# Patient Record
Sex: FEMALE | Race: OTHER | HISPANIC OR LATINO | URBAN - METROPOLITAN AREA
[De-identification: names, ages, dates, MRNs, and addresses within clinical notes are randomized per-mention and may not be internally consistent; named-entity substitution may affect disease eponyms.]

---

## 2017-08-23 ENCOUNTER — EMERGENCY (EMERGENCY)
Facility: HOSPITAL | Age: 67
LOS: 1 days | Discharge: HOME | End: 2017-08-23
Attending: EMERGENCY MEDICINE
Payer: COMMERCIAL

## 2017-08-23 VITALS
SYSTOLIC BLOOD PRESSURE: 137 MMHG | TEMPERATURE: 98 F | OXYGEN SATURATION: 93 % | HEART RATE: 99 BPM | DIASTOLIC BLOOD PRESSURE: 84 MMHG | RESPIRATION RATE: 20 BRPM

## 2017-08-23 VITALS
RESPIRATION RATE: 18 BRPM | HEART RATE: 110 BPM | WEIGHT: 139.99 LBS | TEMPERATURE: 98 F | DIASTOLIC BLOOD PRESSURE: 77 MMHG | SYSTOLIC BLOOD PRESSURE: 178 MMHG | OXYGEN SATURATION: 95 % | HEIGHT: 55 IN

## 2017-08-23 LAB
ALBUMIN SERPL ELPH-MCNC: 4.1 G/DL — SIGNIFICANT CHANGE UP (ref 3.3–5.2)
ALP SERPL-CCNC: 96 U/L — SIGNIFICANT CHANGE UP (ref 40–120)
ALT FLD-CCNC: 27 U/L — SIGNIFICANT CHANGE UP
ANION GAP SERPL CALC-SCNC: 16 MMOL/L — SIGNIFICANT CHANGE UP (ref 5–17)
ANISOCYTOSIS BLD QL: SLIGHT — SIGNIFICANT CHANGE UP
AST SERPL-CCNC: 37 U/L — HIGH
BASOPHILS # BLD AUTO: 0 K/UL — SIGNIFICANT CHANGE UP (ref 0–0.2)
BASOPHILS NFR BLD AUTO: 0.2 % — SIGNIFICANT CHANGE UP (ref 0–2)
BILIRUB SERPL-MCNC: 0.2 MG/DL — LOW (ref 0.4–2)
BUN SERPL-MCNC: 28 MG/DL — HIGH (ref 8–20)
CALCIUM SERPL-MCNC: 10.1 MG/DL — SIGNIFICANT CHANGE UP (ref 8.6–10.2)
CHLORIDE SERPL-SCNC: 103 MMOL/L — SIGNIFICANT CHANGE UP (ref 98–107)
CO2 SERPL-SCNC: 23 MMOL/L — SIGNIFICANT CHANGE UP (ref 22–29)
CREAT SERPL-MCNC: 0.87 MG/DL — SIGNIFICANT CHANGE UP (ref 0.5–1.3)
ELLIPTOCYTES BLD QL SMEAR: SLIGHT — SIGNIFICANT CHANGE UP
EOSINOPHIL # BLD AUTO: 0.1 K/UL — SIGNIFICANT CHANGE UP (ref 0–0.5)
EOSINOPHIL NFR BLD AUTO: 1.2 % — SIGNIFICANT CHANGE UP (ref 0–6)
GLUCOSE SERPL-MCNC: 83 MG/DL — SIGNIFICANT CHANGE UP (ref 70–115)
HCT VFR BLD CALC: 36.6 % — LOW (ref 37–47)
HGB BLD-MCNC: 12.1 G/DL — SIGNIFICANT CHANGE UP (ref 12–16)
LIDOCAIN IGE QN: 61 U/L — HIGH (ref 22–51)
LYMPHOCYTES # BLD AUTO: 1.8 K/UL — SIGNIFICANT CHANGE UP (ref 1–4.8)
LYMPHOCYTES # BLD AUTO: 22.3 % — SIGNIFICANT CHANGE UP (ref 20–55)
MACROCYTES BLD QL: SLIGHT — SIGNIFICANT CHANGE UP
MCHC RBC-ENTMCNC: 26 PG — LOW (ref 27–31)
MCHC RBC-ENTMCNC: 33.1 G/DL — SIGNIFICANT CHANGE UP (ref 32–36)
MCV RBC AUTO: 78.5 FL — LOW (ref 81–99)
MICROCYTES BLD QL: SLIGHT — SIGNIFICANT CHANGE UP
MONOCYTES # BLD AUTO: 0.6 K/UL — SIGNIFICANT CHANGE UP (ref 0–0.8)
MONOCYTES NFR BLD AUTO: 6.6 % — SIGNIFICANT CHANGE UP (ref 3–10)
NEUTROPHILS # BLD AUTO: 5.7 K/UL — SIGNIFICANT CHANGE UP (ref 1.8–8)
NEUTROPHILS NFR BLD AUTO: 69.5 % — SIGNIFICANT CHANGE UP (ref 37–73)
OVALOCYTES BLD QL SMEAR: SLIGHT — SIGNIFICANT CHANGE UP
PLAT MORPH BLD: NORMAL — SIGNIFICANT CHANGE UP
PLATELET # BLD AUTO: 196 K/UL — SIGNIFICANT CHANGE UP (ref 150–400)
POIKILOCYTOSIS BLD QL AUTO: SLIGHT — SIGNIFICANT CHANGE UP
POTASSIUM SERPL-MCNC: 5.5 MMOL/L — HIGH (ref 3.5–5.3)
POTASSIUM SERPL-SCNC: 5.5 MMOL/L — HIGH (ref 3.5–5.3)
PROT SERPL-MCNC: 7.6 G/DL — SIGNIFICANT CHANGE UP (ref 6.6–8.7)
RBC # BLD: 4.66 M/UL — SIGNIFICANT CHANGE UP (ref 4.4–5.2)
RBC # FLD: 20.5 % — HIGH (ref 11–15.6)
RBC BLD AUTO: ABNORMAL
SODIUM SERPL-SCNC: 142 MMOL/L — SIGNIFICANT CHANGE UP (ref 135–145)
WBC # BLD: 8.3 K/UL — SIGNIFICANT CHANGE UP (ref 4.8–10.8)
WBC # FLD AUTO: 8.3 K/UL — SIGNIFICANT CHANGE UP (ref 4.8–10.8)

## 2017-08-23 PROCEDURE — 99285 EMERGENCY DEPT VISIT HI MDM: CPT

## 2017-08-23 PROCEDURE — 74177 CT ABD & PELVIS W/CONTRAST: CPT | Mod: 26

## 2017-08-23 PROCEDURE — 93010 ELECTROCARDIOGRAM REPORT: CPT

## 2017-08-23 RX ORDER — SODIUM CHLORIDE 9 MG/ML
1000 INJECTION INTRAMUSCULAR; INTRAVENOUS; SUBCUTANEOUS ONCE
Qty: 0 | Refills: 0 | Status: COMPLETED | OUTPATIENT
Start: 2017-08-23 | End: 2017-08-23

## 2017-08-23 RX ORDER — ONDANSETRON 8 MG/1
4 TABLET, FILM COATED ORAL ONCE
Qty: 0 | Refills: 0 | Status: COMPLETED | OUTPATIENT
Start: 2017-08-23 | End: 2017-08-23

## 2017-08-23 RX ORDER — FAMOTIDINE 10 MG/ML
20 INJECTION INTRAVENOUS ONCE
Qty: 0 | Refills: 0 | Status: COMPLETED | OUTPATIENT
Start: 2017-08-23 | End: 2017-08-23

## 2017-08-23 RX ORDER — MORPHINE SULFATE 50 MG/1
4 CAPSULE, EXTENDED RELEASE ORAL ONCE
Qty: 0 | Refills: 0 | Status: DISCONTINUED | OUTPATIENT
Start: 2017-08-23 | End: 2017-08-23

## 2017-08-23 RX ORDER — PANTOPRAZOLE SODIUM 20 MG/1
40 TABLET, DELAYED RELEASE ORAL ONCE
Qty: 0 | Refills: 0 | Status: COMPLETED | OUTPATIENT
Start: 2017-08-23 | End: 2017-08-23

## 2017-08-23 RX ORDER — SODIUM CHLORIDE 9 MG/ML
3 INJECTION INTRAMUSCULAR; INTRAVENOUS; SUBCUTANEOUS ONCE
Qty: 0 | Refills: 0 | Status: COMPLETED | OUTPATIENT
Start: 2017-08-23 | End: 2017-08-23

## 2017-08-23 RX ORDER — METRONIDAZOLE 500 MG
500 TABLET ORAL ONCE
Qty: 0 | Refills: 0 | Status: COMPLETED | OUTPATIENT
Start: 2017-08-23 | End: 2017-08-23

## 2017-08-23 RX ORDER — SODIUM CHLORIDE 9 MG/ML
1000 INJECTION INTRAMUSCULAR; INTRAVENOUS; SUBCUTANEOUS
Qty: 0 | Refills: 0 | Status: DISCONTINUED | OUTPATIENT
Start: 2017-08-23 | End: 2017-08-27

## 2017-08-23 RX ADMIN — ONDANSETRON 4 MILLIGRAM(S): 8 TABLET, FILM COATED ORAL at 19:02

## 2017-08-23 RX ADMIN — SODIUM CHLORIDE 1000 MILLILITER(S): 9 INJECTION INTRAMUSCULAR; INTRAVENOUS; SUBCUTANEOUS at 21:31

## 2017-08-23 RX ADMIN — PANTOPRAZOLE SODIUM 40 MILLIGRAM(S): 20 TABLET, DELAYED RELEASE ORAL at 19:02

## 2017-08-23 RX ADMIN — MORPHINE SULFATE 4 MILLIGRAM(S): 50 CAPSULE, EXTENDED RELEASE ORAL at 17:12

## 2017-08-23 RX ADMIN — MORPHINE SULFATE 4 MILLIGRAM(S): 50 CAPSULE, EXTENDED RELEASE ORAL at 18:19

## 2017-08-23 RX ADMIN — FAMOTIDINE 20 MILLIGRAM(S): 10 INJECTION INTRAVENOUS at 17:15

## 2017-08-23 RX ADMIN — SODIUM CHLORIDE 3 MILLILITER(S): 9 INJECTION INTRAMUSCULAR; INTRAVENOUS; SUBCUTANEOUS at 17:00

## 2017-08-23 RX ADMIN — SODIUM CHLORIDE 125 MILLILITER(S): 9 INJECTION INTRAMUSCULAR; INTRAVENOUS; SUBCUTANEOUS at 17:00

## 2017-08-23 RX ADMIN — ONDANSETRON 4 MILLIGRAM(S): 8 TABLET, FILM COATED ORAL at 17:10

## 2017-08-23 NOTE — ED ADULT NURSE REASSESSMENT NOTE - NS ED NURSE REASSESS COMMENT FT1
Assumed care of Pt. Pt is Aox3 in NAD. Pt denies complaint.  IV clean dry and intact, running without difficulty. Pt OOB independently. Safety maintained, Bed locked in lowest position. Call bell in reach.

## 2017-08-23 NOTE — ED ADULT NURSE NOTE - OBJECTIVE STATEMENT
pt presents to ED with epigastric pain with n/v and intermittent diarrhea x five days. afebrile. denies urinary complaints. breathing si even and unlabored a&ox3 will continue to monitor and reassess.

## 2017-08-23 NOTE — ED ADULT NURSE NOTE - NS ED NURSE DC INFO COMPLEXITY
Returned Demonstration/Verbalized Understanding/Straightforward: Basic instructions, no meds, no home treatment/Patient asked questions

## 2017-08-23 NOTE — ED ADULT NURSE NOTE - PMH
Anemia    Diabetes mellitus    Dyslipidemia    GERD (gastroesophageal reflux disease)    Hypertension    Myocardial infarction    Sleep apnea

## 2017-08-23 NOTE — ED ADULT NURSE NOTE - PSH
H/O right knee surgery  8/20  S/P cataract extraction  B/L  S/P cholecystectomy    S/P partial thyroidectomy    S/P tubal ligation  s/p cyst removal of left axilla

## 2017-08-24 LAB
APPEARANCE UR: CLEAR — SIGNIFICANT CHANGE UP
BILIRUB UR-MCNC: NEGATIVE — SIGNIFICANT CHANGE UP
COLOR SPEC: YELLOW — SIGNIFICANT CHANGE UP
DIFF PNL FLD: NEGATIVE — SIGNIFICANT CHANGE UP
EPI CELLS # UR: SIGNIFICANT CHANGE UP
GLUCOSE UR QL: NEGATIVE MG/DL — SIGNIFICANT CHANGE UP
KETONES UR-MCNC: ABNORMAL
LEUKOCYTE ESTERASE UR-ACNC: ABNORMAL
NITRITE UR-MCNC: NEGATIVE — SIGNIFICANT CHANGE UP
PH UR: 5 — SIGNIFICANT CHANGE UP (ref 5–8)
PROT UR-MCNC: 15 MG/DL
RBC CASTS # UR COMP ASSIST: NEGATIVE /HPF — SIGNIFICANT CHANGE UP (ref 0–4)
SP GR SPEC: 1.02 — SIGNIFICANT CHANGE UP (ref 1.01–1.02)
UROBILINOGEN FLD QL: NEGATIVE MG/DL — SIGNIFICANT CHANGE UP
WBC UR QL: SIGNIFICANT CHANGE UP

## 2017-08-24 PROCEDURE — 96376 TX/PRO/DX INJ SAME DRUG ADON: CPT

## 2017-08-24 PROCEDURE — 87186 SC STD MICRODIL/AGAR DIL: CPT

## 2017-08-24 PROCEDURE — 96374 THER/PROPH/DIAG INJ IV PUSH: CPT | Mod: XU

## 2017-08-24 PROCEDURE — 93005 ELECTROCARDIOGRAM TRACING: CPT

## 2017-08-24 PROCEDURE — 85027 COMPLETE CBC AUTOMATED: CPT

## 2017-08-24 PROCEDURE — 87086 URINE CULTURE/COLONY COUNT: CPT

## 2017-08-24 PROCEDURE — 81001 URINALYSIS AUTO W/SCOPE: CPT

## 2017-08-24 PROCEDURE — 36415 COLL VENOUS BLD VENIPUNCTURE: CPT

## 2017-08-24 PROCEDURE — 74177 CT ABD & PELVIS W/CONTRAST: CPT

## 2017-08-24 PROCEDURE — 96375 TX/PRO/DX INJ NEW DRUG ADDON: CPT

## 2017-08-24 PROCEDURE — 83690 ASSAY OF LIPASE: CPT

## 2017-08-24 PROCEDURE — 80053 COMPREHEN METABOLIC PANEL: CPT

## 2017-08-24 PROCEDURE — 99284 EMERGENCY DEPT VISIT MOD MDM: CPT | Mod: 25

## 2017-08-24 PROCEDURE — T1013: CPT

## 2017-08-24 RX ORDER — METRONIDAZOLE 500 MG
1 TABLET ORAL
Qty: 30 | Refills: 0 | OUTPATIENT
Start: 2017-08-24 | End: 2017-09-03

## 2017-08-24 RX ORDER — CIPROFLOXACIN LACTATE 400MG/40ML
1 VIAL (ML) INTRAVENOUS
Qty: 10 | Refills: 0 | OUTPATIENT
Start: 2017-08-24 | End: 2017-09-03

## 2017-08-24 RX ADMIN — SODIUM CHLORIDE 1000 MILLILITER(S): 9 INJECTION INTRAMUSCULAR; INTRAVENOUS; SUBCUTANEOUS at 00:21

## 2017-08-24 RX ADMIN — Medication 100 MILLIGRAM(S): at 00:21

## 2017-08-28 ENCOUNTER — EMERGENCY (EMERGENCY)
Facility: HOSPITAL | Age: 67
LOS: 1 days | Discharge: HOME | End: 2017-08-28
Attending: EMERGENCY MEDICINE
Payer: COMMERCIAL

## 2017-08-28 VITALS
DIASTOLIC BLOOD PRESSURE: 71 MMHG | HEART RATE: 101 BPM | HEIGHT: 55 IN | SYSTOLIC BLOOD PRESSURE: 107 MMHG | OXYGEN SATURATION: 95 % | RESPIRATION RATE: 20 BRPM | TEMPERATURE: 98 F | WEIGHT: 134.92 LBS

## 2017-08-28 LAB
ALBUMIN SERPL ELPH-MCNC: 4.1 G/DL — SIGNIFICANT CHANGE UP (ref 3.3–5.2)
ALP SERPL-CCNC: 90 U/L — SIGNIFICANT CHANGE UP (ref 40–120)
ALT FLD-CCNC: 38 U/L — HIGH
ANION GAP SERPL CALC-SCNC: 16 MMOL/L — SIGNIFICANT CHANGE UP (ref 5–17)
APTT BLD: 31.6 SEC — SIGNIFICANT CHANGE UP (ref 27.5–37.4)
AST SERPL-CCNC: 89 U/L — HIGH
BASOPHILS # BLD AUTO: 0 K/UL — SIGNIFICANT CHANGE UP (ref 0–0.2)
BASOPHILS NFR BLD AUTO: 0.2 % — SIGNIFICANT CHANGE UP (ref 0–2)
BILIRUB SERPL-MCNC: 0.2 MG/DL — LOW (ref 0.4–2)
BUN SERPL-MCNC: 17 MG/DL — SIGNIFICANT CHANGE UP (ref 8–20)
CALCIUM SERPL-MCNC: 9.5 MG/DL — SIGNIFICANT CHANGE UP (ref 8.6–10.2)
CHLORIDE SERPL-SCNC: 102 MMOL/L — SIGNIFICANT CHANGE UP (ref 98–107)
CK SERPL-CCNC: 77 U/L — SIGNIFICANT CHANGE UP (ref 25–170)
CO2 SERPL-SCNC: 21 MMOL/L — LOW (ref 22–29)
CREAT SERPL-MCNC: 0.8 MG/DL — SIGNIFICANT CHANGE UP (ref 0.5–1.3)
EOSINOPHIL # BLD AUTO: 0.4 K/UL — SIGNIFICANT CHANGE UP (ref 0–0.5)
EOSINOPHIL NFR BLD AUTO: 7.2 % — HIGH (ref 0–6)
GLUCOSE SERPL-MCNC: 61 MG/DL — LOW (ref 70–115)
HCT VFR BLD CALC: 37.1 % — SIGNIFICANT CHANGE UP (ref 37–47)
HGB BLD-MCNC: 12.4 G/DL — SIGNIFICANT CHANGE UP (ref 12–16)
INR BLD: 1.09 RATIO — SIGNIFICANT CHANGE UP (ref 0.88–1.16)
LYMPHOCYTES # BLD AUTO: 1.4 K/UL — SIGNIFICANT CHANGE UP (ref 1–4.8)
LYMPHOCYTES # BLD AUTO: 25.9 % — SIGNIFICANT CHANGE UP (ref 20–55)
MCHC RBC-ENTMCNC: 25.9 PG — LOW (ref 27–31)
MCHC RBC-ENTMCNC: 33.4 G/DL — SIGNIFICANT CHANGE UP (ref 32–36)
MCV RBC AUTO: 77.6 FL — LOW (ref 81–99)
MONOCYTES # BLD AUTO: 0.4 K/UL — SIGNIFICANT CHANGE UP (ref 0–0.8)
MONOCYTES NFR BLD AUTO: 6.3 % — SIGNIFICANT CHANGE UP (ref 3–10)
NEUTROPHILS # BLD AUTO: 3.3 K/UL — SIGNIFICANT CHANGE UP (ref 1.8–8)
NEUTROPHILS NFR BLD AUTO: 60.2 % — SIGNIFICANT CHANGE UP (ref 37–73)
NT-PROBNP SERPL-SCNC: 30 PG/ML — SIGNIFICANT CHANGE UP (ref 0–300)
PLATELET # BLD AUTO: 167 K/UL — SIGNIFICANT CHANGE UP (ref 150–400)
POTASSIUM SERPL-MCNC: 5.1 MMOL/L — SIGNIFICANT CHANGE UP (ref 3.5–5.3)
POTASSIUM SERPL-SCNC: 5.1 MMOL/L — SIGNIFICANT CHANGE UP (ref 3.5–5.3)
PROT SERPL-MCNC: 7.7 G/DL — SIGNIFICANT CHANGE UP (ref 6.6–8.7)
PROTHROM AB SERPL-ACNC: 12 SEC — SIGNIFICANT CHANGE UP (ref 9.8–12.7)
RBC # BLD: 4.78 M/UL — SIGNIFICANT CHANGE UP (ref 4.4–5.2)
RBC # FLD: 19.7 % — HIGH (ref 11–15.6)
SODIUM SERPL-SCNC: 139 MMOL/L — SIGNIFICANT CHANGE UP (ref 135–145)
TROPONIN T SERPL-MCNC: <0.01 NG/ML — SIGNIFICANT CHANGE UP (ref 0–0.06)
WBC # BLD: 5.6 K/UL — SIGNIFICANT CHANGE UP (ref 4.8–10.8)
WBC # FLD AUTO: 5.6 K/UL — SIGNIFICANT CHANGE UP (ref 4.8–10.8)

## 2017-08-28 PROCEDURE — 85730 THROMBOPLASTIN TIME PARTIAL: CPT

## 2017-08-28 PROCEDURE — 80053 COMPREHEN METABOLIC PANEL: CPT

## 2017-08-28 PROCEDURE — 84484 ASSAY OF TROPONIN QUANT: CPT

## 2017-08-28 PROCEDURE — 71275 CT ANGIOGRAPHY CHEST: CPT

## 2017-08-28 PROCEDURE — 93005 ELECTROCARDIOGRAM TRACING: CPT

## 2017-08-28 PROCEDURE — 85610 PROTHROMBIN TIME: CPT

## 2017-08-28 PROCEDURE — 96374 THER/PROPH/DIAG INJ IV PUSH: CPT | Mod: XU

## 2017-08-28 PROCEDURE — 82550 ASSAY OF CK (CPK): CPT

## 2017-08-28 PROCEDURE — 83880 ASSAY OF NATRIURETIC PEPTIDE: CPT

## 2017-08-28 PROCEDURE — 71020: CPT | Mod: 26

## 2017-08-28 PROCEDURE — 36415 COLL VENOUS BLD VENIPUNCTURE: CPT

## 2017-08-28 PROCEDURE — 99284 EMERGENCY DEPT VISIT MOD MDM: CPT | Mod: 25

## 2017-08-28 PROCEDURE — T1013: CPT

## 2017-08-28 PROCEDURE — 71275 CT ANGIOGRAPHY CHEST: CPT | Mod: 26

## 2017-08-28 PROCEDURE — 99284 EMERGENCY DEPT VISIT MOD MDM: CPT

## 2017-08-28 PROCEDURE — 93010 ELECTROCARDIOGRAM REPORT: CPT

## 2017-08-28 PROCEDURE — 71046 X-RAY EXAM CHEST 2 VIEWS: CPT

## 2017-08-28 PROCEDURE — 94640 AIRWAY INHALATION TREATMENT: CPT

## 2017-08-28 PROCEDURE — 85027 COMPLETE CBC AUTOMATED: CPT

## 2017-08-28 RX ORDER — ALBUTEROL 90 UG/1
2.5 AEROSOL, METERED ORAL ONCE
Qty: 0 | Refills: 0 | Status: COMPLETED | OUTPATIENT
Start: 2017-08-28 | End: 2017-08-28

## 2017-08-28 RX ORDER — IPRATROPIUM/ALBUTEROL SULFATE 18-103MCG
3 AEROSOL WITH ADAPTER (GRAM) INHALATION ONCE
Qty: 0 | Refills: 0 | Status: COMPLETED | OUTPATIENT
Start: 2017-08-28 | End: 2017-08-28

## 2017-08-28 RX ORDER — ALBUTEROL 90 UG/1
2 AEROSOL, METERED ORAL
Qty: 360 | Refills: 0 | OUTPATIENT
Start: 2017-08-28 | End: 2017-09-27

## 2017-08-28 RX ADMIN — Medication 3 MILLILITER(S): at 18:42

## 2017-08-28 RX ADMIN — ALBUTEROL 2.5 MILLIGRAM(S): 90 AEROSOL, METERED ORAL at 17:28

## 2017-08-28 RX ADMIN — Medication 125 MILLIGRAM(S): at 18:42

## 2017-08-28 NOTE — ED ADULT TRIAGE NOTE - CHIEF COMPLAINT QUOTE
"I have a cough for 1 month, but it worsened over the last 5 days, I saw the dr today and he sent me here. " Pt sent in from [facility] office for medical evaluation for SOB, FRY and wheezing. Pt has audible wheezing with cough.

## 2017-08-28 NOTE — ED ADULT NURSE NOTE - OBJECTIVE STATEMENT
LAte entry : Pt presented to ED send by PMD for eval of SOB upon exertion  for one month , denies chest pain

## 2017-08-28 NOTE — ED STATDOCS - PROGRESS NOTE DETAILS
68 y/o F pt with a PMHx of anemia, DM, dyslipidemia, HTN, MI, GERD  presents to the ED c/o SOB, exertional dyspnea, orthopnea, blood in cough, chest pain, itch  that onset 5 days ago. She states that she went to her PMD today who sent her into the ED for the issue. Came to the ED 6 days ago. Denies pedal edema, recent travel or any other complaints. NKDA.  Focused eval, protocol orders entered. Pt to be moved to main ED for complete evaluation by another provider.

## 2017-08-28 NOTE — ED PROVIDER NOTE - OBJECTIVE STATEMENT
This patient is a 67 year old woman with a hx of HTN, HLD, and DM sent by her PMD for evaluation of SOB on exertion.  Patient is currently being treated for colitis and states that she has been taking antibiotics daily.  She c/o cough x 1 month and most recently SOB on exertion x 5 days.  She was seen by her PMD Dr. Ryder today and was sent to the ER.  She denies fever and chest pain.  Patient denies hx of lung disease.

## 2017-08-28 NOTE — ED PROVIDER NOTE - PROGRESS NOTE DETAILS
ambulatory with steady gait.  as per sign out, pt will be discharged with albuterol, steroids, and pulmonary follow up

## 2017-08-28 NOTE — ED ADULT NURSE REASSESSMENT NOTE - NS ED NURSE REASSESS COMMENT FT1
Pt. received at 1930, A&Ox3, denies pain or discomfort. found to be mildly tachycardic, recently finished breathing treatment, no chest pain noted. awaiting transport to CT. will continue to monitor and maintain safety

## 2017-08-28 NOTE — ED PROVIDER NOTE - SHIFT CHANGE DETAILS
F/U CT.  If no acute pathology discharge patient with albuterol and prednisone to treat for bronchitis.

## 2017-08-28 NOTE — ED PROVIDER NOTE - MEDICAL DECISION MAKING DETAILS
SOB on exertion no known lung disease.  Will check EKG, Labs, CXR, administer albuterol and Re-evaluate.

## 2017-08-29 VITALS
HEART RATE: 99 BPM | TEMPERATURE: 98 F | DIASTOLIC BLOOD PRESSURE: 61 MMHG | OXYGEN SATURATION: 99 % | RESPIRATION RATE: 18 BRPM | SYSTOLIC BLOOD PRESSURE: 115 MMHG

## 2017-10-27 ENCOUNTER — APPOINTMENT (OUTPATIENT)
Dept: PULMONOLOGY | Facility: CLINIC | Age: 67
End: 2017-10-27
Payer: MEDICARE

## 2017-10-27 VITALS
DIASTOLIC BLOOD PRESSURE: 78 MMHG | HEIGHT: 55 IN | BODY MASS INDEX: 30.78 KG/M2 | SYSTOLIC BLOOD PRESSURE: 130 MMHG | HEART RATE: 93 BPM | WEIGHT: 133 LBS | OXYGEN SATURATION: 96 %

## 2017-10-27 VITALS — RESPIRATION RATE: 16 BRPM

## 2017-10-27 DIAGNOSIS — R05 COUGH: ICD-10-CM

## 2017-10-27 DIAGNOSIS — K21.9 GASTRO-ESOPHAGEAL REFLUX DISEASE W/OUT ESOPHAGITIS: ICD-10-CM

## 2017-10-27 DIAGNOSIS — Z00.00 ENCOUNTER FOR GENERAL ADULT MEDICAL EXAMINATION W/OUT ABNORMAL FINDINGS: ICD-10-CM

## 2017-10-27 DIAGNOSIS — R06.02 SHORTNESS OF BREATH: ICD-10-CM

## 2017-10-27 DIAGNOSIS — J45.40 MODERATE PERSISTENT ASTHMA, UNCOMPLICATED: ICD-10-CM

## 2017-10-27 PROCEDURE — 94664 DEMO&/EVAL PT USE INHALER: CPT | Mod: 59

## 2017-10-27 PROCEDURE — 94727 GAS DIL/WSHOT DETER LNG VOL: CPT

## 2017-10-27 PROCEDURE — 85018 HEMOGLOBIN: CPT | Mod: QW

## 2017-10-27 PROCEDURE — 94060 EVALUATION OF WHEEZING: CPT

## 2017-10-27 PROCEDURE — 99204 OFFICE O/P NEW MOD 45 MIN: CPT | Mod: 25

## 2017-10-27 PROCEDURE — 94729 DIFFUSING CAPACITY: CPT

## 2017-10-27 RX ORDER — MONTELUKAST 10 MG/1
10 TABLET, FILM COATED ORAL
Qty: 30 | Refills: 0 | Status: ACTIVE | COMMUNITY
Start: 2017-10-07

## 2017-10-27 RX ORDER — MELOXICAM 15 MG/1
15 TABLET ORAL
Qty: 30 | Refills: 0 | Status: ACTIVE | COMMUNITY
Start: 2017-09-22

## 2017-10-27 RX ORDER — BACLOFEN 20 MG/1
20 TABLET ORAL
Qty: 30 | Refills: 0 | Status: ACTIVE | COMMUNITY
Start: 2017-05-08

## 2017-10-27 RX ORDER — ALBUTEROL SULFATE 2.5 MG/3ML
(2.5 MG/3ML) SOLUTION RESPIRATORY (INHALATION)
Qty: 75 | Refills: 0 | Status: ACTIVE | COMMUNITY
Start: 2017-10-09

## 2017-10-27 RX ORDER — BLOOD-GLUCOSE METER
31G X 5 MM EACH MISCELLANEOUS
Qty: 100 | Refills: 0 | Status: ACTIVE | COMMUNITY
Start: 2017-08-02

## 2017-10-27 RX ORDER — DAPAGLIFLOZIN 5 MG/1
5 TABLET, FILM COATED ORAL
Qty: 90 | Refills: 0 | Status: DISCONTINUED | COMMUNITY
Start: 2017-08-02 | End: 2017-10-27

## 2017-10-27 RX ORDER — PREDNISONE 20 MG/1
20 TABLET ORAL
Qty: 8 | Refills: 0 | Status: DISCONTINUED | COMMUNITY
Start: 2017-08-29 | End: 2017-10-27

## 2017-10-27 RX ORDER — BLOOD SUGAR DIAGNOSTIC
STRIP MISCELLANEOUS
Qty: 300 | Refills: 0 | Status: ACTIVE | COMMUNITY
Start: 2017-08-02

## 2017-10-27 RX ORDER — NITROGLYCERIN 0.4 MG/1
0.4 TABLET SUBLINGUAL
Qty: 25 | Refills: 0 | Status: ACTIVE | COMMUNITY
Start: 2017-10-06

## 2017-10-27 RX ORDER — OMEPRAZOLE 20 MG/1
20 CAPSULE, DELAYED RELEASE ORAL
Qty: 60 | Refills: 0 | Status: ACTIVE | COMMUNITY
Start: 2017-06-19

## 2017-10-27 RX ORDER — LINAGLIPTIN 5 MG/1
5 TABLET, FILM COATED ORAL
Qty: 90 | Refills: 0 | Status: ACTIVE | COMMUNITY
Start: 2017-06-05

## 2017-10-27 RX ORDER — REPAGLINIDE 2 MG/1
2 TABLET ORAL
Qty: 270 | Refills: 0 | Status: DISCONTINUED | COMMUNITY
Start: 2017-05-08 | End: 2017-10-27

## 2017-10-27 RX ORDER — METFORMIN ER 500 MG 500 MG/1
500 TABLET ORAL
Qty: 360 | Refills: 0 | Status: ACTIVE | COMMUNITY
Start: 2017-06-28

## 2017-10-27 RX ORDER — TRAMADOL HYDROCHLORIDE 50 MG/1
50 TABLET, COATED ORAL
Qty: 60 | Refills: 0 | Status: ACTIVE | COMMUNITY
Start: 2017-07-19

## 2017-10-27 RX ORDER — LISINOPRIL AND HYDROCHLOROTHIAZIDE TABLETS 20; 12.5 MG/1; MG/1
20-12.5 TABLET ORAL
Qty: 90 | Refills: 0 | Status: ACTIVE | COMMUNITY
Start: 2017-06-19

## 2017-10-27 RX ORDER — LEVOFLOXACIN 500 MG/1
500 TABLET, FILM COATED ORAL
Qty: 10 | Refills: 0 | Status: DISCONTINUED | COMMUNITY
Start: 2017-08-24 | End: 2017-10-27

## 2017-10-27 RX ORDER — ALBUTEROL SULFATE 90 UG/1
108 (90 BASE) AEROSOL, METERED RESPIRATORY (INHALATION)
Qty: 18 | Refills: 0 | Status: ACTIVE | COMMUNITY
Start: 2017-08-29

## 2017-10-27 RX ORDER — INSULIN GLARGINE 300 U/ML
300 INJECTION, SOLUTION SUBCUTANEOUS
Qty: 4 | Refills: 0 | Status: DISCONTINUED | COMMUNITY
Start: 2017-06-28 | End: 2017-10-27

## 2017-10-27 RX ORDER — LIRAGLUTIDE 6 MG/ML
18 INJECTION SUBCUTANEOUS
Qty: 18 | Refills: 0 | Status: ACTIVE | COMMUNITY
Start: 2017-08-02

## 2017-10-27 RX ORDER — LOSARTAN POTASSIUM AND HYDROCHLOROTHIAZIDE 12.5; 1 MG/1; MG/1
100-12.5 TABLET ORAL
Qty: 30 | Refills: 0 | Status: ACTIVE | COMMUNITY
Start: 2017-10-09

## 2017-10-27 RX ORDER — METHYLPREDNISOLONE 4 MG/1
4 TABLET ORAL
Qty: 21 | Refills: 0 | Status: DISCONTINUED | COMMUNITY
Start: 2017-10-07 | End: 2017-10-27

## 2017-10-27 RX ORDER — METRONIDAZOLE 500 MG/1
500 TABLET ORAL
Qty: 30 | Refills: 0 | Status: ACTIVE | COMMUNITY
Start: 2017-08-24

## 2017-10-27 RX ORDER — BACLOFEN 10 MG/1
10 TABLET ORAL
Qty: 30 | Refills: 0 | Status: DISCONTINUED | COMMUNITY
Start: 2017-08-16 | End: 2017-10-27

## 2017-10-27 RX ORDER — FLUTICASONE FUROATE AND VILANTEROL TRIFENATATE 100; 25 UG/1; UG/1
100-25 POWDER RESPIRATORY (INHALATION) DAILY
Qty: 1 | Refills: 5 | Status: ACTIVE | COMMUNITY
Start: 2017-10-27 | End: 1900-01-01

## 2017-10-27 RX ORDER — PEAK FLOW METER
EACH MISCELLANEOUS
Qty: 1 | Refills: 0 | Status: ACTIVE | COMMUNITY
Start: 2017-10-09

## 2017-10-27 RX ORDER — NAPROXEN 500 MG/1
500 TABLET ORAL
Qty: 60 | Refills: 0 | Status: ACTIVE | COMMUNITY
Start: 2017-05-08

## 2017-11-04 ENCOUNTER — TRANSCRIPTION ENCOUNTER (OUTPATIENT)
Age: 67
End: 2017-11-04

## 2017-11-04 ENCOUNTER — INPATIENT (INPATIENT)
Facility: HOSPITAL | Age: 67
LOS: 0 days | Discharge: HOME | DRG: 637 | End: 2017-11-04
Attending: HOSPITALIST | Admitting: HOSPITALIST
Payer: COMMERCIAL

## 2017-11-04 VITALS
SYSTOLIC BLOOD PRESSURE: 118 MMHG | RESPIRATION RATE: 16 BRPM | DIASTOLIC BLOOD PRESSURE: 65 MMHG | HEART RATE: 98 BPM | OXYGEN SATURATION: 94 %

## 2017-11-04 VITALS
TEMPERATURE: 98 F | OXYGEN SATURATION: 100 % | DIASTOLIC BLOOD PRESSURE: 79 MMHG | RESPIRATION RATE: 16 BRPM | WEIGHT: 149.91 LBS | HEART RATE: 85 BPM | HEIGHT: 62 IN | SYSTOLIC BLOOD PRESSURE: 116 MMHG

## 2017-11-04 DIAGNOSIS — E16.2 HYPOGLYCEMIA, UNSPECIFIED: ICD-10-CM

## 2017-11-04 LAB
ALBUMIN SERPL ELPH-MCNC: 3.4 G/DL — SIGNIFICANT CHANGE UP (ref 3.3–5.2)
ALP SERPL-CCNC: 95 U/L — SIGNIFICANT CHANGE UP (ref 40–120)
ALT FLD-CCNC: 17 U/L — SIGNIFICANT CHANGE UP
ANION GAP SERPL CALC-SCNC: 13 MMOL/L — SIGNIFICANT CHANGE UP (ref 5–17)
APPEARANCE UR: CLEAR — SIGNIFICANT CHANGE UP
AST SERPL-CCNC: 27 U/L — SIGNIFICANT CHANGE UP
BASOPHILS # BLD AUTO: 0 K/UL — SIGNIFICANT CHANGE UP (ref 0–0.2)
BASOPHILS NFR BLD AUTO: 0.3 % — SIGNIFICANT CHANGE UP (ref 0–2)
BILIRUB SERPL-MCNC: 0.3 MG/DL — LOW (ref 0.4–2)
BILIRUB UR-MCNC: NEGATIVE — SIGNIFICANT CHANGE UP
BUN SERPL-MCNC: 15 MG/DL — SIGNIFICANT CHANGE UP (ref 8–20)
CALCIUM SERPL-MCNC: 8.5 MG/DL — LOW (ref 8.6–10.2)
CHLORIDE SERPL-SCNC: 97 MMOL/L — LOW (ref 98–107)
CO2 SERPL-SCNC: 23 MMOL/L — SIGNIFICANT CHANGE UP (ref 22–29)
COLOR SPEC: YELLOW — SIGNIFICANT CHANGE UP
CREAT SERPL-MCNC: 0.84 MG/DL — SIGNIFICANT CHANGE UP (ref 0.5–1.3)
DIFF PNL FLD: NEGATIVE — SIGNIFICANT CHANGE UP
EOSINOPHIL # BLD AUTO: 0.4 K/UL — SIGNIFICANT CHANGE UP (ref 0–0.5)
EOSINOPHIL NFR BLD AUTO: 5.8 % — SIGNIFICANT CHANGE UP (ref 0–6)
GLUCOSE SERPL-MCNC: 237 MG/DL — HIGH (ref 70–115)
GLUCOSE UR QL: NEGATIVE MG/DL — SIGNIFICANT CHANGE UP
HCT VFR BLD CALC: 33.7 % — LOW (ref 37–47)
HGB BLD-MCNC: 11 G/DL — LOW (ref 12–16)
KETONES UR-MCNC: NEGATIVE — SIGNIFICANT CHANGE UP
LEUKOCYTE ESTERASE UR-ACNC: NEGATIVE — SIGNIFICANT CHANGE UP
LYMPHOCYTES # BLD AUTO: 1.1 K/UL — SIGNIFICANT CHANGE UP (ref 1–4.8)
LYMPHOCYTES # BLD AUTO: 14.3 % — LOW (ref 20–55)
MCHC RBC-ENTMCNC: 26 PG — LOW (ref 27–31)
MCHC RBC-ENTMCNC: 32.6 G/DL — SIGNIFICANT CHANGE UP (ref 32–36)
MCV RBC AUTO: 79.7 FL — LOW (ref 81–99)
MONOCYTES # BLD AUTO: 0.6 K/UL — SIGNIFICANT CHANGE UP (ref 0–0.8)
MONOCYTES NFR BLD AUTO: 7.5 % — SIGNIFICANT CHANGE UP (ref 3–10)
NEUTROPHILS # BLD AUTO: 5.6 K/UL — SIGNIFICANT CHANGE UP (ref 1.8–8)
NEUTROPHILS NFR BLD AUTO: 71.7 % — SIGNIFICANT CHANGE UP (ref 37–73)
NITRITE UR-MCNC: NEGATIVE — SIGNIFICANT CHANGE UP
PH UR: 6 — SIGNIFICANT CHANGE UP (ref 5–8)
PLATELET # BLD AUTO: 181 K/UL — SIGNIFICANT CHANGE UP (ref 150–400)
POTASSIUM SERPL-MCNC: 4.4 MMOL/L — SIGNIFICANT CHANGE UP (ref 3.5–5.3)
POTASSIUM SERPL-SCNC: 4.4 MMOL/L — SIGNIFICANT CHANGE UP (ref 3.5–5.3)
PROT SERPL-MCNC: 6.6 G/DL — SIGNIFICANT CHANGE UP (ref 6.6–8.7)
PROT UR-MCNC: NEGATIVE MG/DL — SIGNIFICANT CHANGE UP
RBC # BLD: 4.23 M/UL — LOW (ref 4.4–5.2)
RBC # FLD: 15.1 % — SIGNIFICANT CHANGE UP (ref 11–15.6)
SODIUM SERPL-SCNC: 133 MMOL/L — LOW (ref 135–145)
SP GR SPEC: 1.01 — SIGNIFICANT CHANGE UP (ref 1.01–1.02)
UROBILINOGEN FLD QL: NEGATIVE MG/DL — SIGNIFICANT CHANGE UP
WBC # BLD: 7.8 K/UL — SIGNIFICANT CHANGE UP (ref 4.8–10.8)
WBC # FLD AUTO: 7.8 K/UL — SIGNIFICANT CHANGE UP (ref 4.8–10.8)
WBC UR QL: SIGNIFICANT CHANGE UP

## 2017-11-04 PROCEDURE — 99285 EMERGENCY DEPT VISIT HI MDM: CPT | Mod: 25

## 2017-11-04 PROCEDURE — 71046 X-RAY EXAM CHEST 2 VIEWS: CPT

## 2017-11-04 PROCEDURE — 81001 URINALYSIS AUTO W/SCOPE: CPT

## 2017-11-04 PROCEDURE — 87040 BLOOD CULTURE FOR BACTERIA: CPT

## 2017-11-04 PROCEDURE — 93010 ELECTROCARDIOGRAM REPORT: CPT

## 2017-11-04 PROCEDURE — 80053 COMPREHEN METABOLIC PANEL: CPT

## 2017-11-04 PROCEDURE — 82962 GLUCOSE BLOOD TEST: CPT

## 2017-11-04 PROCEDURE — T1013: CPT

## 2017-11-04 PROCEDURE — 93005 ELECTROCARDIOGRAM TRACING: CPT

## 2017-11-04 PROCEDURE — 94640 AIRWAY INHALATION TREATMENT: CPT

## 2017-11-04 PROCEDURE — 96374 THER/PROPH/DIAG INJ IV PUSH: CPT

## 2017-11-04 PROCEDURE — 36415 COLL VENOUS BLD VENIPUNCTURE: CPT

## 2017-11-04 PROCEDURE — 87086 URINE CULTURE/COLONY COUNT: CPT

## 2017-11-04 PROCEDURE — 85027 COMPLETE CBC AUTOMATED: CPT

## 2017-11-04 PROCEDURE — 71020: CPT | Mod: 26

## 2017-11-04 RX ORDER — AZITHROMYCIN 500 MG/1
1 TABLET, FILM COATED ORAL
Qty: 5 | Refills: 0 | OUTPATIENT
Start: 2017-11-04 | End: 2017-11-09

## 2017-11-04 RX ORDER — ALBUTEROL 90 UG/1
2.5 AEROSOL, METERED ORAL ONCE
Qty: 0 | Refills: 0 | Status: COMPLETED | OUTPATIENT
Start: 2017-11-04 | End: 2017-11-04

## 2017-11-04 RX ORDER — ONDANSETRON 8 MG/1
1 TABLET, FILM COATED ORAL
Qty: 9 | Refills: 0 | OUTPATIENT
Start: 2017-11-04 | End: 2017-11-07

## 2017-11-04 RX ORDER — BACLOFEN 100 %
0 POWDER (GRAM) MISCELLANEOUS
Qty: 0 | Refills: 0 | COMMUNITY

## 2017-11-04 RX ADMIN — ALBUTEROL 2.5 MILLIGRAM(S): 90 AEROSOL, METERED ORAL at 05:56

## 2017-11-04 RX ADMIN — Medication 125 MILLIGRAM(S): at 05:56

## 2017-11-04 NOTE — ED ADULT NURSE NOTE - DISCHARGE TEACHING
MD. Bobby at bedside educating pt. and family on proper care of diabetes and not to take her metformin until she sees her MD. MD. Bobby at bedside educating pt. and family on proper care of medication

## 2017-11-04 NOTE — DISCHARGE NOTE ADULT - CARE PLAN
Principal Discharge DX:	Hypoglycemia  Goal:	resolved, secondary to metfromin  Instructions for follow-up, activity and diet:	ADVISED patient to hold until monday  advised to reduce dose to 1 tab a day bid  advised to see primary care  Secondary Diagnosis:	Metabolic encephalopathy  Goal:	resolved  Instructions for follow-up, activity and diet:	secondary to hypoglycemia  Secondary Diagnosis:	Diabetes mellitus  Goal:	CHECK HEMOGLOBIN a1c  Secondary Diagnosis:	Dyslipidemia  Goal:	home meds  Secondary Diagnosis:	Hypertension  Goal:	home meds  Secondary Diagnosis:	Bronchitis  Goal:	z pack and robitusissin  Instructions for follow-up, activity and diet:	home inhalers  Secondary Diagnosis:	Asthma  Goal:	home inhalers

## 2017-11-04 NOTE — DISCHARGE NOTE ADULT - HOSPITAL COURSE
67 yof with pmh of dm2 on metformin and invokana presents from home with confusion, ams and found to have hypoglycemia in er. Patient given juice and sugars recovered. Patient states she last had invokana last week and was taking 2 tabs po bid of metformin even though she was not eating or drinking well. Patient denies any fever, chills, diarrhea. Patient complains of a cough for 1 year.   Patients work up negative with no white count, neg ua and neg chest xray,    patient told to hold metfromin for 2 days and to dec dose to 1 tab po bid. Patient advsied to increase oral intake.     time speent on dc 32 mins

## 2017-11-04 NOTE — DISCHARGE NOTE ADULT - MEDICATION SUMMARY - MEDICATIONS TO STOP TAKING
I will STOP taking the medications listed below when I get home from the hospital:    repaglinide 2 mg oral tablet  -- 1 tab(s) by mouth 3 times a day (before meals)    oxyCODONE 5 mg oral tablet  -- 1 tab(s) by mouth every 4-6 hours, As needed, Mild Pain    baclofen  --  by mouth    naproxen  --  by mouth    Levaquin 500 mg oral tablet  -- 1 tab(s) by mouth every 24 hours  -- Avoid prolonged or excessive exposure to direct and/or artificial sunlight while taking this medication.  Do not take dairy products, antacids, or iron preparations within one hour of this medication.  Finish all this medication unless otherwise directed by prescriber.  May cause drowsiness or dizziness.  Medication should be taken with plenty of water.    Flagyl 500 mg oral tablet  -- 1 tab(s) by mouth 3 times a day  -- Do not drink alcoholic beverages when taking this medication.  Finish all this medication unless otherwise directed by prescriber.  May discolor urine or feces.    predniSONE 20 mg oral tablet  -- 2 tab(s) by mouth once a day  -- It is very important that you take or use this exactly as directed.  Do not skip doses or discontinue unless directed by your doctor.  Obtain medical advice before taking any non-prescription drugs as some may affect the action of this medication.  Take with food or milk.

## 2017-11-04 NOTE — ED ADULT NURSE NOTE - OBJECTIVE STATEMENT
patient states that she has not been feeling well fever chills nausea when eating and not taking her diabetic medication for 1 week, states that she has not taken it in weeks, but became hypoglycemic today,  last meal was a piece of bread yesterday and possible a little oatmeal, patient also SOB, glucose level 41, patient states usually does not go below 60, "didn't feel right" family states AMS, but at this time back to base line

## 2017-11-04 NOTE — ED ADULT TRIAGE NOTE - CHIEF COMPLAINT QUOTE
patient arrived via ambulance - with non rebreather in use - as per ems - accu check was 41 on scene - no als

## 2017-11-04 NOTE — ED PROVIDER NOTE - OBJECTIVE STATEMENT
68 yo female with h/o DM, on victoza and metformin, (last dose of victoza was a week ago) was at a friends home when she was found to be minimally responsive. FSBS was low.   upon arrival in ER, fsbs 40's.   she improved with oral and parenteral glucose.   denies any recent fevers, chills, urine complaints.   + mild nausea, cough.   has not been eating as much as usual b/c nausea.   no previous episode of hypoglycemia.   no change in doses.

## 2017-11-04 NOTE — ED ADULT NURSE REASSESSMENT NOTE - NS ED NURSE REASSESS COMMENT FT1
pt. received from night RN. pt. denies pain or discomfort. pt. in no apparent distress. vss. as documented. will continue to monitor.

## 2017-11-04 NOTE — DISCHARGE NOTE ADULT - MEDICATION SUMMARY - MEDICATIONS TO TAKE
I will START or STAY ON the medications listed below when I get home from the hospital:    vitamin d  -- 5000 unit(s)  once a day  -- Indication: For vitamins    aspirin 325 mg oral tablet  -- 1 tab(s) by mouth once a day  -- Indication: For Heart health    metFORMIN 500 mg oral tablet, extended release  -- 1 tab(s) by mouth once a day  HOLD FOR 2 days  -- Indication: For diabetes    Zofran 4 mg oral tablet  -- 1 tab(s) by mouth every 8 hours, As Needed   -- Indication: For nausea    atorvastatin 10 mg oral tablet  -- 1 tab(s) by mouth once a day (at bedtime)  -- Indication: For Hyperlipdiemia    metoprolol tartrate 25 mg oral tablet  -- 1 tab(s) by mouth 2 times a day  -- Indication: For Htn    ProAir HFA 90 mcg/inh inhalation aerosol  -- 2 puff(s) inhaled every 4 hours - for cough  -- For inhalation only.  It is very important that you take or use this exactly as directed.  Do not skip doses or discontinue unless directed by your doctor.  Obtain medical advice before taking any non-prescription drugs as some may affect the action of this medication.  Shake well before use.      -- Indication: For Asthma    Robitussin Mucus + Chest Congestion 100 mg/5 mL oral liquid  -- 5 milliliter(s) by mouth every 4 hours   -- Medication should be taken with plenty of water.    -- Indication: For cough    azithromycin 500 mg oral tablet  -- 1 tab(s) by mouth once a day   -- Do not take dairy products, antacids, or iron preparations within one hour of this medication.  Finish all this medication unless otherwise directed by prescriber.    -- Indication: For bronchitis    omeprazole 20 mg oral delayed release tablet  -- 1 tab(s) by mouth once a day  -- Indication: For gerd

## 2017-11-04 NOTE — ED PROVIDER NOTE - PSYCHIATRIC, MLM
Alert and oriented to person, place, time/situation. normal mood and affect. no apparent risk to self or others. (after glucose)

## 2017-11-04 NOTE — DISCHARGE NOTE ADULT - PATIENT PORTAL LINK FT
“You can access the [facility] Patient Portal, offered by [facility], by registering with the following website: [email or web address]”

## 2017-11-04 NOTE — DISCHARGE NOTE ADULT - PLAN OF CARE
resolved, secondary to metfromin ADVISED patient to hold until monday  advised to reduce dose to 1 tab a day bid  advised to see primary care resolved secondary to hypoglycemia CHECK HEMOGLOBIN a1c home meds z pack and robitusissin home inhalers

## 2017-11-05 LAB
CULTURE RESULTS: SIGNIFICANT CHANGE UP
SPECIMEN SOURCE: SIGNIFICANT CHANGE UP

## 2017-11-09 LAB
CULTURE RESULTS: SIGNIFICANT CHANGE UP
CULTURE RESULTS: SIGNIFICANT CHANGE UP
SPECIMEN SOURCE: SIGNIFICANT CHANGE UP
SPECIMEN SOURCE: SIGNIFICANT CHANGE UP

## 2017-12-25 ENCOUNTER — EMERGENCY (EMERGENCY)
Facility: HOSPITAL | Age: 67
LOS: 1 days | Discharge: HOME | End: 2017-12-25
Attending: EMERGENCY MEDICINE | Admitting: EMERGENCY MEDICINE
Payer: COMMERCIAL

## 2017-12-25 VITALS
DIASTOLIC BLOOD PRESSURE: 84 MMHG | HEART RATE: 91 BPM | TEMPERATURE: 98 F | SYSTOLIC BLOOD PRESSURE: 135 MMHG | OXYGEN SATURATION: 98 % | RESPIRATION RATE: 16 BRPM | WEIGHT: 139.99 LBS | HEIGHT: 60 IN

## 2017-12-25 LAB
ALBUMIN SERPL ELPH-MCNC: 3.8 G/DL — SIGNIFICANT CHANGE UP (ref 3.3–5.2)
ALP SERPL-CCNC: 156 U/L — HIGH (ref 40–120)
ALT FLD-CCNC: 47 U/L — HIGH
ANION GAP SERPL CALC-SCNC: 16 MMOL/L — SIGNIFICANT CHANGE UP (ref 5–17)
APTT BLD: 29.6 SEC — SIGNIFICANT CHANGE UP (ref 27.5–37.4)
AST SERPL-CCNC: 79 U/L — HIGH
BASOPHILS # BLD AUTO: 0 K/UL — SIGNIFICANT CHANGE UP (ref 0–0.2)
BASOPHILS NFR BLD AUTO: 0.5 % — SIGNIFICANT CHANGE UP (ref 0–2)
BILIRUB SERPL-MCNC: 0.5 MG/DL — SIGNIFICANT CHANGE UP (ref 0.4–2)
BUN SERPL-MCNC: 21 MG/DL — HIGH (ref 8–20)
CALCIUM SERPL-MCNC: 9.2 MG/DL — SIGNIFICANT CHANGE UP (ref 8.6–10.2)
CHLORIDE SERPL-SCNC: 101 MMOL/L — SIGNIFICANT CHANGE UP (ref 98–107)
CO2 SERPL-SCNC: 23 MMOL/L — SIGNIFICANT CHANGE UP (ref 22–29)
CREAT SERPL-MCNC: 1.53 MG/DL — HIGH (ref 0.5–1.3)
EOSINOPHIL # BLD AUTO: 0.2 K/UL — SIGNIFICANT CHANGE UP (ref 0–0.5)
EOSINOPHIL NFR BLD AUTO: 4.1 % — SIGNIFICANT CHANGE UP (ref 0–6)
GLUCOSE SERPL-MCNC: 65 MG/DL — LOW (ref 70–115)
HCT VFR BLD CALC: 31.8 % — LOW (ref 37–47)
HGB BLD-MCNC: 10.7 G/DL — LOW (ref 12–16)
LYMPHOCYTES # BLD AUTO: 0.8 K/UL — LOW (ref 1–4.8)
LYMPHOCYTES # BLD AUTO: 19 % — LOW (ref 20–55)
MCHC RBC-ENTMCNC: 27.2 PG — SIGNIFICANT CHANGE UP (ref 27–31)
MCHC RBC-ENTMCNC: 33.6 G/DL — SIGNIFICANT CHANGE UP (ref 32–36)
MCV RBC AUTO: 80.9 FL — LOW (ref 81–99)
MONOCYTES # BLD AUTO: 0.4 K/UL — SIGNIFICANT CHANGE UP (ref 0–0.8)
MONOCYTES NFR BLD AUTO: 9.4 % — SIGNIFICANT CHANGE UP (ref 3–10)
NEUTROPHILS # BLD AUTO: 2.8 K/UL — SIGNIFICANT CHANGE UP (ref 1.8–8)
NEUTROPHILS NFR BLD AUTO: 66.5 % — SIGNIFICANT CHANGE UP (ref 37–73)
PLATELET # BLD AUTO: 173 K/UL — SIGNIFICANT CHANGE UP (ref 150–400)
POTASSIUM SERPL-MCNC: 3.7 MMOL/L — SIGNIFICANT CHANGE UP (ref 3.5–5.3)
POTASSIUM SERPL-SCNC: 3.7 MMOL/L — SIGNIFICANT CHANGE UP (ref 3.5–5.3)
PROT SERPL-MCNC: 6.7 G/DL — SIGNIFICANT CHANGE UP (ref 6.6–8.7)
RBC # BLD: 3.93 M/UL — LOW (ref 4.4–5.2)
RBC # FLD: 15.2 % — SIGNIFICANT CHANGE UP (ref 11–15.6)
SODIUM SERPL-SCNC: 140 MMOL/L — SIGNIFICANT CHANGE UP (ref 135–145)
WBC # BLD: 4.2 K/UL — LOW (ref 4.8–10.8)
WBC # FLD AUTO: 4.2 K/UL — LOW (ref 4.8–10.8)

## 2017-12-25 PROCEDURE — 70450 CT HEAD/BRAIN W/O DYE: CPT | Mod: 26

## 2017-12-25 PROCEDURE — 99284 EMERGENCY DEPT VISIT MOD MDM: CPT | Mod: 25

## 2017-12-25 PROCEDURE — 76705 ECHO EXAM OF ABDOMEN: CPT

## 2017-12-25 PROCEDURE — 70450 CT HEAD/BRAIN W/O DYE: CPT

## 2017-12-25 PROCEDURE — 85730 THROMBOPLASTIN TIME PARTIAL: CPT

## 2017-12-25 PROCEDURE — 76705 ECHO EXAM OF ABDOMEN: CPT | Mod: 26

## 2017-12-25 PROCEDURE — 96374 THER/PROPH/DIAG INJ IV PUSH: CPT

## 2017-12-25 PROCEDURE — 85027 COMPLETE CBC AUTOMATED: CPT

## 2017-12-25 PROCEDURE — T1013: CPT

## 2017-12-25 PROCEDURE — 36415 COLL VENOUS BLD VENIPUNCTURE: CPT

## 2017-12-25 PROCEDURE — 93010 ELECTROCARDIOGRAM REPORT: CPT

## 2017-12-25 PROCEDURE — 93005 ELECTROCARDIOGRAM TRACING: CPT

## 2017-12-25 PROCEDURE — 99285 EMERGENCY DEPT VISIT HI MDM: CPT | Mod: 25

## 2017-12-25 PROCEDURE — 80053 COMPREHEN METABOLIC PANEL: CPT

## 2017-12-25 RX ORDER — ONDANSETRON 8 MG/1
4 TABLET, FILM COATED ORAL ONCE
Qty: 0 | Refills: 0 | Status: COMPLETED | OUTPATIENT
Start: 2017-12-25 | End: 2017-12-25

## 2017-12-25 RX ORDER — SODIUM CHLORIDE 9 MG/ML
3 INJECTION INTRAMUSCULAR; INTRAVENOUS; SUBCUTANEOUS ONCE
Qty: 0 | Refills: 0 | Status: COMPLETED | OUTPATIENT
Start: 2017-12-25 | End: 2017-12-25

## 2017-12-25 RX ADMIN — ONDANSETRON 4 MILLIGRAM(S): 8 TABLET, FILM COATED ORAL at 09:02

## 2017-12-25 RX ADMIN — SODIUM CHLORIDE 3 MILLILITER(S): 9 INJECTION INTRAMUSCULAR; INTRAVENOUS; SUBCUTANEOUS at 08:19

## 2017-12-25 NOTE — ED PROVIDER NOTE - MEDICAL DECISION MAKING DETAILS
PT WITH HEADACHE AND VOMITING. GI SYMPTOMS BETTER BY TIME OF EVALUATION  HEADACHE IMPROVED AFTER MEDS  ELEVATED LFTS NOTED AND US ORDERED TO EVALUATE FOR GALL STONES PT WITH HEADACHE AND VOMITING. GI SYMPTOMS BETTER BY TIME OF EVALUATION  HEADACHE IMPROVED AFTER MEDS  US NO ACUTE PATHOLOGY. OK TO D/C  ELEVATED LFTS NOTED AND US ORDERED TO EVALUATE FOR GALL STONES

## 2017-12-25 NOTE — ED STATDOCS - PROGRESS NOTE DETAILS
68 y/o F pt presents to ED c/o a HA, cough and vomiting that onset last night. Actively vomiting in super track. Unknown if she hit her head. No further complaints at this time. Will be sent to Main for further evaluation.

## 2017-12-25 NOTE — ED ADULT NURSE NOTE - OBJECTIVE STATEMENT
pt states that she has had a headache for 1 month was seen by her PMD but states that the headache returned today with nausea.

## 2017-12-25 NOTE — ED PROVIDER NOTE - OBJECTIVE STATEMENT
PT WITH VOMITING. HAS HX OF VOMITING WHEN SHE COUGHS FOR A LONG TIME. STATES 2 NIGHTS AGO SHE HAD DIARRHEA AND THEN LAST NIGHT STARTED VOMITING. STILL HAD VOMITING THIS MORNING AND HEADACHE. NO OTHER SYMPTOMS SUCH AS FEVER, CHILLS, CP, SOB, DYSURIA.  MED HX Anemia    Diabetes mellitus    Dyslipidemia    GERD (gastroesophageal reflux disease)    Hypertension    Myocardial infarction    Sleep apnea

## 2017-12-26 ENCOUNTER — APPOINTMENT (OUTPATIENT)
Dept: PULMONOLOGY | Facility: CLINIC | Age: 67
End: 2017-12-26

## 2017-12-28 ENCOUNTER — INPATIENT (INPATIENT)
Facility: HOSPITAL | Age: 67
LOS: 7 days | Discharge: HOME | DRG: 638 | End: 2018-01-05
Attending: INTERNAL MEDICINE | Admitting: HOSPITALIST
Payer: COMMERCIAL

## 2017-12-28 VITALS
DIASTOLIC BLOOD PRESSURE: 81 MMHG | HEART RATE: 65 BPM | SYSTOLIC BLOOD PRESSURE: 142 MMHG | WEIGHT: 149.91 LBS | TEMPERATURE: 98 F | HEIGHT: 62 IN | RESPIRATION RATE: 16 BRPM | OXYGEN SATURATION: 99 %

## 2017-12-28 PROCEDURE — 99291 CRITICAL CARE FIRST HOUR: CPT

## 2017-12-28 NOTE — ED ADULT TRIAGE NOTE - CHIEF COMPLAINT QUOTE
As per EMS pt was laying on the couch unresponsive. Pt Blood sugar was 23. Amp of D50 was give. Bs 222. Pt awake and alert. As per EMS pt was laying on the couch unresponsive. Pt Blood sugar was 23. Amp of D50 was give. Bs 222. Pt awake and alert.  Finger stick her 122 prt drinking 8 oz of milf.

## 2017-12-28 NOTE — ED PROVIDER NOTE - CONSTITUTIONAL, MLM
normal... Somnolent but arousable, alert, oriented to person, place, time/situation and in no apparent distress.

## 2017-12-28 NOTE — ED ADULT NURSE NOTE - CHIEF COMPLAINT QUOTE
As per EMS pt was laying on the couch unresponsive. Pt Blood sugar was 23. Amp of D50 was give. Bs 222. Pt awake and alert.  Finger stick her 122 prt drinking 8 oz of milf.

## 2017-12-28 NOTE — ED PROVIDER NOTE - OBJECTIVE STATEMENT
66 y/o F pt with PMHx of DM, dyslipidemia, GERD, HTN  BIBA presents to ED c/o hypoglycemia. As per EMS, pt was found on couch unresponsive with a BS of 22. She was given D50 with relief. Second BS was 222. Pt offers no physical complaints at this time. She is currently on Prandin and Metformin. Pt denies fever, CP, SOB, nausea, vomiting, abdominal pain, headache, and dizziness. No further complaints at this time.

## 2017-12-28 NOTE — ED ADULT NURSE NOTE - OBJECTIVE STATEMENT
pt awake and alert, baseline mental status, brought to ED by family for low blood sugar. per family, pt monitors sugar at home by herself or other family members monitor sugar. family states sugar was in 50s earlier this AM, they gave pt food, sugar and sugar water and sugar was in 70's. family states pt denies fever, chills, night sweats, n/v/d, abd pain any recent illness or sick contacts. pt denies chest pain or SOB. pt denies headache dizziness or lightheadedness. pt lying comfortably, repeat FS 76- per MD, provided pt with meal, will repeat FS @ 0100.

## 2017-12-29 VITALS
OXYGEN SATURATION: 97 % | DIASTOLIC BLOOD PRESSURE: 67 MMHG | TEMPERATURE: 97 F | RESPIRATION RATE: 16 BRPM | SYSTOLIC BLOOD PRESSURE: 111 MMHG | HEART RATE: 72 BPM

## 2017-12-29 DIAGNOSIS — E83.42 HYPOMAGNESEMIA: ICD-10-CM

## 2017-12-29 DIAGNOSIS — E16.2 HYPOGLYCEMIA, UNSPECIFIED: ICD-10-CM

## 2017-12-29 DIAGNOSIS — I10 ESSENTIAL (PRIMARY) HYPERTENSION: ICD-10-CM

## 2017-12-29 DIAGNOSIS — Z29.9 ENCOUNTER FOR PROPHYLACTIC MEASURES, UNSPECIFIED: ICD-10-CM

## 2017-12-29 DIAGNOSIS — R74.0 NONSPECIFIC ELEVATION OF LEVELS OF TRANSAMINASE AND LACTIC ACID DEHYDROGENASE [LDH]: ICD-10-CM

## 2017-12-29 DIAGNOSIS — R93.2 ABNORMAL FINDINGS ON DIAGNOSTIC IMAGING OF LIVER AND BILIARY TRACT: ICD-10-CM

## 2017-12-29 DIAGNOSIS — E11.8 TYPE 2 DIABETES MELLITUS WITH UNSPECIFIED COMPLICATIONS: ICD-10-CM

## 2017-12-29 DIAGNOSIS — D61.818 OTHER PANCYTOPENIA: ICD-10-CM

## 2017-12-29 LAB
ALBUMIN SERPL ELPH-MCNC: 3 G/DL — LOW (ref 3.3–5.2)
ALBUMIN SERPL ELPH-MCNC: 3.2 G/DL — LOW (ref 3.3–5.2)
ALP SERPL-CCNC: 153 U/L — HIGH (ref 40–120)
ALP SERPL-CCNC: 155 U/L — HIGH (ref 40–120)
ALT FLD-CCNC: 46 U/L — HIGH
ALT FLD-CCNC: 48 U/L — HIGH
ANION GAP SERPL CALC-SCNC: 12 MMOL/L — SIGNIFICANT CHANGE UP (ref 5–17)
ANION GAP SERPL CALC-SCNC: 13 MMOL/L — SIGNIFICANT CHANGE UP (ref 5–17)
ANISOCYTOSIS BLD QL: SLIGHT — SIGNIFICANT CHANGE UP
APPEARANCE UR: CLEAR — SIGNIFICANT CHANGE UP
APTT BLD: 35.6 SEC — SIGNIFICANT CHANGE UP (ref 27.5–37.4)
APTT BLD: 38.1 SEC — HIGH (ref 27.5–37.4)
AST SERPL-CCNC: 107 U/L — HIGH
AST SERPL-CCNC: 90 U/L — HIGH
BACTERIA # UR AUTO: ABNORMAL
BASOPHILS # BLD AUTO: 0 K/UL — SIGNIFICANT CHANGE UP (ref 0–0.2)
BASOPHILS NFR BLD AUTO: 0.4 % — SIGNIFICANT CHANGE UP (ref 0–2)
BILIRUB SERPL-MCNC: 0.2 MG/DL — LOW (ref 0.4–2)
BILIRUB SERPL-MCNC: 0.4 MG/DL — SIGNIFICANT CHANGE UP (ref 0.4–2)
BILIRUB UR-MCNC: NEGATIVE — SIGNIFICANT CHANGE UP
BLD GP AB SCN SERPL QL: SIGNIFICANT CHANGE UP
BUN SERPL-MCNC: 18 MG/DL — SIGNIFICANT CHANGE UP (ref 8–20)
BUN SERPL-MCNC: 19 MG/DL — SIGNIFICANT CHANGE UP (ref 8–20)
BURR CELLS BLD QL SMEAR: PRESENT — SIGNIFICANT CHANGE UP
CALCIUM SERPL-MCNC: 8 MG/DL — LOW (ref 8.6–10.2)
CALCIUM SERPL-MCNC: 8.4 MG/DL — LOW (ref 8.6–10.2)
CHLORIDE SERPL-SCNC: 101 MMOL/L — SIGNIFICANT CHANGE UP (ref 98–107)
CHLORIDE SERPL-SCNC: 104 MMOL/L — SIGNIFICANT CHANGE UP (ref 98–107)
CO2 SERPL-SCNC: 21 MMOL/L — LOW (ref 22–29)
CO2 SERPL-SCNC: 22 MMOL/L — SIGNIFICANT CHANGE UP (ref 22–29)
COLOR SPEC: YELLOW — SIGNIFICANT CHANGE UP
CORTIS AM PEAK SERPL-MCNC: 14.1 UG/DL — SIGNIFICANT CHANGE UP (ref 6–18.4)
CREAT SERPL-MCNC: 0.86 MG/DL — SIGNIFICANT CHANGE UP (ref 0.5–1.3)
CREAT SERPL-MCNC: 0.88 MG/DL — SIGNIFICANT CHANGE UP (ref 0.5–1.3)
CRP SERPL-MCNC: 0.6 MG/DL — HIGH (ref 0–0.4)
DIFF PNL FLD: NEGATIVE — SIGNIFICANT CHANGE UP
ELLIPTOCYTES BLD QL SMEAR: SLIGHT — SIGNIFICANT CHANGE UP
EOSINOPHIL # BLD AUTO: 0.1 K/UL — SIGNIFICANT CHANGE UP (ref 0–0.5)
EOSINOPHIL NFR BLD AUTO: 2 % — SIGNIFICANT CHANGE UP (ref 0–5)
EOSINOPHIL NFR BLD AUTO: 3.8 % — SIGNIFICANT CHANGE UP (ref 0–6)
EPI CELLS # UR: SIGNIFICANT CHANGE UP
ERYTHROCYTE [SEDIMENTATION RATE] IN BLOOD: 22 MM/HR — HIGH (ref 0–20)
FERRITIN SERPL-MCNC: 97.6 NG/ML — SIGNIFICANT CHANGE UP (ref 11–306)
FOLATE SERPL-MCNC: 4.6 NG/ML — SIGNIFICANT CHANGE UP (ref 4–16)
GLUCOSE BLDC GLUCOMTR-MCNC: 127 MG/DL — HIGH (ref 70–99)
GLUCOSE BLDC GLUCOMTR-MCNC: 159 MG/DL — HIGH (ref 70–99)
GLUCOSE BLDC GLUCOMTR-MCNC: 161 MG/DL — HIGH (ref 70–99)
GLUCOSE BLDC GLUCOMTR-MCNC: 178 MG/DL — HIGH (ref 70–99)
GLUCOSE BLDC GLUCOMTR-MCNC: 220 MG/DL — HIGH (ref 70–99)
GLUCOSE BLDC GLUCOMTR-MCNC: 252 MG/DL — HIGH (ref 70–99)
GLUCOSE SERPL-MCNC: 230 MG/DL — HIGH (ref 70–115)
GLUCOSE SERPL-MCNC: 53 MG/DL — LOW (ref 70–115)
GLUCOSE UR QL: 250 MG/DL
HCT VFR BLD CALC: 25.2 % — LOW (ref 37–47)
HCT VFR BLD CALC: 27.2 % — LOW (ref 37–47)
HGB BLD-MCNC: 8.5 G/DL — LOW (ref 12–16)
HGB BLD-MCNC: 9.1 G/DL — LOW (ref 12–16)
INR BLD: 1.07 RATIO — SIGNIFICANT CHANGE UP (ref 0.88–1.16)
INR BLD: 1.12 RATIO — SIGNIFICANT CHANGE UP (ref 0.88–1.16)
KETONES UR-MCNC: NEGATIVE — SIGNIFICANT CHANGE UP
LACTATE SERPL-SCNC: 1.9 MMOL/L — SIGNIFICANT CHANGE UP (ref 0.5–2)
LDH SERPL L TO P-CCNC: 241 U/L — HIGH (ref 98–192)
LEUKOCYTE ESTERASE UR-ACNC: ABNORMAL
LYMPHOCYTES # BLD AUTO: 0.5 K/UL — LOW (ref 1–4.8)
LYMPHOCYTES # BLD AUTO: 17 % — LOW (ref 20–55)
LYMPHOCYTES # BLD AUTO: 18.5 % — LOW (ref 20–55)
MAGNESIUM SERPL-MCNC: 1 MG/DL — CRITICAL LOW (ref 1.6–2.6)
MAGNESIUM SERPL-MCNC: 1.4 MG/DL — LOW (ref 1.6–2.6)
MANUAL DIF COMMENT BLD-IMP: SIGNIFICANT CHANGE UP
MCHC RBC-ENTMCNC: 27.2 PG — SIGNIFICANT CHANGE UP (ref 27–31)
MCHC RBC-ENTMCNC: 27.2 PG — SIGNIFICANT CHANGE UP (ref 27–31)
MCHC RBC-ENTMCNC: 33.5 G/DL — SIGNIFICANT CHANGE UP (ref 32–36)
MCHC RBC-ENTMCNC: 33.7 G/DL — SIGNIFICANT CHANGE UP (ref 32–36)
MCV RBC AUTO: 80.5 FL — LOW (ref 81–99)
MCV RBC AUTO: 81.4 FL — SIGNIFICANT CHANGE UP (ref 81–99)
MICROCYTES BLD QL: SLIGHT — SIGNIFICANT CHANGE UP
MONOCYTES # BLD AUTO: 0.2 K/UL — SIGNIFICANT CHANGE UP (ref 0–0.8)
MONOCYTES NFR BLD AUTO: 5 % — SIGNIFICANT CHANGE UP (ref 3–10)
MONOCYTES NFR BLD AUTO: 6 % — SIGNIFICANT CHANGE UP (ref 3–10)
NEUTROPHILS # BLD AUTO: 1.9 K/UL — SIGNIFICANT CHANGE UP (ref 1.8–8)
NEUTROPHILS NFR BLD AUTO: 70.9 % — SIGNIFICANT CHANGE UP (ref 37–73)
NEUTROPHILS NFR BLD AUTO: 76 % — HIGH (ref 37–73)
NITRITE UR-MCNC: NEGATIVE — SIGNIFICANT CHANGE UP
PH UR: 6 — SIGNIFICANT CHANGE UP (ref 5–8)
PLAT MORPH BLD: NORMAL — SIGNIFICANT CHANGE UP
PLATELET # BLD AUTO: 127 K/UL — LOW (ref 150–400)
PLATELET # BLD AUTO: 151 K/UL — SIGNIFICANT CHANGE UP (ref 150–400)
POIKILOCYTOSIS BLD QL AUTO: SLIGHT — SIGNIFICANT CHANGE UP
POTASSIUM SERPL-MCNC: 3.8 MMOL/L — SIGNIFICANT CHANGE UP (ref 3.5–5.3)
POTASSIUM SERPL-MCNC: 4 MMOL/L — SIGNIFICANT CHANGE UP (ref 3.5–5.3)
POTASSIUM SERPL-SCNC: 3.8 MMOL/L — SIGNIFICANT CHANGE UP (ref 3.5–5.3)
POTASSIUM SERPL-SCNC: 4 MMOL/L — SIGNIFICANT CHANGE UP (ref 3.5–5.3)
PROCALCITONIN SERPL-MCNC: 0.24 NG/ML — HIGH (ref 0–0.04)
PROT SERPL-MCNC: 5.5 G/DL — LOW (ref 6.6–8.7)
PROT SERPL-MCNC: 5.9 G/DL — LOW (ref 6.6–8.7)
PROT UR-MCNC: NEGATIVE MG/DL — SIGNIFICANT CHANGE UP
PROTHROM AB SERPL-ACNC: 11.8 SEC — SIGNIFICANT CHANGE UP (ref 9.8–12.7)
PROTHROM AB SERPL-ACNC: 12.4 SEC — SIGNIFICANT CHANGE UP (ref 9.8–12.7)
RAPID RVP RESULT: SIGNIFICANT CHANGE UP
RBC # BLD: 3.13 M/UL — LOW (ref 4.4–5.2)
RBC # BLD: 3.25 M/UL — LOW (ref 4.4–5.2)
RBC # BLD: 3.34 M/UL — LOW (ref 4.4–5.2)
RBC # FLD: 15 % — SIGNIFICANT CHANGE UP (ref 11–15.6)
RBC # FLD: 15.1 % — SIGNIFICANT CHANGE UP (ref 11–15.6)
RBC BLD AUTO: SIGNIFICANT CHANGE UP
RBC CASTS # UR COMP ASSIST: SIGNIFICANT CHANGE UP /HPF (ref 0–4)
RETICS #: 3.4 K/UL — LOW (ref 25–125)
RETICS/RBC NFR: 1 % — SIGNIFICANT CHANGE UP (ref 0.5–2.5)
SODIUM SERPL-SCNC: 135 MMOL/L — SIGNIFICANT CHANGE UP (ref 135–145)
SODIUM SERPL-SCNC: 138 MMOL/L — SIGNIFICANT CHANGE UP (ref 135–145)
SP GR SPEC: 1.01 — SIGNIFICANT CHANGE UP (ref 1.01–1.02)
T3 SERPL-MCNC: 107 NG/DL — SIGNIFICANT CHANGE UP (ref 80–200)
T4 AB SER-ACNC: 10.3 UG/DL — SIGNIFICANT CHANGE UP (ref 4.5–12)
TROPONIN T SERPL-MCNC: <0.01 NG/ML — SIGNIFICANT CHANGE UP (ref 0–0.06)
TSH SERPL-MCNC: 0.8 UIU/ML — SIGNIFICANT CHANGE UP (ref 0.27–4.2)
TYPE + AB SCN PNL BLD: SIGNIFICANT CHANGE UP
URATE SERPL-MCNC: 4.6 MG/DL — SIGNIFICANT CHANGE UP (ref 2.4–5.7)
UROBILINOGEN FLD QL: NEGATIVE MG/DL — SIGNIFICANT CHANGE UP
VIT B12 SERPL-MCNC: 516 PG/ML — SIGNIFICANT CHANGE UP (ref 180–914)
WBC # BLD: 2.6 K/UL — LOW (ref 4.8–10.8)
WBC # BLD: 2.6 K/UL — LOW (ref 4.8–10.8)
WBC # FLD AUTO: 2.6 K/UL — LOW (ref 4.8–10.8)
WBC # FLD AUTO: 2.6 K/UL — LOW (ref 4.8–10.8)
WBC UR QL: ABNORMAL

## 2017-12-29 PROCEDURE — 71010: CPT | Mod: 26

## 2017-12-29 PROCEDURE — 74177 CT ABD & PELVIS W/CONTRAST: CPT | Mod: 26

## 2017-12-29 PROCEDURE — 12345: CPT | Mod: NC

## 2017-12-29 PROCEDURE — 99223 1ST HOSP IP/OBS HIGH 75: CPT

## 2017-12-29 RX ORDER — FERROUS SULFATE 325(65) MG
325 TABLET ORAL DAILY
Qty: 0 | Refills: 0 | Status: DISCONTINUED | OUTPATIENT
Start: 2017-12-29 | End: 2018-01-05

## 2017-12-29 RX ORDER — MAGNESIUM SULFATE 500 MG/ML
2 VIAL (ML) INJECTION ONCE
Qty: 0 | Refills: 0 | Status: COMPLETED | OUTPATIENT
Start: 2017-12-29 | End: 2017-12-29

## 2017-12-29 RX ORDER — MAGNESIUM OXIDE 400 MG ORAL TABLET 241.3 MG
400 TABLET ORAL
Qty: 0 | Refills: 0 | Status: COMPLETED | OUTPATIENT
Start: 2017-12-29 | End: 2017-12-31

## 2017-12-29 RX ORDER — SODIUM CHLORIDE 9 MG/ML
1000 INJECTION, SOLUTION INTRAVENOUS
Qty: 0 | Refills: 0 | Status: DISCONTINUED | OUTPATIENT
Start: 2017-12-29 | End: 2017-12-30

## 2017-12-29 RX ORDER — INSULIN LISPRO 100/ML
VIAL (ML) SUBCUTANEOUS
Qty: 0 | Refills: 0 | Status: DISCONTINUED | OUTPATIENT
Start: 2017-12-29 | End: 2018-01-05

## 2017-12-29 RX ORDER — MELOXICAM 15 MG/1
0 TABLET ORAL
Qty: 0 | Refills: 0 | COMMUNITY

## 2017-12-29 RX ORDER — ASCORBIC ACID 60 MG
500 TABLET,CHEWABLE ORAL DAILY
Qty: 0 | Refills: 0 | Status: DISCONTINUED | OUTPATIENT
Start: 2017-12-29 | End: 2018-01-05

## 2017-12-29 RX ORDER — SODIUM CHLORIDE 9 MG/ML
1000 INJECTION, SOLUTION INTRAVENOUS
Qty: 0 | Refills: 0 | Status: DISCONTINUED | OUTPATIENT
Start: 2017-12-29 | End: 2018-01-05

## 2017-12-29 RX ORDER — ASPIRIN/CALCIUM CARB/MAGNESIUM 324 MG
81 TABLET ORAL DAILY
Qty: 0 | Refills: 0 | Status: DISCONTINUED | OUTPATIENT
Start: 2017-12-29 | End: 2018-01-05

## 2017-12-29 RX ORDER — PANTOPRAZOLE SODIUM 20 MG/1
40 TABLET, DELAYED RELEASE ORAL
Qty: 0 | Refills: 0 | Status: DISCONTINUED | OUTPATIENT
Start: 2017-12-29 | End: 2018-01-05

## 2017-12-29 RX ORDER — DEXTROSE 50 % IN WATER 50 %
1 SYRINGE (ML) INTRAVENOUS ONCE
Qty: 0 | Refills: 0 | Status: DISCONTINUED | OUTPATIENT
Start: 2017-12-29 | End: 2018-01-05

## 2017-12-29 RX ORDER — MAGNESIUM SULFATE 500 MG/ML
2 VIAL (ML) INJECTION
Qty: 0 | Refills: 0 | Status: COMPLETED | OUTPATIENT
Start: 2017-12-29 | End: 2017-12-29

## 2017-12-29 RX ORDER — LOSARTAN POTASSIUM 100 MG/1
100 TABLET, FILM COATED ORAL DAILY
Qty: 0 | Refills: 0 | Status: DISCONTINUED | OUTPATIENT
Start: 2017-12-29 | End: 2018-01-05

## 2017-12-29 RX ORDER — DEXTROSE 50 % IN WATER 50 %
25 SYRINGE (ML) INTRAVENOUS ONCE
Qty: 0 | Refills: 0 | Status: DISCONTINUED | OUTPATIENT
Start: 2017-12-29 | End: 2018-01-05

## 2017-12-29 RX ORDER — DEXTROSE 50 % IN WATER 50 %
12.5 SYRINGE (ML) INTRAVENOUS ONCE
Qty: 0 | Refills: 0 | Status: DISCONTINUED | OUTPATIENT
Start: 2017-12-29 | End: 2018-01-05

## 2017-12-29 RX ORDER — ENOXAPARIN SODIUM 100 MG/ML
40 INJECTION SUBCUTANEOUS DAILY
Qty: 0 | Refills: 0 | Status: DISCONTINUED | OUTPATIENT
Start: 2017-12-29 | End: 2018-01-05

## 2017-12-29 RX ORDER — TRAMADOL HYDROCHLORIDE 50 MG/1
50 TABLET ORAL
Qty: 0 | Refills: 0 | Status: DISCONTINUED | OUTPATIENT
Start: 2017-12-29 | End: 2017-12-31

## 2017-12-29 RX ORDER — BACLOFEN 100 %
0 POWDER (GRAM) MISCELLANEOUS
Qty: 0 | Refills: 0 | COMMUNITY

## 2017-12-29 RX ORDER — GLUCAGON INJECTION, SOLUTION 0.5 MG/.1ML
1 INJECTION, SOLUTION SUBCUTANEOUS ONCE
Qty: 0 | Refills: 0 | Status: DISCONTINUED | OUTPATIENT
Start: 2017-12-29 | End: 2018-01-05

## 2017-12-29 RX ADMIN — Medication 81 MILLIGRAM(S): at 13:46

## 2017-12-29 RX ADMIN — PANTOPRAZOLE SODIUM 40 MILLIGRAM(S): 20 TABLET, DELAYED RELEASE ORAL at 05:38

## 2017-12-29 RX ADMIN — TRAMADOL HYDROCHLORIDE 50 MILLIGRAM(S): 50 TABLET ORAL at 13:45

## 2017-12-29 RX ADMIN — Medication: at 13:27

## 2017-12-29 RX ADMIN — LOSARTAN POTASSIUM 100 MILLIGRAM(S): 100 TABLET, FILM COATED ORAL at 05:38

## 2017-12-29 RX ADMIN — SODIUM CHLORIDE 150 MILLILITER(S): 9 INJECTION, SOLUTION INTRAVENOUS at 08:00

## 2017-12-29 RX ADMIN — SODIUM CHLORIDE 150 MILLILITER(S): 9 INJECTION, SOLUTION INTRAVENOUS at 04:30

## 2017-12-29 RX ADMIN — Medication 325 MILLIGRAM(S): at 13:46

## 2017-12-29 RX ADMIN — Medication 500 MILLIGRAM(S): at 13:45

## 2017-12-29 RX ADMIN — ENOXAPARIN SODIUM 40 MILLIGRAM(S): 100 INJECTION SUBCUTANEOUS at 13:46

## 2017-12-29 RX ADMIN — TRAMADOL HYDROCHLORIDE 50 MILLIGRAM(S): 50 TABLET ORAL at 14:20

## 2017-12-29 RX ADMIN — MAGNESIUM OXIDE 400 MG ORAL TABLET 400 MILLIGRAM(S): 241.3 TABLET ORAL at 10:42

## 2017-12-29 RX ADMIN — Medication 50 GRAM(S): at 10:42

## 2017-12-29 RX ADMIN — MAGNESIUM OXIDE 400 MG ORAL TABLET 400 MILLIGRAM(S): 241.3 TABLET ORAL at 13:46

## 2017-12-29 RX ADMIN — Medication 50 GRAM(S): at 03:10

## 2017-12-29 RX ADMIN — MAGNESIUM OXIDE 400 MG ORAL TABLET 400 MILLIGRAM(S): 241.3 TABLET ORAL at 18:08

## 2017-12-29 RX ADMIN — SODIUM CHLORIDE 150 MILLILITER(S): 9 INJECTION, SOLUTION INTRAVENOUS at 01:56

## 2017-12-29 NOTE — ED ADULT NURSE REASSESSMENT NOTE - NS ED NURSE REASSESS COMMENT FT1
pt ambulating with steady gait to bathroom, in no apparent distress. Urine specimen cup provided, will send urine when pt returns.

## 2017-12-29 NOTE — PROGRESS NOTE ADULT - ASSESSMENT
68 y/o female with hypoglycemia, hypomagnesemia, HTN, DM II, chronic elevation of transaminases and c/o RLQ abdominal pain, N/V/F with constipation, admitted for hypoglycemia, symptomatic, now resolved.  No new complaints. 68 y/o female with hypoglycemia, hypomagnesemia, HTN, DM II, chronic elevation of transaminases and c/o RLQ abdominal pain, N/V/F with constipation, admitted for hypoglycemia, symptomatic, now resolved.  No new complaints.    ADDENDUM TO AM NOTE:  Pt states that she has not taken any diabetes medication in the past month ever since last episode of hypoglycemia 1 month ago.  Still awaiting call back from family member (daughter).  Per [facility] Pt filled:  In June- Tujamaro 33u qhs  In Nov- Victoza 1.2 mg qd, Farxiga 5mg qd, Metformin ER 2g qd

## 2017-12-29 NOTE — H&P ADULT - HISTORY OF PRESENT ILLNESS
66 y/o female with h/o DM on oral hypoglycemics was found unresponsive. Accu check: was 22 mg/dl. repeat accu check in the Er after treatment was 57.

## 2017-12-29 NOTE — CONSULT NOTE ADULT - SUBJECTIVE AND OBJECTIVE BOX
Patient is a 67y old  Female who presents with a chief complaint of hypoglycemia (29 Dec 2017 03:50) referred for GI evaluation of lower abdominal pain and liver cysts on CT scan      HPI:  68 y/o female with h/o DM on oral hypoglycemics was found unresponsive. Accu check: was 22 mg/dl. repeat accu check in the Er after treatment was 57. (29 Dec 2017 03:50). CT here benign clinically insignificant liver cysts. She is c/o chronic lower abdominal pain. Poor historian. Pt. with chronically elevated transminases and Ct shows mild hepatomegaly with probable fatty infiltration unchanged from previous CT scans      REVIEW OF SYSTEMS:  Constitutional: No fever, weight loss or fatigue  ENMT:  No difficulty hearing, tinnitus, vertigo; No sinus or throat pain  Respiratory: No cough, wheezing, chills or hemoptysis  Cardiovascular: No chest pain, palpitations, dizziness or leg swelling  Gastrointestinal: As per HPI>. No nausea, vomiting or hematemesis; No diarrhea or constipation. No melena or hematochezia.  Skin: No itching, burning, rashes or lesions   Musculoskeletal: No joint pain or swelling; No muscle, back or extremity pain  Patient has no cardiopulmonary, peripheral vascular, musculoskeletal, dermatological, neurological, gynecological or psychological symptoms or complaints at this time    PAST MEDICAL & SURGICAL HISTORY:  Anemia  Sleep apnea  Myocardial infarction  GERD (gastroesophageal reflux disease)  Dyslipidemia  Hypertension  Diabetes mellitus  H/O right knee surgery: 8/20  S/P cataract extraction: B/L  S/P partial thyroidectomy  S/P tubal ligation: s/p cyst removal of left axilla  S/P cholecystectomy      FAMILY HISTORY:  No pertinent family history in first degree relatives      SOCIAL HISTORY:  Smoking Status: [ ] Current, [ ] Former, [ x] Never  Pack Years: N/A. No ETOH or drug abuse history    MEDICATIONS:  MEDICATIONS  (STANDING):  ascorbic acid 500 milliGRAM(s) Oral daily  aspirin enteric coated 81 milliGRAM(s) Oral daily  dextrose 5% + sodium chloride 0.9%. 1000 milliLiter(s) (150 mL/Hr) IV Continuous <Continuous>  dextrose 5%. 1000 milliLiter(s) (50 mL/Hr) IV Continuous <Continuous>  dextrose 50% Injectable 12.5 Gram(s) IV Push once  dextrose 50% Injectable 25 Gram(s) IV Push once  dextrose 50% Injectable 25 Gram(s) IV Push once  enoxaparin Injectable 40 milliGRAM(s) SubCutaneous daily  ferrous    sulfate 325 milliGRAM(s) Oral daily  insulin lispro (HumaLOG) corrective regimen sliding scale   SubCutaneous three times a day before meals  losartan 100 milliGRAM(s) Oral daily  magnesium oxide 400 milliGRAM(s) Oral three times a day with meals  pantoprazole    Tablet 40 milliGRAM(s) Oral before breakfast    MEDICATIONS  (PRN):  dextrose Gel 1 Dose(s) Oral once PRN Blood Glucose LESS THAN 70 milliGRAM(s)/deciliter  glucagon  Injectable 1 milliGRAM(s) IntraMuscular once PRN Glucose LESS THAN 70 milligrams/deciliter  traMADol 50 milliGRAM(s) Oral two times a day PRN Moderate Pain (4 - 6)      Allergies    No Known Allergies    Intolerances        Vital Signs Last 24 Hrs  T(C): 37.3 (29 Dec 2017 17:43), Max: 37.6 (29 Dec 2017 10:44)  T(F): 99.2 (29 Dec 2017 17:43), Max: 99.6 (29 Dec 2017 10:44)  HR: 105 (29 Dec 2017 17:43) (65 - 105)  BP: 117/78 (29 Dec 2017 17:43) (97/63 - 145/68)  BP(mean): --  RR: 20 (29 Dec 2017 17:43) (16 - 20)  SpO2: 100% (29 Dec 2017 10:44) (97% - 100%)    12-29 @ 07:01  -  12-29 @ 18:27  --------------------------------------------------------  IN: 1800 mL / OUT: 0 mL / NET: 1800 mL          PHYSICAL EXAM:    General: Well developed; overweight in no acute distress  HEENT: MMM, conjunctiva pink and sclera anicteric.  Gastrointestinal: Soft, non-tender non-distended; Normal bowel sounds; No rebound or guarding or HSM.  JOSÉ MIGUEL: Unable to perform where pt. located in ED.  Extremities: Normal range of motion, No clubbing, cyanosis or edema  Neurological: Alert and oriented x3  Skin: Warm and dry. No obvious rash      LABS:                        8.5    2.6   )-----------( 127      ( 29 Dec 2017 06:39 )             25.2     12-29    138  |  104  |  18.0  ----------------------------<  230<H>  4.0   |  21.0<L>  |  0.88    Ca    8.0<L>      29 Dec 2017 06:39  Mg     1.4     12-29    TPro  5.5<L>  /  Alb  3.0<L>  /  TBili  0.2<L>  /  DBili  x   /  AST  90<H>  /  ALT  46<H>  /  AlkPhos  153<H>  12-29          RADIOLOGY & ADDITIONAL STUDIES:   Ct results noted.

## 2017-12-29 NOTE — PROGRESS NOTE ADULT - PROBLEM SELECTOR PLAN 1
Stable w/ normal sugars now - monitor blood glucose level for now, w/ accuchecks AC/HS w/ RISS  Unknown etiology - Will reattempt contact w/ daughter, PMD & [facility] [address] to reconcile if any medications are actually being taken  A1c pending Stable w/ normal sugars now - monitor blood glucose level for now, w/ accuchecks AC/HS w/ RISS  Unknown etiology - Will reattempt contact w/ daughter, PMD & [facility] [address] to reconcile if any medications are actually being taken  A1c pending. Most likely secondary to oral hypoglycemics. Stable w/ normal sugars now - monitor blood glucose level for now, w/ accuchecks AC/HS w/ RISS  Unknown etiology - Will reattempt contact w/ daughter, PMD & [facility] [address] to reconcile if any medications are actually being taken  A1c pending.  Possibly secondary to oral hypoglycemics.

## 2017-12-30 LAB
ANION GAP SERPL CALC-SCNC: 10 MMOL/L — SIGNIFICANT CHANGE UP (ref 5–17)
BUN SERPL-MCNC: 10 MG/DL — SIGNIFICANT CHANGE UP (ref 8–20)
C DIFF BY PCR RESULT: SIGNIFICANT CHANGE UP
C DIFF TOX GENS STL QL NAA+PROBE: SIGNIFICANT CHANGE UP
CALCIUM SERPL-MCNC: 8.1 MG/DL — LOW (ref 8.6–10.2)
CHLORIDE SERPL-SCNC: 104 MMOL/L — SIGNIFICANT CHANGE UP (ref 98–107)
CO2 SERPL-SCNC: 23 MMOL/L — SIGNIFICANT CHANGE UP (ref 22–29)
CREAT SERPL-MCNC: 0.75 MG/DL — SIGNIFICANT CHANGE UP (ref 0.5–1.3)
GLUCOSE BLDC GLUCOMTR-MCNC: 100 MG/DL — HIGH (ref 70–99)
GLUCOSE BLDC GLUCOMTR-MCNC: 134 MG/DL — HIGH (ref 70–99)
GLUCOSE BLDC GLUCOMTR-MCNC: 146 MG/DL — HIGH (ref 70–99)
GLUCOSE BLDC GLUCOMTR-MCNC: 224 MG/DL — HIGH (ref 70–99)
GLUCOSE BLDC GLUCOMTR-MCNC: 68 MG/DL — LOW (ref 70–99)
GLUCOSE SERPL-MCNC: 172 MG/DL — HIGH (ref 70–115)
HBA1C BLD-MCNC: 5.6 % — SIGNIFICANT CHANGE UP (ref 4–5.6)
HCT VFR BLD CALC: 26.1 % — LOW (ref 37–47)
HGB BLD-MCNC: 8.3 G/DL — LOW (ref 12–16)
MAGNESIUM SERPL-MCNC: 1.1 MG/DL — LOW (ref 1.6–2.6)
MCHC RBC-ENTMCNC: 26.4 PG — LOW (ref 27–31)
MCHC RBC-ENTMCNC: 31.8 G/DL — LOW (ref 32–36)
MCV RBC AUTO: 83.1 FL — SIGNIFICANT CHANGE UP (ref 81–99)
OB PNL STL: NEGATIVE — SIGNIFICANT CHANGE UP
PHOSPHATE SERPL-MCNC: 2.1 MG/DL — LOW (ref 2.4–4.7)
PLATELET # BLD AUTO: 146 K/UL — LOW (ref 150–400)
POTASSIUM SERPL-MCNC: 4.6 MMOL/L — SIGNIFICANT CHANGE UP (ref 3.5–5.3)
POTASSIUM SERPL-SCNC: 4.6 MMOL/L — SIGNIFICANT CHANGE UP (ref 3.5–5.3)
RBC # BLD: 3.14 M/UL — LOW (ref 4.4–5.2)
RBC # FLD: 14.8 % — SIGNIFICANT CHANGE UP (ref 11–15.6)
SODIUM SERPL-SCNC: 137 MMOL/L — SIGNIFICANT CHANGE UP (ref 135–145)
WBC # BLD: 2.7 K/UL — LOW (ref 4.8–10.8)
WBC # FLD AUTO: 2.7 K/UL — LOW (ref 4.8–10.8)

## 2017-12-30 PROCEDURE — 99233 SBSQ HOSP IP/OBS HIGH 50: CPT

## 2017-12-30 RX ORDER — LIRAGLUTIDE 6 MG/ML
0 INJECTION SUBCUTANEOUS
Qty: 0 | Refills: 0 | COMMUNITY

## 2017-12-30 RX ORDER — METFORMIN HYDROCHLORIDE 850 MG/1
1 TABLET ORAL
Qty: 0 | Refills: 0 | COMMUNITY

## 2017-12-30 RX ORDER — MONTELUKAST 4 MG/1
10 TABLET, CHEWABLE ORAL DAILY
Qty: 0 | Refills: 0 | Status: DISCONTINUED | OUTPATIENT
Start: 2017-12-30 | End: 2018-01-05

## 2017-12-30 RX ORDER — REPAGLINIDE 1 MG/1
1 TABLET ORAL
Qty: 0 | Refills: 0 | COMMUNITY

## 2017-12-30 RX ORDER — MAGNESIUM SULFATE 500 MG/ML
2 VIAL (ML) INJECTION ONCE
Qty: 0 | Refills: 0 | Status: COMPLETED | OUTPATIENT
Start: 2017-12-30 | End: 2017-12-30

## 2017-12-30 RX ORDER — REPAGLINIDE 1 MG/1
0.5 TABLET ORAL
Qty: 0 | Refills: 0 | Status: DISCONTINUED | OUTPATIENT
Start: 2017-12-30 | End: 2018-01-02

## 2017-12-30 RX ORDER — INFLUENZA VIRUS VACCINE 15; 15; 15; 15 UG/.5ML; UG/.5ML; UG/.5ML; UG/.5ML
0.5 SUSPENSION INTRAMUSCULAR ONCE
Qty: 0 | Refills: 0 | Status: COMPLETED | OUTPATIENT
Start: 2017-12-30 | End: 2017-12-30

## 2017-12-30 RX ADMIN — Medication 50 GRAM(S): at 13:30

## 2017-12-30 RX ADMIN — LOSARTAN POTASSIUM 100 MILLIGRAM(S): 100 TABLET, FILM COATED ORAL at 05:21

## 2017-12-30 RX ADMIN — MAGNESIUM OXIDE 400 MG ORAL TABLET 400 MILLIGRAM(S): 241.3 TABLET ORAL at 08:33

## 2017-12-30 RX ADMIN — MAGNESIUM OXIDE 400 MG ORAL TABLET 400 MILLIGRAM(S): 241.3 TABLET ORAL at 13:31

## 2017-12-30 RX ADMIN — Medication 500 MILLIGRAM(S): at 11:00

## 2017-12-30 RX ADMIN — Medication 81 MILLIGRAM(S): at 11:00

## 2017-12-30 RX ADMIN — TRAMADOL HYDROCHLORIDE 50 MILLIGRAM(S): 50 TABLET ORAL at 22:57

## 2017-12-30 RX ADMIN — REPAGLINIDE 0.5 MILLIGRAM(S): 1 TABLET ORAL at 16:48

## 2017-12-30 RX ADMIN — ENOXAPARIN SODIUM 40 MILLIGRAM(S): 100 INJECTION SUBCUTANEOUS at 11:00

## 2017-12-30 RX ADMIN — MAGNESIUM OXIDE 400 MG ORAL TABLET 400 MILLIGRAM(S): 241.3 TABLET ORAL at 16:49

## 2017-12-30 RX ADMIN — Medication 325 MILLIGRAM(S): at 11:00

## 2017-12-30 RX ADMIN — PANTOPRAZOLE SODIUM 40 MILLIGRAM(S): 20 TABLET, DELAYED RELEASE ORAL at 05:21

## 2017-12-30 RX ADMIN — TRAMADOL HYDROCHLORIDE 50 MILLIGRAM(S): 50 TABLET ORAL at 23:33

## 2017-12-30 RX ADMIN — Medication 2: at 10:52

## 2017-12-30 NOTE — PROGRESS NOTE ADULT - ASSESSMENT
68 y/o female with hypoglycemia, hypomagnesemia, HTN, DM II, chronic elevation of transaminases and c/o RLQ abdominal pain, N/V/F with constipation, admitted for hypoglycemia, symptomatic, now resolved.  No new complaints.    ADDENDUM TO AM NOTE:  Pt states that she has not taken any diabetes medication in the past month ever since last episode of hypoglycemia 1 month ago.  Still awaiting call back from family member (daughter).  Per [facility] Pt filled:  In June- Tujeo 33u qhs  In Nov- Victoza 1.2 mg qd, Farxiga 5mg qd, Metformin ER 2g qd  On 12/30 she has no further episodes of hypoglycemia. I will need to review the meds with her and her daughter prior to discharge.

## 2017-12-30 NOTE — PROGRESS NOTE ADULT - PROBLEM SELECTOR PLAN 1
Stable w/ normal sugars now - monitor blood glucose level for now, w/ accuchecks AC/HS w/ RISS  Unknown etiology - Will reattempt contact w/ daughter, PMD & [facility] [address] to reconcile if any medications are actually being taken  A1c pending.  Possibly secondary to oral hypoglycemics.

## 2017-12-30 NOTE — PROGRESS NOTE ADULT - SUBJECTIVE AND OBJECTIVE BOX
SARA CLOUD     Chief Complaint: Patient is a 67y old  Female who presents with a chief complaint of hypoglycemia (29 Dec 2017 03:50)      PAST MEDICAL & SURGICAL HISTORY:  Anemia  Sleep apnea  Myocardial infarction  GERD (gastroesophageal reflux disease)  Dyslipidemia  Hypertension  Diabetes mellitus  H/O right knee surgery:   S/P cataract extraction: B/L  S/P partial thyroidectomy  S/P tubal ligation: s/p cyst removal of left axilla  S/P cholecystectomy      HPI/OVERNIGHT EVENTS: Patient has diarrhea, and low magnesium. No further hypoglycemia.    MEDICATIONS  (STANDING):  ascorbic acid 500 milliGRAM(s) Oral daily  aspirin enteric coated 81 milliGRAM(s) Oral daily  dextrose 5%. 1000 milliLiter(s) (50 mL/Hr) IV Continuous <Continuous>  dextrose 50% Injectable 12.5 Gram(s) IV Push once  dextrose 50% Injectable 25 Gram(s) IV Push once  dextrose 50% Injectable 25 Gram(s) IV Push once  enoxaparin Injectable 40 milliGRAM(s) SubCutaneous daily  ferrous    sulfate 325 milliGRAM(s) Oral daily  influenza   Vaccine 0.5 milliLiter(s) IntraMuscular once  insulin lispro (HumaLOG) corrective regimen sliding scale   SubCutaneous three times a day before meals  losartan 100 milliGRAM(s) Oral daily  magnesium oxide 400 milliGRAM(s) Oral three times a day with meals  magnesium sulfate  IVPB 2 Gram(s) IV Intermittent once  pantoprazole    Tablet 40 milliGRAM(s) Oral before breakfast  repaglinide 0.5 milliGRAM(s) Oral three times a day before meals      Vital Signs Last 24 Hrs  T(C): 37.4 (30 Dec 2017 09:01), Max: 37.8 (29 Dec 2017 21:53)  T(F): 99.4 (30 Dec 2017 09:01), Max: 100 (29 Dec 2017 21:53)  HR: 95 (30 Dec 2017 09:01) (82 - 105)  BP: 127/75 (30 Dec 2017 09:01) (103/60 - 133/96)  BP(mean): --  RR: 17 (30 Dec 2017 09:01) (16 - 20)  SpO2: 96% (30 Dec 2017 09:01) (96% - 100%)    PHYSICAL EXAM:  Constitutional: NAD, well-groomed, well-developed  HEENT: PERRLA, EOMI, Normal Hearing, MMM  Neck: No LAD, No JVD  Back: Normal spine flexure, No CVA tenderness  Respiratory: CTAB Cardiovascular: S1 and S2, RRR, no M/G/R  Gastrointestinal: BS+, soft, NT/ND  Extremities: No peripheral edema  Vascular: 2+ peripheral pulses  Neurological: A/O x 3, no focal deficits  Psychiatric: Normal mood, normal affect  Musculoskeletal: 5/5 strength b/l upper and lower extremities  Skin: No rashes    CAPILLARY BLOOD GLUCOSE    LABS:                        8.3    2.7   )-----------( 146      ( 30 Dec 2017 11:04 )             26.1     12-    137  |  104  |  10.0  ----------------------------<  172<H>  4.6   |  23.0  |  0.75    Ca    8.1<L>      30 Dec 2017 11:04  Phos  2.1     12-30  Mg     1.1     12    TPro  5.5<L>  /  Alb  3.0<L>  /  TBili  0.2<L>  /  DBili  x   /  AST  90<H>  /  ALT  46<H>  /  AlkPhos  153<H>  12-    PT/INR - ( 29 Dec 2017 18:25 )   PT: 11.8 sec;   INR: 1.07 ratio         PTT - ( 29 Dec 2017 18:25 )  PTT:38.1 sec  Urinalysis Basic - ( 29 Dec 2017 05:18 )    Color: Yellow / Appearance: Clear / S.010 / pH: x  Gluc: x / Ketone: Negative  / Bili: Negative / Urobili: Negative mg/dL   Blood: x / Protein: Negative mg/dL / Nitrite: Negative   Leuk Esterase: Small / RBC: 0-2 /HPF / WBC 11-25   Sq Epi: x / Non Sq Epi: Few / Bacteria: Occasional        RADIOLOGY & ADDITIONAL TESTS:

## 2017-12-31 ENCOUNTER — TRANSCRIPTION ENCOUNTER (OUTPATIENT)
Age: 67
End: 2017-12-31

## 2017-12-31 DIAGNOSIS — N30.00 ACUTE CYSTITIS WITHOUT HEMATURIA: ICD-10-CM

## 2017-12-31 DIAGNOSIS — D64.9 ANEMIA, UNSPECIFIED: ICD-10-CM

## 2017-12-31 LAB
-  AMIKACIN: SIGNIFICANT CHANGE UP
-  AMPICILLIN/SULBACTAM: SIGNIFICANT CHANGE UP
-  AMPICILLIN: SIGNIFICANT CHANGE UP
-  AZTREONAM: SIGNIFICANT CHANGE UP
-  CEFAZOLIN: SIGNIFICANT CHANGE UP
-  CEFEPIME: SIGNIFICANT CHANGE UP
-  CEFOXITIN: SIGNIFICANT CHANGE UP
-  CEFTAZIDIME: SIGNIFICANT CHANGE UP
-  CEFTRIAXONE: SIGNIFICANT CHANGE UP
-  CIPROFLOXACIN: SIGNIFICANT CHANGE UP
-  ERTAPENEM: SIGNIFICANT CHANGE UP
-  GENTAMICIN: SIGNIFICANT CHANGE UP
-  IMIPENEM: SIGNIFICANT CHANGE UP
-  LEVOFLOXACIN: SIGNIFICANT CHANGE UP
-  MEROPENEM: SIGNIFICANT CHANGE UP
-  NITROFURANTOIN: SIGNIFICANT CHANGE UP
-  PIPERACILLIN/TAZOBACTAM: SIGNIFICANT CHANGE UP
-  TOBRAMYCIN: SIGNIFICANT CHANGE UP
-  TRIMETHOPRIM/SULFAMETHOXAZOLE: SIGNIFICANT CHANGE UP
ANION GAP SERPL CALC-SCNC: 12 MMOL/L — SIGNIFICANT CHANGE UP (ref 5–17)
BUN SERPL-MCNC: 8 MG/DL — SIGNIFICANT CHANGE UP (ref 8–20)
CALCIUM SERPL-MCNC: 8.4 MG/DL — LOW (ref 8.6–10.2)
CHLORIDE SERPL-SCNC: 101 MMOL/L — SIGNIFICANT CHANGE UP (ref 98–107)
CO2 SERPL-SCNC: 21 MMOL/L — LOW (ref 22–29)
CREAT SERPL-MCNC: 0.63 MG/DL — SIGNIFICANT CHANGE UP (ref 0.5–1.3)
CULTURE RESULTS: SIGNIFICANT CHANGE UP
GLUCOSE BLDC GLUCOMTR-MCNC: 100 MG/DL — HIGH (ref 70–99)
GLUCOSE BLDC GLUCOMTR-MCNC: 122 MG/DL — HIGH (ref 70–99)
GLUCOSE BLDC GLUCOMTR-MCNC: 132 MG/DL — HIGH (ref 70–99)
GLUCOSE BLDC GLUCOMTR-MCNC: 139 MG/DL — HIGH (ref 70–99)
GLUCOSE BLDC GLUCOMTR-MCNC: 144 MG/DL — HIGH (ref 70–99)
GLUCOSE BLDC GLUCOMTR-MCNC: 74 MG/DL — SIGNIFICANT CHANGE UP (ref 70–99)
GLUCOSE BLDC GLUCOMTR-MCNC: 97 MG/DL — SIGNIFICANT CHANGE UP (ref 70–99)
GLUCOSE SERPL-MCNC: 129 MG/DL — HIGH (ref 70–115)
HAV IGM SER-ACNC: SIGNIFICANT CHANGE UP
HBV CORE IGM SER-ACNC: SIGNIFICANT CHANGE UP
HBV SURFACE AG SER-ACNC: SIGNIFICANT CHANGE UP
HCT VFR BLD CALC: 25 % — LOW (ref 37–47)
HCV AB S/CO SERPL IA: 0.06 S/CO — SIGNIFICANT CHANGE UP
HCV AB SERPL-IMP: SIGNIFICANT CHANGE UP
HGB BLD-MCNC: 8 G/DL — LOW (ref 12–16)
MAGNESIUM SERPL-MCNC: 1 MG/DL — CRITICAL LOW (ref 1.6–2.6)
MCHC RBC-ENTMCNC: 26.6 PG — LOW (ref 27–31)
MCHC RBC-ENTMCNC: 32 G/DL — SIGNIFICANT CHANGE UP (ref 32–36)
MCV RBC AUTO: 83.1 FL — SIGNIFICANT CHANGE UP (ref 81–99)
METHOD TYPE: SIGNIFICANT CHANGE UP
ORGANISM # SPEC MICROSCOPIC CNT: SIGNIFICANT CHANGE UP
ORGANISM # SPEC MICROSCOPIC CNT: SIGNIFICANT CHANGE UP
PHOSPHATE SERPL-MCNC: 2.2 MG/DL — LOW (ref 2.4–4.7)
PLATELET # BLD AUTO: 137 K/UL — LOW (ref 150–400)
POTASSIUM SERPL-MCNC: 3.8 MMOL/L — SIGNIFICANT CHANGE UP (ref 3.5–5.3)
POTASSIUM SERPL-SCNC: 3.8 MMOL/L — SIGNIFICANT CHANGE UP (ref 3.5–5.3)
RBC # BLD: 3.01 M/UL — LOW (ref 4.4–5.2)
RBC # FLD: 14.5 % — SIGNIFICANT CHANGE UP (ref 11–15.6)
SODIUM SERPL-SCNC: 134 MMOL/L — LOW (ref 135–145)
SPECIMEN SOURCE: SIGNIFICANT CHANGE UP
WBC # BLD: 2.9 K/UL — LOW (ref 4.8–10.8)
WBC # FLD AUTO: 2.9 K/UL — LOW (ref 4.8–10.8)

## 2017-12-31 PROCEDURE — 99233 SBSQ HOSP IP/OBS HIGH 50: CPT

## 2017-12-31 RX ORDER — REPAGLINIDE 1 MG/1
1 TABLET ORAL
Qty: 0 | Refills: 0 | COMMUNITY

## 2017-12-31 RX ORDER — ONDANSETRON 8 MG/1
4 TABLET, FILM COATED ORAL EVERY 4 HOURS
Qty: 0 | Refills: 0 | Status: COMPLETED | OUTPATIENT
Start: 2017-12-31 | End: 2017-12-31

## 2017-12-31 RX ORDER — ASCORBIC ACID 60 MG
1 TABLET,CHEWABLE ORAL
Qty: 0 | Refills: 0 | COMMUNITY
Start: 2017-12-31

## 2017-12-31 RX ORDER — METFORMIN HYDROCHLORIDE 850 MG/1
4 TABLET ORAL
Qty: 0 | Refills: 0 | COMMUNITY

## 2017-12-31 RX ORDER — DAPAGLIFLOZIN 10 MG/1
1 TABLET, FILM COATED ORAL
Qty: 0 | Refills: 0 | COMMUNITY

## 2017-12-31 RX ORDER — BACITRACIN ZINC 500 UNIT/G
1 OINTMENT IN PACKET (EA) TOPICAL
Qty: 0 | Refills: 0 | COMMUNITY

## 2017-12-31 RX ORDER — REPAGLINIDE 1 MG/1
1 TABLET ORAL
Qty: 90 | Refills: 0 | OUTPATIENT
Start: 2017-12-31 | End: 2018-01-29

## 2017-12-31 RX ORDER — MELOXICAM 15 MG/1
1 TABLET ORAL
Qty: 0 | Refills: 0 | COMMUNITY

## 2017-12-31 RX ORDER — MAGNESIUM SULFATE 500 MG/ML
2 VIAL (ML) INJECTION EVERY 6 HOURS
Qty: 0 | Refills: 0 | Status: COMPLETED | OUTPATIENT
Start: 2017-12-31 | End: 2017-12-31

## 2017-12-31 RX ORDER — LIRAGLUTIDE 6 MG/ML
1.2 INJECTION SUBCUTANEOUS
Qty: 0 | Refills: 0 | COMMUNITY

## 2017-12-31 RX ORDER — SODIUM CHLORIDE 9 MG/ML
1000 INJECTION, SOLUTION INTRAVENOUS ONCE
Qty: 0 | Refills: 0 | Status: COMPLETED | OUTPATIENT
Start: 2017-12-31 | End: 2017-12-31

## 2017-12-31 RX ORDER — FERROUS SULFATE 325(65) MG
1 TABLET ORAL
Qty: 0 | Refills: 0 | COMMUNITY
Start: 2017-12-31

## 2017-12-31 RX ADMIN — Medication 325 MILLIGRAM(S): at 12:13

## 2017-12-31 RX ADMIN — PANTOPRAZOLE SODIUM 40 MILLIGRAM(S): 20 TABLET, DELAYED RELEASE ORAL at 05:10

## 2017-12-31 RX ADMIN — Medication 50 GRAM(S): at 12:12

## 2017-12-31 RX ADMIN — ONDANSETRON 4 MILLIGRAM(S): 8 TABLET, FILM COATED ORAL at 21:26

## 2017-12-31 RX ADMIN — TRAMADOL HYDROCHLORIDE 50 MILLIGRAM(S): 50 TABLET ORAL at 08:54

## 2017-12-31 RX ADMIN — REPAGLINIDE 0.5 MILLIGRAM(S): 1 TABLET ORAL at 17:42

## 2017-12-31 RX ADMIN — MONTELUKAST 10 MILLIGRAM(S): 4 TABLET, CHEWABLE ORAL at 17:41

## 2017-12-31 RX ADMIN — ENOXAPARIN SODIUM 40 MILLIGRAM(S): 100 INJECTION SUBCUTANEOUS at 12:14

## 2017-12-31 RX ADMIN — MAGNESIUM OXIDE 400 MG ORAL TABLET 400 MILLIGRAM(S): 241.3 TABLET ORAL at 08:54

## 2017-12-31 RX ADMIN — Medication 50 GRAM(S): at 17:41

## 2017-12-31 RX ADMIN — REPAGLINIDE 0.5 MILLIGRAM(S): 1 TABLET ORAL at 12:13

## 2017-12-31 RX ADMIN — REPAGLINIDE 0.5 MILLIGRAM(S): 1 TABLET ORAL at 05:11

## 2017-12-31 RX ADMIN — Medication 81 MILLIGRAM(S): at 12:13

## 2017-12-31 RX ADMIN — TRAMADOL HYDROCHLORIDE 50 MILLIGRAM(S): 50 TABLET ORAL at 09:34

## 2017-12-31 RX ADMIN — Medication 500 MILLIGRAM(S): at 12:12

## 2017-12-31 RX ADMIN — LOSARTAN POTASSIUM 100 MILLIGRAM(S): 100 TABLET, FILM COATED ORAL at 05:08

## 2017-12-31 RX ADMIN — SODIUM CHLORIDE 200 MILLILITER(S): 9 INJECTION, SOLUTION INTRAVENOUS at 12:40

## 2017-12-31 NOTE — DISCHARGE NOTE ADULT - MEDICATION SUMMARY - MEDICATIONS TO STOP TAKING
I will STOP taking the medications listed below when I get home from the hospital:    metFORMIN 500 mg oral tablet, extended release  -- 1 tab(s) by mouth once a day  HOLD FOR 2 days    Victoza 18 mg/3 mL subcutaneous solution    metFORMIN 500 mg oral tablet, extended release  -- 4 tab(s) by mouth once a day    Victoza 18 mg/3 mL subcutaneous solution  -- 1.2 milligram(s) subcutaneous once a day    Farxiga 5 mg oral tablet  -- 1 tab(s) by mouth once a day    cefadroxil 500 mg oral capsule  -- 1 cap(s) by mouth every 12 hours    meloxicam 15 mg oral tablet  -- 1 tab(s) by mouth once a day    Singulair 10 mg oral tablet  -- 1 tab(s) by mouth once a day

## 2017-12-31 NOTE — DISCHARGE NOTE ADULT - SECONDARY DIAGNOSIS.
Abnormal CT scan, liver Acute cystitis without hematuria Anemia, unspecified type Dyslipidemia Elevated transaminase level Essential hypertension

## 2017-12-31 NOTE — DISCHARGE NOTE ADULT - PLAN OF CARE
Avoid hypoglycemia Stop all Diabetes medications except for prandin 0.5 mg pre-meals and follow up with Dr Yen. Follow up with GI Prevent infection Follow up with primary care physician Stable blood count Visit Hematology such as Dr Stewart LDL less than 70 Monitor lipid profile Monitor BP

## 2017-12-31 NOTE — DISCHARGE NOTE ADULT - MEDICATION SUMMARY - MEDICATIONS TO CHANGE
I will SWITCH the dose or number of times a day I take the medications listed below when I get home from the hospital:    repaglinide 2 mg oral tablet  -- 1 tab(s) by mouth 3 times a day (before meals)    repaglinide 2 mg oral tablet  -- 1 tab(s) by mouth 3 times a day (before meals)    bacitracin topical  -- Apply on skin to affected area prn

## 2017-12-31 NOTE — DISCHARGE NOTE ADULT - PATIENT PORTAL LINK FT
“You can access the [facility] Patient Portal, offered by [facility], by registering with the following website: [email or web address]”

## 2017-12-31 NOTE — DISCHARGE NOTE ADULT - MEDICATION SUMMARY - MEDICATIONS TO TAKE
I will START or STAY ON the medications listed below when I get home from the hospital:    Aspir 81 oral delayed release tablet  -- 1 tab(s) by mouth once a day  -- Indication: For Preventive measure    traMADol 50 mg oral tablet  -- orally 2 times a day  -- Indication: For Pain    repaglinide 0.5 mg oral tablet  -- 1 tab(s) by mouth 3 times a day (before meals)  -- Indication: For Diabetes    losartan-hydrochlorothiazide 100mg-12.5mg oral tablet  -- 1 tab(s) by mouth once a day  -- Indication: For HTN    FeroSul 325 mg (65 mg elemental iron) oral tablet  -- 1 tab(s) by mouth once a day  -- Indication: For Anemia, unspecified type    Singulair 10 mg oral tablet  -- 1 tab(s) by mouth once a day  -- Indication: For Asthma    omeprazole 20 mg oral delayed release tablet  -- 1 tab(s) by mouth once a day  -- Indication: For GERD    ascorbic acid 500 mg oral tablet  -- 1 tab(s) by mouth once a day  -- Indication: For Supplement I will START or STAY ON the medications listed below when I get home from the hospital:    Aspir 81 oral delayed release tablet  -- 1 tab(s) by mouth once a day  -- Indication: For Preventive measure    traMADol 50 mg oral tablet  -- orally 2 times a day  -- Indication: For Pain    losartan-hydrochlorothiazide 100mg-12.5mg oral tablet  -- 1 tab(s) by mouth once a day  -- Indication: For HTN    FeroSul 325 mg (65 mg elemental iron) oral tablet  -- 1 tab(s) by mouth once a day  -- Indication: For Anemia, unspecified type    Singulair 10 mg oral tablet  -- 1 tab(s) by mouth once a day  -- Indication: For Asthma    omeprazole 20 mg oral delayed release tablet  -- 1 tab(s) by mouth once a day  -- Indication: For GERD    ascorbic acid 500 mg oral tablet  -- 1 tab(s) by mouth once a day  -- Indication: For Supplement I will START or STAY ON the medications listed below when I get home from the hospital:    Aspir 81 oral delayed release tablet  -- 1 tab(s) by mouth once a day  -- Indication: For Preventive measure    traMADol 50 mg oral tablet  -- orally 2 times a day  -- Indication: For Pain    Lexapro 10 mg oral tablet  -- 1 tab(s) by mouth once a day  -- Indication: For Depression    losartan-hydrochlorothiazide 100mg-12.5mg oral tablet  -- 1 tab(s) by mouth once a day  -- Indication: For HTN    FeroSul 325 mg (65 mg elemental iron) oral tablet  -- 1 tab(s) by mouth once a day  -- Indication: For Anemia, unspecified type    Singulair 10 mg oral tablet  -- 1 tab(s) by mouth once a day  -- Indication: For Asthma    omeprazole 20 mg oral delayed release tablet  -- 1 tab(s) by mouth once a day  -- Indication: For GERD    ascorbic acid 500 mg oral tablet  -- 1 tab(s) by mouth once a day  -- Indication: For Supplement

## 2017-12-31 NOTE — PROGRESS NOTE ADULT - PROBLEM SELECTOR PLAN 1
Stable w/ normal sugars now - monitor blood glucose level for now, w/ accuchecks AC/HS w/ RISS, Unknown etiology - Will reattempt contact w/ daughter, PMD & [facility] [address] to reconcile if any medications are actually being taken, A1c pending. Possibly secondary to oral hypoglycemics.  On 12/31 she has no further hypoglycemia on Prandin monotherapy.

## 2017-12-31 NOTE — PROGRESS NOTE ADULT - SUBJECTIVE AND OBJECTIVE BOX
SARA CLOUD     Chief Complaint: Patient is a 67y old  Female who presents with a chief complaint of hypoglycemia (29 Dec 2017 03:50)      PAST MEDICAL & SURGICAL HISTORY:  Anemia  Sleep apnea  Myocardial infarction  GERD (gastroesophageal reflux disease)  Dyslipidemia  Hypertension  Diabetes mellitus  H/O right knee surgery: 8/20  S/P cataract extraction: B/L  S/P partial thyroidectomy  S/P tubal ligation: s/p cyst removal of left axilla  S/P cholecystectomy      HPI/OVERNIGHT EVENTS: Patient with an episode of vomiting. No further hypoglycemia. Hypomagnesemic, and urine culture 10-49 K ESBL.    MEDICATIONS  (STANDING):  ascorbic acid 500 milliGRAM(s) Oral daily  aspirin enteric coated 81 milliGRAM(s) Oral daily  dextrose 5%. 1000 milliLiter(s) (50 mL/Hr) IV Continuous <Continuous>  dextrose 50% Injectable 12.5 Gram(s) IV Push once  dextrose 50% Injectable 25 Gram(s) IV Push once  dextrose 50% Injectable 25 Gram(s) IV Push once  enoxaparin Injectable 40 milliGRAM(s) SubCutaneous daily  ferrous    sulfate 325 milliGRAM(s) Oral daily  influenza   Vaccine 0.5 milliLiter(s) IntraMuscular once  insulin lispro (HumaLOG) corrective regimen sliding scale   SubCutaneous three times a day before meals  losartan 100 milliGRAM(s) Oral daily  magnesium sulfate  IVPB 2 Gram(s) IV Intermittent every 6 hours  montelukast 10 milliGRAM(s) Oral daily  pantoprazole    Tablet 40 milliGRAM(s) Oral before breakfast  repaglinide 0.5 milliGRAM(s) Oral three times a day before meals      Vital Signs Last 24 Hrs  T(C): 37.9 (31 Dec 2017 08:45), Max: 38.4 (31 Dec 2017 00:49)  T(F): 100.3 (31 Dec 2017 08:45), Max: 101.1 (31 Dec 2017 00:49)  HR: 87 (31 Dec 2017 08:45) (77 - 100)  BP: 113/67 (31 Dec 2017 08:45) (113/67 - 143/74)  BP(mean): --  RR: 18 (31 Dec 2017 08:45) (14 - 18)  SpO2: 98% (31 Dec 2017 08:45) (98% - 99%)    PHYSICAL EXAM:  Constitutional: NAD, well-groomed, well-developed  HEENT: PERRLA, EOMI, Normal Hearing, MMM  Neck: No LAD, No JVD  Back: Normal spine flexure, No CVA tenderness  Respiratory: CTAB Cardiovascular: S1 and S2, RRR, no M/G/R  Gastrointestinal: BS+, soft, NT/ND  Extremities: No peripheral edema  Vascular: 2+ peripheral pulses  Neurological: A/O x 3, no focal deficits  Psychiatric: Normal mood, normal affect  Musculoskeletal: 5/5 strength b/l upper and lower extremities  Skin: No rashes    CAPILLARY BLOOD GLUCOSE    LABS:                        8.0    2.9   )-----------( 137      ( 31 Dec 2017 09:19 )             25.0     12-31    134<L>  |  101  |  8.0  ----------------------------<  129<H>  3.8   |  21.0<L>  |  0.63    Ca    8.4<L>      31 Dec 2017 09:19  Phos  2.2     12-31  Mg     1.0     12-31      PT/INR - ( 29 Dec 2017 18:25 )   PT: 11.8 sec;   INR: 1.07 ratio         PTT - ( 29 Dec 2017 18:25 )  PTT:38.1 sec      RADIOLOGY & ADDITIONAL TESTS:

## 2017-12-31 NOTE — PROGRESS NOTE ADULT - ASSESSMENT
66 y/o female with hypoglycemia, hypomagnesemia, HTN, DM II, chronic elevation of transaminases and c/o RLQ abdominal pain, N/V/F with constipation, admitted for hypoglycemia, symptomatic, now resolved.  No new complaints.  Pt states that she has not taken any diabetes medication in the past month ever since last episode of hypoglycemia 1 month ago.  Still awaiting call back from family member (daughter).  Per [facility] Pt filled:  In June- Tujeo 33u qhs. In Nov- Victoza 1.2 mg qd, Farxiga 5mg qd, Metformin ER 2g qd  On 12/30 she has no further episodes of hypoglycemia. I will need to review the meds with her and her daughter prior to discharge.  On 12/31 Patient had an episode of vomiting, with hypomagnesemia, and positive urine culture.

## 2017-12-31 NOTE — DISCHARGE NOTE ADULT - HOSPITAL COURSE
68 y/o female with hypoglycemia, hypomagnesemia, HTN, DM II, chronic elevation of transaminases and c/o RLQ abdominal pain, N/V/F with constipation, admitted for hypoglycemia, symptomatic, now resolved.  No new complaints.  Pt states that she has not taken any diabetes medication in the past month ever since last episode of hypoglycemia 1 month ago.  Still awaiting call back from family member (daughter).  Per [facility] Pt filled:  In June- Tujeo 33u qhs. In Nov- Victoza 1.2 mg qd, Farxiga 5mg qd, Metformin ER 2g qd  On 12/30 she has no further episodes of hypoglycemia. I will need to review the meds with her and her daughter prior to discharge.  On 12/31 Patient had an episode of vomiting, with hypomagnesemia, and positive urine culture.       Problem/Plan - 1:  ·  Problem: Hypoglycemia.  Plan: Stable w/ normal sugars now - monitor blood glucose level for now, w/ accuchecks AC/HS w/ RISS, Unknown etiology - Will reattempt contact w/ daughter, PMD & [facility] [address] to reconcile if any medications are actually being taken, A1c pending. Possibly secondary to oral hypoglycemics.  On 12/31 she has no further hypoglycemia on Prandin monotherapy.      Problem/Plan - 2:  ·  Problem: Hypomagnesemia.  Plan: Magnesium supplemented, repeat labs pending  Possibly secondary to decreased PO intake. On 12/31 we continued to aggressively replace magnesium.      Problem/Plan - 3:  ·  Problem: Essential hypertension.  Plan: Stable - continue Losartan.      Problem/Plan - 4:  ·  Problem: Type 2 diabetes mellitus with complication, without long-term current use of insulin.  Plan: insulin therapy protocol as above  Will consider insulin level.      Problem/Plan - 5:  ·  Problem: Elevated transaminase level.  Plan: chronically elevated but in the setting of abdominal pain and abnormal US previously, will check CT Abd w/ contrast.  Possibly may follow with GI as outpatient  Will reinforce lifestyle modifications and consider Nutrition consult.      Problem/Plan - 6:  Problem: Pancytopenia. Plan: Clinically stable but pt reports some constitutional sx and pt with known liver disease  No e/o malignancy, metastatic disease, or myelofibrosis; no recent travel  possibly 2/2 to an unspecified viral syndrome  -will review medication list as well as inquire daughter; about over-the-counter medications, health supplements, and home or folk remedies  -b12, folate level pending; retic ct, uric acid, PT/PTT pending.     Problem/Plan - 7:  ·  Problem: Preventive measure.  Plan: DVT ppx - lovenox subQ.      Problem/Plan - 8:  ·  Problem: Acute cystitis without hematuria.  Plan: No antibiotics due to low count.      Problem/Plan - 9:  ·  Problem: Anemia, unspecified type.  Plan: Follow up with Hematology as an outpatient.     Attending Attestation:   I was physically present for the key portions of the evaluation and management (E/M) service provided.  I agree with the above history, physical, and plan which I have reviewed and edited where appropriate.     45 minutes spent on total encounter; more than 50% of the visit was spent counseling and/or coordinating care by the attending physician. 68 y/o female with hypoglycemia, hypomagnesemia, HTN, DM II, chronic elevation of transaminases and c/o RLQ abdominal pain, N/V/F with constipation, admitted for hypoglycemia, symptomatic, now resolved.  No new complaints.  Pt states that she has not taken any diabetes medication in the past month ever since last episode of hypoglycemia 1 month ago.  Still awaiting call back from family member (daughter).  Per [facility] Pt filled:  In June- Tujeo 33u qhs. In Nov- Victoza 1.2 mg qd, Farxiga 5mg qd, Metformin ER 2g qd  On 12/30 she has no further episodes of hypoglycemia. I will need to review the meds with her and her daughter prior to discharge.  On 12/31 Patient had an episode of vomiting, with hypomagnesemia, and positive urine culture.  Patient was seen by ID specialist. Blood cultures were drawn and negative. The patient continued to spike fevers thru 1/4. On 1/5 She was stable, no further hypoglycemia. Her fever is most likely secondary to viral infection but she should follow up with her primary care for work up of fever of unknown origin if she continues to suffer from fever. If the fever is not from infectious etiology then she should see oncology.       Problem/Plan - 1:  ·  Problem: Hypoglycemia.  Plan: Stable w/ normal sugars now - monitor blood glucose level for now, w/ accuchecks AC/HS w/ RISS, Unknown etiology - Will reattempt contact w/ daughter, PMD & [facility] [address] to reconcile if any medications are actually being taken, A1c pending. Possibly secondary to oral hypoglycemics.  On 12/31 she has no further hypoglycemia on Prandin monotherapy.      Problem/Plan - 2:  ·  Problem: Hypomagnesemia.  Plan: Magnesium supplemented, repeat labs pending  Possibly secondary to decreased PO intake. On 12/31 we continued to aggressively replace magnesium.      Problem/Plan - 3:  ·  Problem: Essential hypertension.  Plan: Stable - continue Losartan.      Problem/Plan - 4:  ·  Problem: Type 2 diabetes mellitus with complication, without long-term current use of insulin.  Plan: insulin therapy protocol as above  Will consider insulin level.      Problem/Plan - 5:  ·  Problem: Elevated transaminase level.  Plan: chronically elevated but in the setting of abdominal pain and abnormal US previously, will check CT Abd w/ contrast.  Possibly may follow with GI as outpatient  Will reinforce lifestyle modifications and consider Nutrition consult.      Problem/Plan - 6:  Problem: Pancytopenia. Plan: Clinically stable but pt reports some constitutional sx and pt with known liver disease  No e/o malignancy, metastatic disease, or myelofibrosis; no recent travel  possibly 2/2 to an unspecified viral syndrome  -will review medication list as well as inquire daughter; about over-the-counter medications, health supplements, and home or folk remedies  -b12, folate level pending; retic ct, uric acid, PT/PTT pending.     Problem/Plan - 7:  ·  Problem: Preventive measure.  Plan: DVT ppx - lovenox subQ.      Problem/Plan - 8:  ·  Problem: Acute cystitis without hematuria.  Plan: No antibiotics due to low count.      Problem/Plan - 9:  ·  Problem: Anemia, unspecified type.  Plan: Follow up with Hematology as an outpatient.     Attending Attestation:   I was physically present for the key portions of the evaluation and management (E/M) service provided.  I agree with the above history, physical, and plan which I have reviewed and edited where appropriate.     45 minutes spent on total encounter; more than 50% of the visit was spent counseling and/or coordinating care by the attending physician.

## 2017-12-31 NOTE — DISCHARGE NOTE ADULT - CARE PLAN
Principal Discharge DX:	Type 2 diabetes mellitus with other specified complication, without long-term current use of insulin  Goal:	Avoid hypoglycemia  Instructions for follow-up, activity and diet:	Stop all Diabetes medications except for prandin 0.5 mg pre-meals and follow up with Dr Yen.  Secondary Diagnosis:	Abnormal CT scan, liver  Goal:	Follow up with GI  Secondary Diagnosis:	Acute cystitis without hematuria  Goal:	Prevent infection  Instructions for follow-up, activity and diet:	Follow up with primary care physician  Secondary Diagnosis:	Anemia, unspecified type  Goal:	Stable blood count  Instructions for follow-up, activity and diet:	Visit Hematology such as Dr Stewart  Secondary Diagnosis:	Dyslipidemia  Goal:	LDL less than 70  Instructions for follow-up, activity and diet:	Monitor lipid profile  Secondary Diagnosis:	Elevated transaminase level  Goal:	Follow up with GI  Secondary Diagnosis:	Essential hypertension  Goal:	Monitor BP

## 2018-01-01 ENCOUNTER — OUTPATIENT (OUTPATIENT)
Dept: OUTPATIENT SERVICES | Facility: HOSPITAL | Age: 68
LOS: 1 days | End: 2018-01-01
Payer: MEDICAID

## 2018-01-01 DIAGNOSIS — A49.8 OTHER BACTERIAL INFECTIONS OF UNSPECIFIED SITE: ICD-10-CM

## 2018-01-01 DIAGNOSIS — R50.9 FEVER, UNSPECIFIED: ICD-10-CM

## 2018-01-01 LAB
ANION GAP SERPL CALC-SCNC: 9 MMOL/L — SIGNIFICANT CHANGE UP (ref 5–17)
APPEARANCE UR: ABNORMAL
BACTERIA # UR AUTO: ABNORMAL
BILIRUB UR-MCNC: NEGATIVE — SIGNIFICANT CHANGE UP
BUN SERPL-MCNC: 7 MG/DL — LOW (ref 8–20)
CALCIUM SERPL-MCNC: 9 MG/DL — SIGNIFICANT CHANGE UP (ref 8.6–10.2)
CHLORIDE SERPL-SCNC: 103 MMOL/L — SIGNIFICANT CHANGE UP (ref 98–107)
CO2 SERPL-SCNC: 27 MMOL/L — SIGNIFICANT CHANGE UP (ref 22–29)
COLOR SPEC: YELLOW — SIGNIFICANT CHANGE UP
CREAT SERPL-MCNC: 0.69 MG/DL — SIGNIFICANT CHANGE UP (ref 0.5–1.3)
DIFF PNL FLD: NEGATIVE — SIGNIFICANT CHANGE UP
EPI CELLS # UR: SIGNIFICANT CHANGE UP
GLUCOSE BLDC GLUCOMTR-MCNC: 103 MG/DL — HIGH (ref 70–99)
GLUCOSE BLDC GLUCOMTR-MCNC: 119 MG/DL — HIGH (ref 70–99)
GLUCOSE BLDC GLUCOMTR-MCNC: 170 MG/DL — HIGH (ref 70–99)
GLUCOSE BLDC GLUCOMTR-MCNC: 89 MG/DL — SIGNIFICANT CHANGE UP (ref 70–99)
GLUCOSE SERPL-MCNC: 104 MG/DL — SIGNIFICANT CHANGE UP (ref 70–115)
GLUCOSE UR QL: NEGATIVE MG/DL — SIGNIFICANT CHANGE UP
HCT VFR BLD CALC: 28.3 % — LOW (ref 37–47)
HGB BLD-MCNC: 9 G/DL — LOW (ref 12–16)
KETONES UR-MCNC: NEGATIVE — SIGNIFICANT CHANGE UP
LEUKOCYTE ESTERASE UR-ACNC: NEGATIVE — SIGNIFICANT CHANGE UP
MAGNESIUM SERPL-MCNC: 1.7 MG/DL — SIGNIFICANT CHANGE UP (ref 1.6–2.6)
MCHC RBC-ENTMCNC: 26 PG — LOW (ref 27–31)
MCHC RBC-ENTMCNC: 31.8 G/DL — LOW (ref 32–36)
MCV RBC AUTO: 81.8 FL — SIGNIFICANT CHANGE UP (ref 81–99)
NITRITE UR-MCNC: POSITIVE
PH UR: 6 — SIGNIFICANT CHANGE UP (ref 5–8)
PHOSPHATE SERPL-MCNC: 3.4 MG/DL — SIGNIFICANT CHANGE UP (ref 2.4–4.7)
PLATELET # BLD AUTO: 138 K/UL — LOW (ref 150–400)
POTASSIUM SERPL-MCNC: 4.7 MMOL/L — SIGNIFICANT CHANGE UP (ref 3.5–5.3)
POTASSIUM SERPL-SCNC: 4.7 MMOL/L — SIGNIFICANT CHANGE UP (ref 3.5–5.3)
PROT UR-MCNC: NEGATIVE MG/DL — SIGNIFICANT CHANGE UP
RBC # BLD: 3.46 M/UL — LOW (ref 4.4–5.2)
RBC # FLD: 14.4 % — SIGNIFICANT CHANGE UP (ref 11–15.6)
RBC CASTS # UR COMP ASSIST: NEGATIVE /HPF — SIGNIFICANT CHANGE UP (ref 0–4)
SODIUM SERPL-SCNC: 139 MMOL/L — SIGNIFICANT CHANGE UP (ref 135–145)
SP GR SPEC: 1.01 — SIGNIFICANT CHANGE UP (ref 1.01–1.02)
UROBILINOGEN FLD QL: NEGATIVE MG/DL — SIGNIFICANT CHANGE UP
WBC # BLD: 3.3 K/UL — LOW (ref 4.8–10.8)
WBC # FLD AUTO: 3.3 K/UL — LOW (ref 4.8–10.8)
WBC UR QL: SIGNIFICANT CHANGE UP

## 2018-01-01 PROCEDURE — G9001: CPT

## 2018-01-01 PROCEDURE — 99233 SBSQ HOSP IP/OBS HIGH 50: CPT

## 2018-01-01 PROCEDURE — 99222 1ST HOSP IP/OBS MODERATE 55: CPT

## 2018-01-01 RX ORDER — ERTAPENEM SODIUM 1 G/1
1000 INJECTION, POWDER, LYOPHILIZED, FOR SOLUTION INTRAMUSCULAR; INTRAVENOUS EVERY 24 HOURS
Qty: 0 | Refills: 0 | Status: COMPLETED | OUTPATIENT
Start: 2018-01-02 | End: 2018-01-05

## 2018-01-01 RX ORDER — ACETAMINOPHEN 500 MG
650 TABLET ORAL EVERY 6 HOURS
Qty: 0 | Refills: 0 | Status: DISCONTINUED | OUTPATIENT
Start: 2018-01-01 | End: 2018-01-05

## 2018-01-01 RX ORDER — ERTAPENEM SODIUM 1 G/1
INJECTION, POWDER, LYOPHILIZED, FOR SOLUTION INTRAMUSCULAR; INTRAVENOUS
Qty: 0 | Refills: 0 | Status: COMPLETED | OUTPATIENT
Start: 2018-01-01 | End: 2018-01-05

## 2018-01-01 RX ORDER — ERTAPENEM SODIUM 1 G/1
1000 INJECTION, POWDER, LYOPHILIZED, FOR SOLUTION INTRAMUSCULAR; INTRAVENOUS ONCE
Qty: 0 | Refills: 0 | Status: COMPLETED | OUTPATIENT
Start: 2018-01-01 | End: 2018-01-01

## 2018-01-01 RX ADMIN — LOSARTAN POTASSIUM 100 MILLIGRAM(S): 100 TABLET, FILM COATED ORAL at 05:35

## 2018-01-01 RX ADMIN — REPAGLINIDE 0.5 MILLIGRAM(S): 1 TABLET ORAL at 05:35

## 2018-01-01 RX ADMIN — Medication 81 MILLIGRAM(S): at 12:14

## 2018-01-01 RX ADMIN — Medication 650 MILLIGRAM(S): at 15:30

## 2018-01-01 RX ADMIN — MONTELUKAST 10 MILLIGRAM(S): 4 TABLET, CHEWABLE ORAL at 12:14

## 2018-01-01 RX ADMIN — ENOXAPARIN SODIUM 40 MILLIGRAM(S): 100 INJECTION SUBCUTANEOUS at 12:14

## 2018-01-01 RX ADMIN — ERTAPENEM SODIUM 120 MILLIGRAM(S): 1 INJECTION, POWDER, LYOPHILIZED, FOR SOLUTION INTRAMUSCULAR; INTRAVENOUS at 16:09

## 2018-01-01 RX ADMIN — REPAGLINIDE 0.5 MILLIGRAM(S): 1 TABLET ORAL at 17:21

## 2018-01-01 RX ADMIN — Medication 1: at 12:13

## 2018-01-01 RX ADMIN — Medication 500 MILLIGRAM(S): at 12:14

## 2018-01-01 RX ADMIN — Medication 325 MILLIGRAM(S): at 12:14

## 2018-01-01 RX ADMIN — REPAGLINIDE 0.5 MILLIGRAM(S): 1 TABLET ORAL at 12:15

## 2018-01-01 RX ADMIN — PANTOPRAZOLE SODIUM 40 MILLIGRAM(S): 20 TABLET, DELAYED RELEASE ORAL at 05:35

## 2018-01-01 NOTE — CONSULT NOTE ADULT - PROBLEM SELECTOR RECOMMENDATION 9
Likely secondary to fatty liver. Clinically insignificant. Can and should be followed as an OPT. No plans or need for continued inpt. GI evaluation or f/u. Not the cause of her lower abdominal pain.
repeat blood cx today for fever  - start invanz

## 2018-01-01 NOTE — CONSULT NOTE ADULT - ASSESSMENT
Assessment and Plan:    Assessment:  · Assessment		  68 y/o female with h/o DM on oral hypoglycemics was found unresponsive. Accu check: was 22 mg/dl. repeat accu check in the Er after treatment was 57.   pt was seen by Dr. Orellana in 2015 for iron def anemia but never followed up.  Pt in November had normocytic anemia with otherwise normal CBC  on this admission she has a pancytopenia which has been stable.  She says she recently started Prandin but says she has been off all hypoglycemics for last month.      Pancytopenia most likely autoimmune vs medication induced - Prandin?   counts stable  to be d/c home today  instructed to f/u as outpt for further evaluation and management   d/w Dr. Balbuena
This 67 y.o. f with DM here for hypoglycemia, now with dysuria.  Admission Urine cx with strep agalactiae, and ESBL E coli,  Now with fevers 100.7.

## 2018-01-01 NOTE — CONSULT NOTE ADULT - SUBJECTIVE AND OBJECTIVE BOX
NPP INFECTIOUS DISEASES AND INTERNAL MEDICINE at [place]  =======================================================  Raymon Elliott MD [facility]   Kit Ortiz MD  Diplomates American Board of Internal Medicine and Infectious Diseases  =======================================================    [id number]  SARA CLOUD is a 67y  Female   This 67 y.o. F with  DM on oral hypoglycemics was found unresponsive, due to hypoglycemia of  22 mg/dl. repeat accu check in the ER after treatment was 57.   per notes, pt on prandin and metformin.  labs and cultures sent.    pt had UA with occ epithelial cells.  Urine cx with E coli 10k-49k cfu, and strep agalactiae more than 100k cfu.  patient had no fevers.   WBC was low on admission, but improving, being worked up as outpatient.       =======================================================  Past Medical & Surgical Hx:  =====================  PAST MEDICAL & SURGICAL HISTORY:  Anemia  Sleep apnea  Myocardial infarction  GERD (gastroesophageal reflux disease)  Dyslipidemia  Hypertension  Diabetes mellitus  H/O right knee surgery:   S/P cataract extraction: B/L  S/P partial thyroidectomy  S/P tubal ligation: s/p cyst removal of left axilla  S/P cholecystectomy      Problem List:  ==========  HEALTH ISSUES - PROBLEM Dx:  Anemia, unspecified type: Anemia, unspecified type  Acute cystitis without hematuria: Acute cystitis without hematuria  Abnormal CT scan, liver: Abnormal CT scan, liver  Preventive measure: Preventive measure  Pancytopenia: Pancytopenia  Elevated transaminase level: Elevated transaminase level  Type 2 diabetes mellitus with complication, without long-term current use of insulin: Type 2 diabetes mellitus with complication, without long-term current use of insulin  Essential hypertension: Essential hypertension  Hypomagnesemia: Hypomagnesemia  Hypoglycemia: Hypoglycemia     Social Hx:  =======  no toxic habits currently    FAMILY HISTORY:  No pertinent family history in first degree relatives    Allergies    No Known Allergies    Intolerances        MEDICATIONS  (STANDING):  ascorbic acid 500 milliGRAM(s) Oral daily  aspirin enteric coated 81 milliGRAM(s) Oral daily  dextrose 5%. 1000 milliLiter(s) (50 mL/Hr) IV Continuous <Continuous>  dextrose 50% Injectable 12.5 Gram(s) IV Push once  dextrose 50% Injectable 25 Gram(s) IV Push once  dextrose 50% Injectable 25 Gram(s) IV Push once  enoxaparin Injectable 40 milliGRAM(s) SubCutaneous daily  ferrous    sulfate 325 milliGRAM(s) Oral daily  influenza   Vaccine 0.5 milliLiter(s) IntraMuscular once  insulin lispro (HumaLOG) corrective regimen sliding scale   SubCutaneous three times a day before meals  losartan 100 milliGRAM(s) Oral daily  montelukast 10 milliGRAM(s) Oral daily  pantoprazole    Tablet 40 milliGRAM(s) Oral before breakfast  repaglinide 0.5 milliGRAM(s) Oral three times a day before meals    MEDICATIONS  (PRN):  dextrose Gel 1 Dose(s) Oral once PRN Blood Glucose LESS THAN 70 milliGRAM(s)/deciliter  glucagon  Injectable 1 milliGRAM(s) IntraMuscular once PRN Glucose LESS THAN 70 milligrams/deciliter  traMADol 50 milliGRAM(s) Oral two times a day PRN Moderate Pain (4 - 6)        =======================================================  REVIEW OF SYSTEMS:  Constitutional: No fever, No chills, No sweats.  Eye: No icterus, No double vision.  Ear/Nose/Mouth/Throat: No nasal congestion, No sore throat.  Respiratory: No shortness of breath, No cough, No sputum production, No wheezing.  Cardiovascular: No chest pain, No palpitations, No syncope.  Gastrointestinal: No nausea, No vomiting, No diarrhea, No abdominal pain.  Genitourinary: No dysuria, No hematuria, No change in urine stream.  Hematology/Lymphatics: No bleeding tendency.  Endocrine: No excessive thirst, No polyuria.  Immunologic: No malaise.  Musculoskeletal: No back pain, No neck pain, No joint pain, No muscle pain.  Integumentary: No rash, No pruritus, No skin lesion.  Neurologic: No numbness, No tingling, No headache.  Psychiatric: No depression.    =======================================================  I&O's Detail      Physical Exam:  ============  Vital Signs Last 24 Hrs  T(C): 36.7 (2018 08:12), Max: 37.7 (31 Dec 2017 23:20)  T(F): 98.1 (2018 08:12), Max: 99.8 (31 Dec 2017 23:20)  HR: 82 (2018 08:12) (76 - 99)  BP: 151/86 (2018 08:12) (111/59 - 151/86)  BP(mean): --  RR: 18 (2018 08:12) (17 - 18)  SpO2: 98% (2018 08:12) (97% - 98%)    General: Alert and oriented, No acute distress.  Eye: Pupils are equal, round and reactive to light, Extraocular movements are intact, Normal conjunctiva.  HENT: Normocephalic, Oral mucosa is moist, No pharyngeal erythema, No sinus tenderness.  Neck: Supple, No lymphadenopathy.  Respiratory: Lungs are clear to auscultation, Respirations are non-labored.  Cardiovascular: Normal rate, Regular rhythm, No murmur, Good pulses equal in all extremities, No edema.  Gastrointestinal: Soft, Non-tender, Non-distended, Normal bowel sounds.  Genitourinary: No costovertebral angle tenderness.  Lymphatics: No lymphadenopathy neck, axilla, groin.  Musculoskeletal: Normal range of motion, Normal strength.  Integumentary: No rash.  Neurologic: Alert, Oriented, No focal deficits, Cranial Nerves II-XII are grossly intact.  Psychiatric: Appropriate mood & affect.      =======================================================  Labs:  ====    Labs:      139  |  103  |  7.0<L>  ----------------------------<  104  4.7   |  27.0  |  0.69    Ca    9.0      2018 08:56  Phos  3.4       Mg     1.7                                 9.0    3.3   )-----------( 138      ( 2018 08:56 )             28.3         Urinalysis Basic - ( 2018 08:22 )    Color: Yellow / Appearance: Slightly Turbid / S.010 / pH: x  Gluc: x / Ketone: Negative  / Bili: Negative / Urobili: Negative mg/dL   Blood: x / Protein: Negative mg/dL / Nitrite: Positive   Leuk Esterase: Negative / RBC: Negative /HPF / WBC 0-2   Sq Epi: x / Non Sq Epi: Occasional / Bacteria: Many         CAPILLARY BLOOD GLUCOSE      POCT Blood Glucose.: 170 mg/dL (2018 11:26)  POCT Blood Glucose.: 103 mg/dL (2018 07:37)  POCT Blood Glucose.: 139 mg/dL (31 Dec 2017 23:43)  POCT Blood Glucose.: 74 mg/dL (31 Dec 2017 21:24)  POCT Blood Glucose.: 144 mg/dL (31 Dec 2017 16:37)        RECENT CULTURES:   @ 19:54          [facility]   @ 05:19 .Urine Clean Catch (Midstream) Escherichia coli ESBL    10,000 - 49,000 CFU/mL Escherichia coli ESBL  >100,000 CFU/ml Streptococcus agalactiae (Group B)  .  TYPE: (C=Critical, N=Notification, A=Abnormal) c  TESTS:  _ E. coli ESBL  DATE/TIME CALLED: _ 2017 12:57:40  CALLED TO: _ jeronimo luna rn  READ BACK (2 Patient Identifiers)(Y/N): _ y  READ BACK VALUES (Y/N): _ y  CALLED BY: _ m welge  copy to  pt log NPP INFECTIOUS DISEASES AND INTERNAL MEDICINE at [place]  =======================================================  Raymon Elliott MD [facility]   Kit Ortiz MD  Diplomates American Board of Internal Medicine and Infectious Diseases  =======================================================    [facility]-[id number]  SARA CLOUD is a 67y  Female   This 67 y.o. F with  DM on oral hypoglycemics was found unresponsive, due to hypoglycemia of  22 mg/dl. repeat accu check in the ER after treatment was 57.   per notes, pt on prandin and metformin.  labs and cultures sent.    pt had UA with occ epithelial cells.  Urine cx with E coli 10k-49k cfu, and strep agalactiae more than 100k cfu.  patient had no fevers on admission.    WBC was low on admission, but improving, being worked up as outpatient.     on further questioning, patient, reports feeling dysuria, although urine is clear and dark yellow. she reports some strong odor.   Prior to coming here, she had no urinary symptoms..   She reports feeling warm today too, feverish.   Temp checked currently is 100.7.      =======================================================  Past Medical & Surgical Hx:  =====================  PAST MEDICAL & SURGICAL HISTORY:  Anemia  Sleep apnea  Myocardial infarction  GERD (gastroesophageal reflux disease)  Dyslipidemia  Hypertension  Diabetes mellitus  H/O right knee surgery:   S/P cataract extraction: B/L  S/P partial thyroidectomy  S/P tubal ligation: s/p cyst removal of left axilla  S/P cholecystectomy      Problem List:  ==========  HEALTH ISSUES - PROBLEM Dx:  Anemia, unspecified type: Anemia, unspecified type  Acute cystitis without hematuria: Acute cystitis without hematuria  Abnormal CT scan, liver: Abnormal CT scan, liver  Preventive measure: Preventive measure  Pancytopenia: Pancytopenia  Elevated transaminase level: Elevated transaminase level  Type 2 diabetes mellitus with complication, without long-term current use of insulin: Type 2 diabetes mellitus with complication, without long-term current use of insulin  Essential hypertension: Essential hypertension  Hypomagnesemia: Hypomagnesemia  Hypoglycemia: Hypoglycemia     Social Hx:  =======  no toxic habits currently    FAMILY HISTORY:  No pertinent family history in first degree relatives    Allergies    No Known Allergies    Intolerances        MEDICATIONS  (STANDING):  ascorbic acid 500 milliGRAM(s) Oral daily  aspirin enteric coated 81 milliGRAM(s) Oral daily  dextrose 5%. 1000 milliLiter(s) (50 mL/Hr) IV Continuous <Continuous>  dextrose 50% Injectable 12.5 Gram(s) IV Push once  dextrose 50% Injectable 25 Gram(s) IV Push once  dextrose 50% Injectable 25 Gram(s) IV Push once  enoxaparin Injectable 40 milliGRAM(s) SubCutaneous daily  ferrous    sulfate 325 milliGRAM(s) Oral daily  influenza   Vaccine 0.5 milliLiter(s) IntraMuscular once  insulin lispro (HumaLOG) corrective regimen sliding scale   SubCutaneous three times a day before meals  losartan 100 milliGRAM(s) Oral daily  montelukast 10 milliGRAM(s) Oral daily  pantoprazole    Tablet 40 milliGRAM(s) Oral before breakfast  repaglinide 0.5 milliGRAM(s) Oral three times a day before meals    MEDICATIONS  (PRN):  dextrose Gel 1 Dose(s) Oral once PRN Blood Glucose LESS THAN 70 milliGRAM(s)/deciliter  glucagon  Injectable 1 milliGRAM(s) IntraMuscular once PRN Glucose LESS THAN 70 milligrams/deciliter  traMADol 50 milliGRAM(s) Oral two times a day PRN Moderate Pain (4 - 6)        =======================================================  REVIEW OF SYSTEMS:  Constitutional: No fever, No chills, No sweats.  Eye: No icterus, No double vision.  Ear/Nose/Mouth/Throat: No nasal congestion, No sore throat.  Respiratory: No shortness of breath, No cough, No sputum production, No wheezing.  Cardiovascular: No chest pain, No palpitations, No syncope.  Gastrointestinal:  No nausea, No vomiting, No diarrhea, No abdominal pain.  Genitourinary: AS ABOVE  Hematology/Lymphatics: No bleeding tendency.  Endocrine: No excessive thirst, No polyuria.  Immunologic: No malaise.  Musculoskeletal: No back pain, No neck pain, No joint pain, No muscle pain.  Integumentary: No rash, No pruritus, No skin lesion.  Neurologic: No numbness, No tingling, No headache.  Psychiatric: No depression.    =======================================================  I&O's Detail      Physical Exam:  ============  Vital Signs Last 24 Hrs  T(C): 36.7 (2018 08:12), Max: 37.7 (31 Dec 2017 23:20)  T(F): 98.1 (2018 08:12), Max: 99.8 (31 Dec 2017 23:20)  HR: 82 (2018 08:12) (76 - 99)  BP: 151/86 (2018 08:12) (111/59 - 151/86)  BP(mean): --  RR: 18 (2018 08:12) (17 - 18)  SpO2: 98% (2018 08:12) (97% - 98%)    General: Alert and oriented, No acute distress.  Eye: Pupils are equal, round and reactive to light, Extraocular movements are intact, Normal conjunctiva.  HENT: Normocephalic, Oral mucosa is moist, No pharyngeal erythema, No sinus tenderness.  Neck: Supple, No lymphadenopathy.  Respiratory: Lungs are clear to auscultation, Respirations are non-labored.  Cardiovascular: Normal rate, Regular rhythm, No murmur, Good pulses equal in all extremities, No edema.  Gastrointestinal: Soft, Non-tender, Non-distended, Normal bowel sounds.   Genitourinary: No costovertebral angle tenderness. NO SUPRAPUBIC TENDERNESS  Lymphatics: No lymphadenopathy neck, axilla, groin.  Musculoskeletal: Normal range of motion, Normal strength.  Integumentary: No rash.  Neurologic: Alert, Oriented, No focal deficits, Cranial Nerves II-XII are grossly intact.  Psychiatric: Appropriate mood & affect.      =======================================================  Labs:  ====    Labs:      139  |  103  |  7.0<L>  ----------------------------<  104  4.7   |  27.0  |  0.69    Ca    9.0      2018 08:56  Phos  3.4       Mg     1.7                                 9.0    3.3   )-----------( 138      ( 2018 08:56 )             28.3         Urinalysis Basic - ( 2018 08:22 )    Color: Yellow / Appearance: Slightly Turbid / S.010 / pH: x  Gluc: x / Ketone: Negative  / Bili: Negative / Urobili: Negative mg/dL   Blood: x / Protein: Negative mg/dL / Nitrite: Positive   Leuk Esterase: Negative / RBC: Negative /HPF / WBC 0-2   Sq Epi: x / Non Sq Epi: Occasional / Bacteria: Many         POCT Blood Glucose.: 170 mg/dL (2018 11:26)  POCT Blood Glucose.: 103 mg/dL (2018 07:37)  POCT Blood Glucose.: 139 mg/dL (31 Dec 2017 23:43)  POCT Blood Glucose.: 74 mg/dL (31 Dec 2017 21:24)  POCT Blood Glucose.: 144 mg/dL (31 Dec 2017 16:37)        RECENT CULTURES:   @ 19:54          [facility]   @ 05:19 .Urine Clean Catch (Midstream) Escherichia coli ESBL    10,000 - 49,000 CFU/mL Escherichia coli ESBL  >100,000 CFU/ml Streptococcus agalactiae (Group B)  .  TYPE: (C=Critical, N=Notification, A=Abnormal) c  TESTS:  _ E. coli ESBL  DATE/TIME CALLED: _ 2017 12:57:40  CALLED TO: Danny luna rn  READ BACK (2 Patient Identifiers)(Y/N): _ y  READ BACK VALUES (Y/N): _ y  CALLED BY: _ mine welge  copy to  pt log

## 2018-01-01 NOTE — PROGRESS NOTE ADULT - ASSESSMENT
66 y/o female with hypoglycemia, hypomagnesemia, HTN, DM II, chronic elevation of transaminases and c/o RLQ abdominal pain, N/V/F with constipation, admitted for hypoglycemia, symptomatic, now resolved.  No new complaints.  Pt states that she has not taken any diabetes medication in the past month ever since last episode of hypoglycemia 1 month ago.  Still awaiting call back from family member (daughter).  Per Salumed Pt filled:  In June- Tujeo 33u qhs. In Nov- Victoza 1.2 mg qd, Farxiga 5mg qd, Metformin ER 2g qd  On 12/30 she has no further episodes of hypoglycemia. I will need to review the meds with her and her daughter prior to discharge.  On 12/31 Patient had an episode of vomiting, with hypomagnesemia, and positive urine culture. 66 y/o female with hypoglycemia, hypomagnesemia, HTN, DM II, chronic elevation of transaminases and c/o RLQ abdominal pain, N/V/F with constipation, admitted for hypoglycemia, symptomatic, now resolved.  No new complaints.  Pt states that she has not taken any diabetes medication in the past month ever since last episode of hypoglycemia 1 month ago.  Still awaiting call back from family member (daughter).  Per Salumed Pt filled:  In June- Tujeo 33u qhs. In Nov- Victoza 1.2 mg qd, Farxiga 5mg qd, Metformin ER 2g qd  On 12/30 she has no further episodes of hypoglycemia. I will need to review the meds with her and her daughter prior to discharge.  On 12/31 Patient had an episode of vomiting, with hypomagnesemia, and positive urine culture.  1/1/2018 patient seen by ID Add ertapenem for fever.

## 2018-01-01 NOTE — PROGRESS NOTE ADULT - PROBLEM SELECTOR PLAN 9
Follow up with Hematology as an outpatient ID input appreciated   Start ertapenem, follow up cultures.

## 2018-01-01 NOTE — CONSULT NOTE ADULT - SUBJECTIVE AND OBJECTIVE BOX
History of Present Illness: 	  66 y/o female with h/o DM on oral hypoglycemics was found unresponsive. Accu check: was 22 mg/dl. repeat accu check in the Er after treatment was 57.   pt was seen by Dr. Orellana in 2015 for iron def anemia but never followed up.  Pt in November had normocytic anemia with otherwise normal CBC  on this admission she has a pancytopenia which has been stable.  She says she recently started Prandin but says she has been off all hypoglycemics for last month.       Review of Systems:  · Negative General Symptoms	no fever; no chills	  · Negative Skin Symptoms	no rash; no itching	  · Negative Ophthalmologic Symptoms	no diplopia; no photophobia	  · Negative ENMT Symptoms	no hearing difficulty	  · Negative Respiratory and Thorax Symptoms	no wheezing; no cough	  · Negative Cardiovascular Symptoms	no chest pain; no palpitations; no orthopnea; no peripheral edema	  · Negative Gastrointestinal Symptoms	no nausea; no vomiting; no diarrhea; no abdominal pain; no melena; no hematochezia	  · Negative General Genitourinary Symptoms	no hematuria; no dysuria	  · Negative Neurological Symptoms	no transient paralysis; no weakness	      Allergies and Intolerances:        Allergies:  	No Known Allergies:     Home Medications:   * Incomplete Medication History as of 29-Dec-2017 04:03 documented in Structured Notes  · 	omeprazole 20 mg oral delayed release tablet: 1 tab(s) orally once a day, Last Dose Taken:    · 	metFORMIN 500 mg oral tablet, extended release: 1 tab(s) orally once a day  	HOLD FOR 2 days, Last Dose Taken:    · 	Aspir 81 oral delayed release tablet: 1 tab(s) orally once a day, Last Dose Taken:    · 	repaglinide 2 mg oral tablet: 1 tab(s) orally 3 times a day (before meals), Last Dose Taken:    · 	losartan-hydrochlorothiazide 100mg-12.5mg oral tablet: 1 tab(s) orally once a day, Last Dose Taken:    · 	traMADol 50 mg oral tablet: orally 2 times a day, Last Dose Taken:    · 	Victoza 18 mg/3 mL subcutaneous solution: Last Dose Taken:      .    Patient History:    Past Medical History:  Anemia    Diabetes mellitus    Dyslipidemia    GERD (gastroesophageal reflux disease)    Hypertension    Myocardial infarction    Sleep apnea.     Past Surgical History:  H/O right knee surgery  8/20  S/P cataract extraction  B/L  S/P cholecystectomy    S/P partial thyroidectomy    S/P tubal ligation  s/p cyst removal of left axilla.     Family History:  No pertinent family history in first degree relatives.     Tobacco Screening:  · Core Measure Site	No	  · Has the patient used tobacco in the past 30 days?	No	    Risk Assessment:    Present on Admission:  Deep Venous Thrombosis	no	  Pulmonary Embolus	no	  Urinary Catheter	no	  Central Venous Catheter/PICC Line	no	  Surgical Site Incision	no	  Pressure Ulcer(s)	no	     Heart Failure:  Does this patient have a history of or has been diagnosed with heart failure? no.    Hepatitis C Screen (per [facility] Department of Health, hepatitis C screening must be offered to every individual born between 1945 and 1965)	Unable to offer due to clinical condition	       Physical Exam:  · Constitutional	detailed exam	  · Constitutional Details	well-developed; well-nourished; no distress	  · Eyes	detailed exam	  · Eyes Details	PERRL; EOMI	  · ENMT	detailed exam	  · ENMT Details	mouth	  · Mouth	moist	  · Neck	detailed exam 	  · Neck Details	supple; no JVD	  · Respiratory	detailed exam	  · Respiratory Details	breath sounds equal; clear to auscultation bilaterally	  · Cardiovascular	detailed exam	  · Cardiovascular Details	regular rate and rhythm  no murmur 	  · Gastrointestinal	detailed exam	  · GI Normal	soft; nontender; no distention; bowel sounds normal	  · Extremities	detailed exam 	  · Extremities Details	no clubbing; no cyanosis; no pedal edema; no calf tenderness	  · Neurological	detailed exam	  · Neurological Details	alert and oriented x 3	  · Skin	detailed exam	  · Skin Details	warm and dry	      Laboratory:   General Chemistry:	    29-Dec-2017 01:41, Comprehensive Metabolic Panel	  Sodium, Serum: 135, [135 - 145 mmol/L]	  Potassium, Serum: 3.8, [3.5 - 5.3 mmol/L]	  Chloride, Serum: 101, [98 - 107 mmol/L]	  Carbon Dioxide, Serum: 22.0, [22.0 - 29.0 mmol/L]	  Anion Gap, Serum: 12, [5 - 17 mmol/L]	  Blood Urea Nitrogen, Serum: 19.0, [8.0 - 20.0 mg/dL]	  Creatinine, Serum: 0.86, [0.50 - 1.30 mg/dL]	  Glucose, Serum:    53, [70 - 115 mg/dL]	  Calcium, Total Serum:    8.4, [8.6 - 10.2 mg/dL]	  Protein Total, Serum:    5.9, [6.6 - 8.7 g/dL]	  Albumin, Serum:    3.2, [3.3 - 5.2 g/dL]	  Bilirubin Total, Serum: 0.4, [0.4 - 2.0 mg/dL]	  Alkaline Phosphatase, Serum:    155, [40 - 120 U/L]	  Aspartate Aminotransferase (AST/SGOT):    107, [ - <=31 U/L]	  Alanine Aminotransferase (ALT/SGPT):    48, [ - <=32 U/L]	  eGFR if Non : 69, [>=60 mL/min/1.73M2], Interpretative comment  The units for eGFR are ml/min/1.73m2 (normalized body surface area). The  eGFR is calculated from a serum creatinine using the CKD-EPI equation.  Other variables required for calculation are race, age and sex. Among  patients with chronic kidney disease (CKD), the eGFR is useful in  determining the stage of disease according to KDOQI CKD classification.  All eGFR results are reported numerically with the following  interpretation.          GFR                    With                 Without     (ml/min/1.73 m2)    Kidney Damage       Kidney Damage        >= 90                    Stage 1                     Normal        60-89                    Stage 2                     Decreased GFR        30-59     Stage 3                     Stage 3        15-29                    Stage 4                     Stage 4        < 15                      Stage 5                     Stage 5  Each stage of CKD assumes that the associated GFR level has been in  effect for at least 3 months. Determination of stages one and two (with  eGFR > 59 ml/min/m2) requires estimation of kidney damage for at least 3  months as defined by structural or functional abnormalities.  Limitations: All estimates of GFR will be less accurate for patients at  extremes of muscle mass (including but not limited to frail elderly,  critically ill, or cancer patients), those with unusual diets, and those  with conditions associated with reduced secretion or extrarenal  elimination of creatinine. The eGFR equation is not recommended for use  in patients with unstable creatinine levels.	  eGFR if : 80, [>=60 mL/min/1.73M2]	    29-Dec-2017 01:41, Magnesium, Serum	  Magnesium, Serum:    1.0, [1.6 - 2.6 mg/dL], TYPE:(C=Critical, N=Notification, A=Abnormal) C  TESTS: _magnesium  DATE/TIME CALLED: _12/29/17 02:05  CALLED TO: vijay santana  READ BACK (2 Patient Identifiers)(Y/N): _y  READ BACK VALUES (Y/N): _y  CALLED BY: _cp	    29-Dec-2017 01:41, Troponin T, Serum	  Troponin T, Serum: <0.01, [0.00 - 0.06 ng/mL], Reference Interval for Troponin T  Less than 0.06 ng/mL - includes the 99th percentile of a healthy  population at a method C.V. of 10% or less.  NOTE: Troponin T is measured by the Roche CLIA method and these  results are not interchangable with Troponin I results since they are  different assays with different reference intervals.	  Coagulation:	    29-Dec-2017 01:41, Activated Partial Thromboplastin Time	  Activated Partial Thromboplastin Time: 35.6, [27.5 - 37.4 sec], The recommended therapeutic heparin range (full dose) is 58-99 seconds.  Recommended therapeutic Argatroban range is 1.5 to 3.0 times the baseline  APTT value, not to exceed 100 seconds. Recommended therapeutic Refludan  range is 1.5 to 2.5 times thebaseline APTT.	    29-Dec-2017 01:41, Prothrombin Time and INR, Plasma	  Prothrombin Time, Plasma: 12.4, [9.8 - 12.7 sec], Effective March 21st, the reference range for PT has changed.	  INR: 1.12, [0.88 - 1.16 ratio], RECOMMENDED RANGES FOR THERAPEUTIC INR:    2.0-3.0 for most medical and surgical thromboembolic states    2.0-3.0 for atrial fibrillation    2.0-3.0 for bileaflet mechanical valve in aortic position    2.5-3.5 for mechanical heart valves   Chest 2004;[id number]  The presence of direct thrombin inhibitors (argatroban, refludan)  may falsely increase results.	  Hematology:	    29-Dec-2017 01:41, Complete Blood Count + Automated Diff	  WBC Count:    2.6, [4.8 - 10.8 K/uL]	  RBC Count:    3.34, [4.40 - 5.20 M/uL]	  Hemoglobin:    9.1, [12.0 - 16.0 g/dL]	  Hematocrit:    27.2, [37.0 - 47.0 %]	  Mean Cell Volume: 81.4, [81.0 - 99.0 fl]	  Mean Cell Hemoglobin: 27.2, [27.0 - 31.0 pg]	  Mean Cell Hemoglobin Conc: 33.5, [32.0 - 36.0 g/dL]	  Red Cell Distrib Width: 15.0, [11.0 - 15.6 %]	  Platelet Count - Automated: 151, [150 - 400 K/uL]	  Auto Neutrophil %:    76.0, [37.0 - 73.0 %], Differential percentages must be correlated with absolute numbers for  clinical significance.	  Auto Lymphocyte %:    17.0, [20.0 - 55.0 %]	  Auto Monocyte %: 5.0, [3.0 - 10.0 %]	  Auto Eosinophil %: 2.0, [0.0 - 5.0 %]	    29-Dec-2017 01:41, Manual Differential	  Elliptocytes: Slight	  Comment - Hematology: Specimen checked for clot.	  Anisocytosis: Slight	  Microcytosis: Slight	  Red Cell Morphology: [Normal], Non Specific	  Platelet Morphology: Normal, [Normal]	  Camila Cell: Present	  Poikilocytosis: Slight

## 2018-01-01 NOTE — CONSULT NOTE ADULT - PROBLEM SELECTOR RECOMMENDATION 2
Liver cysts clinically insignificant and not responsible for her lower abdominal pain. Liver unchanged in appearance from previous CT scan. Stable for discharge home. See above recommendations. Will not follow pt. here. Refer to our office for OPT evaluation and follow up. Thank you.
as above

## 2018-01-01 NOTE — PROGRESS NOTE ADULT - SUBJECTIVE AND OBJECTIVE BOX
SARA CLOUD     Chief Complaint: Patient is a 67y old  Female who presents with a chief complaint of hypoglycemia (31 Dec 2017 14:04)      PAST MEDICAL & SURGICAL HISTORY:  Anemia  Sleep apnea  Myocardial infarction  GERD (gastroesophageal reflux disease)  Dyslipidemia  Hypertension  Diabetes mellitus  H/O right knee surgery:   S/P cataract extraction: B/L  S/P partial thyroidectomy  S/P tubal ligation: s/p cyst removal of left axilla  S/P cholecystectomy      HPI/OVERNIGHT EVENTS:    MEDICATIONS  (STANDING):  ascorbic acid 500 milliGRAM(s) Oral daily  aspirin enteric coated 81 milliGRAM(s) Oral daily  dextrose 5%. 1000 milliLiter(s) (50 mL/Hr) IV Continuous <Continuous>  dextrose 50% Injectable 12.5 Gram(s) IV Push once  dextrose 50% Injectable 25 Gram(s) IV Push once  dextrose 50% Injectable 25 Gram(s) IV Push once  enoxaparin Injectable 40 milliGRAM(s) SubCutaneous daily  ertapenem  IVPB      ferrous    sulfate 325 milliGRAM(s) Oral daily  influenza   Vaccine 0.5 milliLiter(s) IntraMuscular once  insulin lispro (HumaLOG) corrective regimen sliding scale   SubCutaneous three times a day before meals  losartan 100 milliGRAM(s) Oral daily  montelukast 10 milliGRAM(s) Oral daily  pantoprazole    Tablet 40 milliGRAM(s) Oral before breakfast  repaglinide 0.5 milliGRAM(s) Oral three times a day before meals      Vital Signs Last 24 Hrs  T(C): 38.2 (2018 14:47), Max: 38.2 (2018 14:47)  T(F): 100.7 (2018 14:47), Max: 100.7 (2018 14:47)  HR: 82 (2018 08:12) (76 - 99)  BP: 151/86 (2018 08:12) (111/59 - 151/86)  BP(mean): --  RR: 18 (2018 08:12) (17 - 18)  SpO2: 98% (2018 08:12) (97% - 98%)    PHYSICAL EXAM:  Constitutional: NAD, well-groomed, well-developed  HEENT: PERRLA, EOMI, Normal Hearing, MMM  Neck: No LAD, No JVD  Back: Normal spine flexure, No CVA tenderness  Respiratory: CTAB Cardiovascular: S1 and S2, RRR, no M/G/R  Gastrointestinal: BS+, soft, NT/ND  Extremities: No peripheral edema  Vascular: 2+ peripheral pulses  Neurological: A/O x 3, no focal deficits  Psychiatric: Normal mood, normal affect  Musculoskeletal: 5/5 strength b/l upper and lower extremities  Skin: No rashes    CAPILLARY BLOOD GLUCOSE    LABS:                        9.0    3.3   )-----------( 138      ( 2018 08:56 )             28.3         139  |  103  |  7.0<L>  ----------------------------<  104  4.7   |  27.0  |  0.69    Ca    9.0      2018 08:56  Phos  3.4       Mg     1.7             Urinalysis Basic - ( 2018 08:22 )    Color: Yellow / Appearance: Slightly Turbid / S.010 / pH: x  Gluc: x / Ketone: Negative  / Bili: Negative / Urobili: Negative mg/dL   Blood: x / Protein: Negative mg/dL / Nitrite: Positive   Leuk Esterase: Negative / RBC: Negative /HPF / WBC 0-2   Sq Epi: x / Non Sq Epi: Occasional / Bacteria: Many        RADIOLOGY & ADDITIONAL TESTS:

## 2018-01-01 NOTE — CONSULT NOTE ADULT - PROBLEM SELECTOR PROBLEM 1
Elevated transaminase level
Infection due to extended-spectrum beta-lactamase-producing Escherichia coli

## 2018-01-02 LAB
ANION GAP SERPL CALC-SCNC: 11 MMOL/L — SIGNIFICANT CHANGE UP (ref 5–17)
ANISOCYTOSIS BLD QL: SLIGHT — SIGNIFICANT CHANGE UP
BUN SERPL-MCNC: 9 MG/DL — SIGNIFICANT CHANGE UP (ref 8–20)
CALCIUM SERPL-MCNC: 9.1 MG/DL — SIGNIFICANT CHANGE UP (ref 8.6–10.2)
CHLORIDE SERPL-SCNC: 102 MMOL/L — SIGNIFICANT CHANGE UP (ref 98–107)
CO2 SERPL-SCNC: 27 MMOL/L — SIGNIFICANT CHANGE UP (ref 22–29)
CREAT SERPL-MCNC: 0.75 MG/DL — SIGNIFICANT CHANGE UP (ref 0.5–1.3)
ELLIPTOCYTES BLD QL SMEAR: SLIGHT — SIGNIFICANT CHANGE UP
EOSINOPHIL NFR BLD AUTO: 2 % — SIGNIFICANT CHANGE UP (ref 0–5)
GLUCOSE BLDC GLUCOMTR-MCNC: 120 MG/DL — HIGH (ref 70–99)
GLUCOSE BLDC GLUCOMTR-MCNC: 138 MG/DL — HIGH (ref 70–99)
GLUCOSE BLDC GLUCOMTR-MCNC: 46 MG/DL — LOW (ref 70–99)
GLUCOSE BLDC GLUCOMTR-MCNC: 69 MG/DL — LOW (ref 70–99)
GLUCOSE BLDC GLUCOMTR-MCNC: 83 MG/DL — SIGNIFICANT CHANGE UP (ref 70–99)
GLUCOSE BLDC GLUCOMTR-MCNC: 89 MG/DL — SIGNIFICANT CHANGE UP (ref 70–99)
GLUCOSE SERPL-MCNC: 40 MG/DL — CRITICAL LOW (ref 70–115)
HCT VFR BLD CALC: 27.7 % — LOW (ref 37–47)
HGB BLD-MCNC: 8.9 G/DL — LOW (ref 12–16)
LYMPHOCYTES # BLD AUTO: 17 % — LOW (ref 20–55)
MACROCYTES BLD QL: SLIGHT — SIGNIFICANT CHANGE UP
MAGNESIUM SERPL-MCNC: 1.3 MG/DL — LOW (ref 1.6–2.6)
MCHC RBC-ENTMCNC: 26.6 PG — LOW (ref 27–31)
MCHC RBC-ENTMCNC: 32.1 G/DL — SIGNIFICANT CHANGE UP (ref 32–36)
MCV RBC AUTO: 82.9 FL — SIGNIFICANT CHANGE UP (ref 81–99)
MICROCYTES BLD QL: SLIGHT — SIGNIFICANT CHANGE UP
MONOCYTES NFR BLD AUTO: 6 % — SIGNIFICANT CHANGE UP (ref 3–10)
NEUTROPHILS NFR BLD AUTO: 75 % — HIGH (ref 37–73)
OVALOCYTES BLD QL SMEAR: SLIGHT — SIGNIFICANT CHANGE UP
PHOSPHATE SERPL-MCNC: 3.4 MG/DL — SIGNIFICANT CHANGE UP (ref 2.4–4.7)
PLAT MORPH BLD: NORMAL — SIGNIFICANT CHANGE UP
PLATELET # BLD AUTO: 140 K/UL — LOW (ref 150–400)
POIKILOCYTOSIS BLD QL AUTO: SLIGHT — SIGNIFICANT CHANGE UP
POTASSIUM SERPL-MCNC: 4.4 MMOL/L — SIGNIFICANT CHANGE UP (ref 3.5–5.3)
POTASSIUM SERPL-SCNC: 4.4 MMOL/L — SIGNIFICANT CHANGE UP (ref 3.5–5.3)
RBC # BLD: 3.34 M/UL — LOW (ref 4.4–5.2)
RBC # FLD: 14.4 % — SIGNIFICANT CHANGE UP (ref 11–15.6)
RBC BLD AUTO: ABNORMAL
SODIUM SERPL-SCNC: 140 MMOL/L — SIGNIFICANT CHANGE UP (ref 135–145)
WBC # BLD: 2.7 K/UL — LOW (ref 4.8–10.8)
WBC # FLD AUTO: 2.7 K/UL — LOW (ref 4.8–10.8)

## 2018-01-02 PROCEDURE — 99233 SBSQ HOSP IP/OBS HIGH 50: CPT

## 2018-01-02 RX ORDER — MAGNESIUM OXIDE 400 MG ORAL TABLET 241.3 MG
400 TABLET ORAL
Qty: 0 | Refills: 0 | Status: DISCONTINUED | OUTPATIENT
Start: 2018-01-02 | End: 2018-01-05

## 2018-01-02 RX ORDER — MAGNESIUM SULFATE 500 MG/ML
2 VIAL (ML) INJECTION EVERY 6 HOURS
Qty: 0 | Refills: 0 | Status: COMPLETED | OUTPATIENT
Start: 2018-01-02 | End: 2018-01-02

## 2018-01-02 RX ADMIN — REPAGLINIDE 0.5 MILLIGRAM(S): 1 TABLET ORAL at 05:12

## 2018-01-02 RX ADMIN — Medication 50 GRAM(S): at 23:20

## 2018-01-02 RX ADMIN — Medication 50 GRAM(S): at 17:22

## 2018-01-02 RX ADMIN — ENOXAPARIN SODIUM 40 MILLIGRAM(S): 100 INJECTION SUBCUTANEOUS at 12:33

## 2018-01-02 RX ADMIN — MAGNESIUM OXIDE 400 MG ORAL TABLET 400 MILLIGRAM(S): 241.3 TABLET ORAL at 17:22

## 2018-01-02 RX ADMIN — MONTELUKAST 10 MILLIGRAM(S): 4 TABLET, CHEWABLE ORAL at 12:34

## 2018-01-02 RX ADMIN — Medication 81 MILLIGRAM(S): at 12:33

## 2018-01-02 RX ADMIN — Medication 325 MILLIGRAM(S): at 12:33

## 2018-01-02 RX ADMIN — ERTAPENEM SODIUM 120 MILLIGRAM(S): 1 INJECTION, POWDER, LYOPHILIZED, FOR SOLUTION INTRAMUSCULAR; INTRAVENOUS at 15:15

## 2018-01-02 RX ADMIN — Medication 500 MILLIGRAM(S): at 12:33

## 2018-01-02 RX ADMIN — PANTOPRAZOLE SODIUM 40 MILLIGRAM(S): 20 TABLET, DELAYED RELEASE ORAL at 05:12

## 2018-01-02 RX ADMIN — LOSARTAN POTASSIUM 100 MILLIGRAM(S): 100 TABLET, FILM COATED ORAL at 05:11

## 2018-01-02 NOTE — PROGRESS NOTE ADULT - SUBJECTIVE AND OBJECTIVE BOX
History of Present Illness: 	  68 y/o female with h/o DM on oral hypoglycemics was found unresponsive. Accu check: was 22 mg/dl. repeat accu check in the Er after treatment was 57.   pt was seen by Dr. Orellana in 2015 for iron def anemia but never followed up.  Pt in November had normocytic anemia with otherwise normal CBC  on this admission she has a pancytopenia which has been stable.  She says she recently started Prandin but says she has been off all hypoglycemics for last month.          INTERVAL HISTORY    patient denies nay bleeding symptoms. reports diarrhea. no fevers    Allergies and Intolerances:        Allergies:  	No Known Allergies:     Home Medications:   * Incomplete Medication History as of 29-Dec-2017 04:03 documented in Structured Notes  · 	omeprazole 20 mg oral delayed release tablet: 1 tab(s) orally once a day, Last Dose Taken:    · 	metFORMIN 500 mg oral tablet, extended release: 1 tab(s) orally once a day  	HOLD FOR 2 days, Last Dose Taken:    · 	Aspir 81 oral delayed release tablet: 1 tab(s) orally once a day, Last Dose Taken:    · 	repaglinide 2 mg oral tablet: 1 tab(s) orally 3 times a day (before meals), Last Dose Taken:    · 	losartan-hydrochlorothiazide 100mg-12.5mg oral tablet: 1 tab(s) orally once a day, Last Dose Taken:    · 	traMADol 50 mg oral tablet: orally 2 times a day, Last Dose Taken:    · 	Victoza 18 mg/3 mL subcutaneous solution: Last Dose Taken:        MEDICATIONS  (STANDING):  ascorbic acid 500 milliGRAM(s) Oral daily  aspirin enteric coated 81 milliGRAM(s) Oral daily  dextrose 5%. 1000 milliLiter(s) (50 mL/Hr) IV Continuous <Continuous>  dextrose 50% Injectable 12.5 Gram(s) IV Push once  dextrose 50% Injectable 25 Gram(s) IV Push once  dextrose 50% Injectable 25 Gram(s) IV Push once  enoxaparin Injectable 40 milliGRAM(s) SubCutaneous daily  ertapenem  IVPB      ertapenem  IVPB 1000 milliGRAM(s) IV Intermittent every 24 hours  ferrous    sulfate 325 milliGRAM(s) Oral daily  influenza   Vaccine 0.5 milliLiter(s) IntraMuscular once  insulin lispro (HumaLOG) corrective regimen sliding scale   SubCutaneous three times a day before meals  losartan 100 milliGRAM(s) Oral daily  montelukast 10 milliGRAM(s) Oral daily  pantoprazole    Tablet 40 milliGRAM(s) Oral before breakfast    .    Patient History:    Past Medical History:  Anemia    Diabetes mellitus    Dyslipidemia    GERD (gastroesophageal reflux disease)    Hypertension    Myocardial infarction    Sleep apnea.     Past Surgical History:  H/O right knee surgery  8/20  S/P cataract extraction  B/L  S/P cholecystectomy    S/P partial thyroidectomy    S/P tubal ligation  s/p cyst removal of left axilla.     Family History:  No pertinent family history in first degree relatives.     Tobacco Screening:  · Core Measure Site	No	  · Has the patient used tobacco in the past 30 days?	No	    Risk Assessment:    Present on Admission:  Deep Venous Thrombosis	no	  Pulmonary Embolus	no	  Urinary Catheter	no	  Central Venous Catheter/PICC Line	no	  Surgical Site Incision	no	  Pressure Ulcer(s)	no	     Heart Failure:  Does this patient have a history of or has been diagnosed with heart failure? no.    Hepatitis C Screen (per [facility] [place] Department of Health, hepatitis C screening must be offered to every individual born between 1945 and 1965)	Unable to offer due to clinical condition	       Physical Exam:  Vital Signs Last 24 Hrs  T(C): 37.8 (02 Jan 2018 08:19), Max: 38.9 (01 Jan 2018 15:55)  T(F): 100.1 (02 Jan 2018 08:19), Max: 102 (01 Jan 2018 15:55)  HR: 104 (02 Jan 2018 08:19) (76 - 108)  BP: 112/65 (02 Jan 2018 08:19) (112/65 - 134/91)  BP(mean): --  RR: 18 (02 Jan 2018 08:19) (18 - 18)  SpO2: 94% (02 Jan 2018 08:19) (94% - 97%)    · Constitutional	NAD	  no icterus  CTAB  NT ND                  9.0                  139  | 27.0 | 7.0         3.3   >-----------< 138     ------------------------< 104                  28.3                 4.7  | 103  | 0.69                                       Ca 9.0   Mg 1.7   Ph 3.4

## 2018-01-02 NOTE — PROGRESS NOTE ADULT - PROBLEM SELECTOR PLAN 4
insulin therapy protocol as above. Will consider insulin level. On 1/2 I discontinued prandin and ordered baseline insulin and c peptide levels with concomitant glucose levels.

## 2018-01-02 NOTE — PROGRESS NOTE ADULT - SUBJECTIVE AND OBJECTIVE BOX
SARA CLOUD     Chief Complaint: Patient is a 67y old  Female who presents with a chief complaint of hypoglycemia (31 Dec 2017 14:04)      PAST MEDICAL & SURGICAL HISTORY:  Anemia  Sleep apnea  Myocardial infarction  GERD (gastroesophageal reflux disease)  Dyslipidemia  Hypertension  Diabetes mellitus  H/O right knee surgery:   S/P cataract extraction: B/L  S/P partial thyroidectomy  S/P tubal ligation: s/p cyst removal of left axilla  S/P cholecystectomy      HPI/OVERNIGHT EVENTS: Patient was hypoglycemic again today on Prandin. Discontinued Prandin and monitor for hypoglycemia, await blood cultures.    MEDICATIONS  (STANDING):  ascorbic acid 500 milliGRAM(s) Oral daily  aspirin enteric coated 81 milliGRAM(s) Oral daily  dextrose 5%. 1000 milliLiter(s) (50 mL/Hr) IV Continuous <Continuous>  dextrose 50% Injectable 12.5 Gram(s) IV Push once  dextrose 50% Injectable 25 Gram(s) IV Push once  dextrose 50% Injectable 25 Gram(s) IV Push once  enoxaparin Injectable 40 milliGRAM(s) SubCutaneous daily  ertapenem  IVPB      ertapenem  IVPB 1000 milliGRAM(s) IV Intermittent every 24 hours  ferrous    sulfate 325 milliGRAM(s) Oral daily  influenza   Vaccine 0.5 milliLiter(s) IntraMuscular once  insulin lispro (HumaLOG) corrective regimen sliding scale   SubCutaneous three times a day before meals  losartan 100 milliGRAM(s) Oral daily  montelukast 10 milliGRAM(s) Oral daily  pantoprazole    Tablet 40 milliGRAM(s) Oral before breakfast      Vital Signs Last 24 Hrs  T(C): 37.8 (2018 08:19), Max: 38.9 (2018 15:55)  T(F): 100.1 (2018 08:19), Max: 102 (2018 15:55)  HR: 104 (2018 08:19) (76 - 108)  BP: 112/65 (2018 08:19) (112/65 - 134/91)  BP(mean): --  RR: 18 (2018 08:19) (18 - 18)  SpO2: 94% (2018 08:19) (94% - 97%)    PHYSICAL EXAM:  Constitutional: NAD, well-groomed, well-developed  HEENT: PERRLA, EOMI, Normal Hearing, MMM  Neck: No LAD, No JVD  Back: Normal spine flexure, No CVA tenderness  Respiratory: CTAB Cardiovascular: S1 and S2, RRR, no M/G/R  Gastrointestinal: BS+, soft, NT/ND  Extremities: No peripheral edema  Vascular: 2+ peripheral pulses  Neurological: A/O x 3, no focal deficits  Psychiatric: Normal mood, normal affect  Musculoskeletal: 5/5 strength b/l upper and lower extremities  Skin: No rashes    CAPILLARY BLOOD GLUCOSE    LABS:                        8.9    2.7   )-----------( x        ( 2018 08:54 )             27.7     01-02    140  |  102  |  9.0  ----------------------------<  40<LL>  4.4   |  27.0  |  0.75    Ca    9.1      2018 08:54  Phos  3.4     01-02  Mg     1.3     -        Urinalysis Basic - ( 2018 08:22 )    Color: Yellow / Appearance: Slightly Turbid / S.010 / pH: x  Gluc: x / Ketone: Negative  / Bili: Negative / Urobili: Negative mg/dL   Blood: x / Protein: Negative mg/dL / Nitrite: Positive   Leuk Esterase: Negative / RBC: Negative /HPF / WBC 0-2   Sq Epi: x / Non Sq Epi: Occasional / Bacteria: Many        RADIOLOGY & ADDITIONAL TESTS:

## 2018-01-02 NOTE — PROGRESS NOTE ADULT - ASSESSMENT
Assessment and Plan:    Assessment:  · Assessment		  68 y/o female with h/o DM on oral hypoglycemics was found unresponsive. Accu check: was 22 mg/dl. repeat accu check in the Er after treatment was 57.   pt was seen by Dr. Orellana in 2015 for iron def anemia but never followed up.  Pt in November had normocytic anemia with otherwise normal CBC  on this admission she has a pancytopenia which has been stable.  She says she recently started Prandin but says she has been off all hypoglycemics for last month.      Pancytopenia most likely autoimmune vs medication induced - Prandin?   can follow up as outpatient. will check B12, iron studies, SPEP. If counts continue to be low, will consider bone marrow biopsy.   CT abd negative for splenomegaly.

## 2018-01-02 NOTE — PROGRESS NOTE ADULT - ASSESSMENT
66 y/o female with hypoglycemia, hypomagnesemia, HTN, DM II, chronic elevation of transaminases and c/o RLQ abdominal pain, N/V/F with constipation, admitted for hypoglycemia, symptomatic, now resolved.  No new complaints.  Pt states that she has not taken any diabetes medication in the past month ever since last episode of hypoglycemia 1 month ago.  Still awaiting call back from family member (daughter).  Per [facility] Pt filled:  In June- Tujeo 33u qhs. In Nov- Victoza 1.2 mg qd, Farxiga 5mg qd, Metformin ER 2g qd.  On 12/30 she has no further episodes of hypoglycemia. I will need to review the meds with her and her daughter prior to discharge.  On 12/31 Patient had an episode of vomiting, with hypomagnesemia, and positive urine culture.  1/1/2018 patient seen by ID Add ertapenem for fever. On 1/2 She was hypoglycemic on prandin which is unususal. Hopefully the discontinuation of prandin resolves hypoglycemia otherwise consider insulinoma evaluation.

## 2018-01-02 NOTE — PROGRESS NOTE ADULT - PROBLEM SELECTOR PLAN 1
Stable w/ normal sugars now - monitor blood glucose level for now, w/ accuchecks AC/HS w/ RISS, Unknown etiology - Will reattempt contact w/ daughter, PMD & [facility] [address] to reconcile if any medications are actually being taken, A1c pending. Possibly secondary to oral hypoglycemics. On 12/31 she has no further hypoglycemia on Prandin monotherapy. Discontinue Prandin.

## 2018-01-03 LAB
ANION GAP SERPL CALC-SCNC: 13 MMOL/L — SIGNIFICANT CHANGE UP (ref 5–17)
BASOPHILS # BLD AUTO: 0 K/UL — SIGNIFICANT CHANGE UP (ref 0–0.2)
BASOPHILS NFR BLD AUTO: 0.4 % — SIGNIFICANT CHANGE UP (ref 0–2)
BUN SERPL-MCNC: 9 MG/DL — SIGNIFICANT CHANGE UP (ref 8–20)
C PEPTIDE SERPL-MCNC: 5.6 NG/ML — SIGNIFICANT CHANGE UP (ref 0.9–7.1)
CALCIUM SERPL-MCNC: 9.1 MG/DL — SIGNIFICANT CHANGE UP (ref 8.6–10.2)
CHLORIDE SERPL-SCNC: 100 MMOL/L — SIGNIFICANT CHANGE UP (ref 98–107)
CO2 SERPL-SCNC: 26 MMOL/L — SIGNIFICANT CHANGE UP (ref 22–29)
CREAT SERPL-MCNC: 0.72 MG/DL — SIGNIFICANT CHANGE UP (ref 0.5–1.3)
EOSINOPHIL # BLD AUTO: 0.1 K/UL — SIGNIFICANT CHANGE UP (ref 0–0.5)
EOSINOPHIL NFR BLD AUTO: 2.8 % — SIGNIFICANT CHANGE UP (ref 0–6)
GLUCOSE BLDC GLUCOMTR-MCNC: 100 MG/DL — HIGH (ref 70–99)
GLUCOSE BLDC GLUCOMTR-MCNC: 124 MG/DL — HIGH (ref 70–99)
GLUCOSE BLDC GLUCOMTR-MCNC: 131 MG/DL — HIGH (ref 70–99)
GLUCOSE BLDC GLUCOMTR-MCNC: 200 MG/DL — HIGH (ref 70–99)
GLUCOSE SERPL-MCNC: 138 MG/DL — HIGH (ref 70–115)
HCT VFR BLD CALC: 26.1 % — LOW (ref 37–47)
HGB BLD-MCNC: 8.6 G/DL — LOW (ref 12–16)
INSULIN SERPL-MCNC: 14.9 UU/ML — SIGNIFICANT CHANGE UP (ref 3–17)
LYMPHOCYTES # BLD AUTO: 0.6 K/UL — LOW (ref 1–4.8)
LYMPHOCYTES # BLD AUTO: 22.9 % — SIGNIFICANT CHANGE UP (ref 20–55)
MAGNESIUM SERPL-MCNC: 1.9 MG/DL — SIGNIFICANT CHANGE UP (ref 1.6–2.6)
MCHC RBC-ENTMCNC: 27.3 PG — SIGNIFICANT CHANGE UP (ref 27–31)
MCHC RBC-ENTMCNC: 33 G/DL — SIGNIFICANT CHANGE UP (ref 32–36)
MCV RBC AUTO: 82.9 FL — SIGNIFICANT CHANGE UP (ref 81–99)
MONOCYTES # BLD AUTO: 0.2 K/UL — SIGNIFICANT CHANGE UP (ref 0–0.8)
MONOCYTES NFR BLD AUTO: 6.3 % — SIGNIFICANT CHANGE UP (ref 3–10)
NEUTROPHILS # BLD AUTO: 1.7 K/UL — LOW (ref 1.8–8)
NEUTROPHILS NFR BLD AUTO: 66.8 % — SIGNIFICANT CHANGE UP (ref 37–73)
PLATELET # BLD AUTO: 120 K/UL — LOW (ref 150–400)
POTASSIUM SERPL-MCNC: 4.2 MMOL/L — SIGNIFICANT CHANGE UP (ref 3.5–5.3)
POTASSIUM SERPL-SCNC: 4.2 MMOL/L — SIGNIFICANT CHANGE UP (ref 3.5–5.3)
RBC # BLD: 3.15 M/UL — LOW (ref 4.4–5.2)
RBC # FLD: 14.9 % — SIGNIFICANT CHANGE UP (ref 11–15.6)
SODIUM SERPL-SCNC: 139 MMOL/L — SIGNIFICANT CHANGE UP (ref 135–145)
WBC # BLD: 2.5 K/UL — LOW (ref 4.8–10.8)
WBC # FLD AUTO: 2.5 K/UL — LOW (ref 4.8–10.8)

## 2018-01-03 PROCEDURE — 99233 SBSQ HOSP IP/OBS HIGH 50: CPT

## 2018-01-03 PROCEDURE — 99232 SBSQ HOSP IP/OBS MODERATE 35: CPT

## 2018-01-03 RX ADMIN — LOSARTAN POTASSIUM 100 MILLIGRAM(S): 100 TABLET, FILM COATED ORAL at 05:52

## 2018-01-03 RX ADMIN — PANTOPRAZOLE SODIUM 40 MILLIGRAM(S): 20 TABLET, DELAYED RELEASE ORAL at 05:52

## 2018-01-03 RX ADMIN — Medication 500 MILLIGRAM(S): at 12:26

## 2018-01-03 RX ADMIN — ENOXAPARIN SODIUM 40 MILLIGRAM(S): 100 INJECTION SUBCUTANEOUS at 12:26

## 2018-01-03 RX ADMIN — Medication 325 MILLIGRAM(S): at 12:26

## 2018-01-03 RX ADMIN — Medication 650 MILLIGRAM(S): at 22:08

## 2018-01-03 RX ADMIN — MAGNESIUM OXIDE 400 MG ORAL TABLET 400 MILLIGRAM(S): 241.3 TABLET ORAL at 12:26

## 2018-01-03 RX ADMIN — MAGNESIUM OXIDE 400 MG ORAL TABLET 400 MILLIGRAM(S): 241.3 TABLET ORAL at 17:11

## 2018-01-03 RX ADMIN — ERTAPENEM SODIUM 120 MILLIGRAM(S): 1 INJECTION, POWDER, LYOPHILIZED, FOR SOLUTION INTRAMUSCULAR; INTRAVENOUS at 14:39

## 2018-01-03 RX ADMIN — MAGNESIUM OXIDE 400 MG ORAL TABLET 400 MILLIGRAM(S): 241.3 TABLET ORAL at 08:16

## 2018-01-03 RX ADMIN — MONTELUKAST 10 MILLIGRAM(S): 4 TABLET, CHEWABLE ORAL at 12:26

## 2018-01-03 RX ADMIN — Medication 650 MILLIGRAM(S): at 00:31

## 2018-01-03 RX ADMIN — Medication 1: at 12:26

## 2018-01-03 RX ADMIN — Medication 81 MILLIGRAM(S): at 12:26

## 2018-01-03 RX ADMIN — Medication 650 MILLIGRAM(S): at 11:26

## 2018-01-03 NOTE — PROGRESS NOTE ADULT - PROBLEM SELECTOR PLAN 4
insulin therapy protocol as above. Will consider insulin level. On 1/2 I discontinued prandin and ordered baseline insulin and c peptide levels with concomitant glucose levels. HGBA1C at goal.

## 2018-01-03 NOTE — PROGRESS NOTE ADULT - ASSESSMENT
This 67 y.o. f with DM here for hypoglycemia, now with dysuria.  Admission Urine cx with strep agalactiae, and ESBL E coli,  Now with fevers 100.7., possible UTI

## 2018-01-03 NOTE — PROGRESS NOTE ADULT - PROBLEM SELECTOR PLAN 1
HAS BEEN ON EFFECTIVE ABX FOR 3 DAYS; STILL WITH FEVERS, NEEDS TO R/O OTHER SOURCE   - CHECK RVP  - IF DIARRHEA CONTINUES, CONSIDER WORKUP

## 2018-01-03 NOTE — PROGRESS NOTE ADULT - ASSESSMENT
66 y/o female with hypoglycemia, hypomagnesemia, HTN, DM II, chronic elevation of transaminases and c/o RLQ abdominal pain, N/V/F with constipation, admitted for hypoglycemia, symptomatic, now resolved.  No new complaints.  Pt states that she has not taken any diabetes medication in the past month ever since last episode of hypoglycemia 1 month ago.  Still awaiting call back from family member (daughter).  Per [facility] Pt filled:  In June- Tujeo 33u qhs. In Nov- Victoza 1.2 mg qd, Farxiga 5mg qd, Metformin ER 2g qd.  On 12/30 she has no further episodes of hypoglycemia. I will need to review the meds with her and her daughter prior to discharge.  On 12/31 Patient had an episode of vomiting, with hypomagnesemia, and positive urine culture.  1/1/2018 patient seen by ID Add ertapenem for fever. On 1/2 She was hypoglycemic on prandin which is unususal. Hopefully the discontinuation of prandin resolves hypoglycemia otherwise consider insulinoma evaluation. On 1/3 Patient has had no further episodes of hypoglycemia but continues to spike low grade fevers.

## 2018-01-03 NOTE — PROGRESS NOTE ADULT - SUBJECTIVE AND OBJECTIVE BOX
SARA CLOUD     Chief Complaint: Patient is a 67y old  Female who presents with a chief complaint of hypoglycemia (31 Dec 2017 14:04)      PAST MEDICAL & SURGICAL HISTORY:  Anemia  Sleep apnea  Myocardial infarction  GERD (gastroesophageal reflux disease)  Dyslipidemia  Hypertension  Diabetes mellitus  H/O right knee surgery: 8/20  S/P cataract extraction: B/L  S/P partial thyroidectomy  S/P tubal ligation: s/p cyst removal of left axilla  S/P cholecystectomy      HPI/OVERNIGHT EVENTS: Patient is complaining of diarrhea and depression. C Diff is negative.    MEDICATIONS  (STANDING):  ascorbic acid 500 milliGRAM(s) Oral daily  aspirin enteric coated 81 milliGRAM(s) Oral daily  dextrose 5%. 1000 milliLiter(s) (50 mL/Hr) IV Continuous <Continuous>  dextrose 50% Injectable 12.5 Gram(s) IV Push once  dextrose 50% Injectable 25 Gram(s) IV Push once  dextrose 50% Injectable 25 Gram(s) IV Push once  enoxaparin Injectable 40 milliGRAM(s) SubCutaneous daily  ertapenem  IVPB      ertapenem  IVPB 1000 milliGRAM(s) IV Intermittent every 24 hours  ferrous    sulfate 325 milliGRAM(s) Oral daily  influenza   Vaccine 0.5 milliLiter(s) IntraMuscular once  insulin lispro (HumaLOG) corrective regimen sliding scale   SubCutaneous three times a day before meals  losartan 100 milliGRAM(s) Oral daily  magnesium oxide 400 milliGRAM(s) Oral three times a day with meals  montelukast 10 milliGRAM(s) Oral daily  pantoprazole    Tablet 40 milliGRAM(s) Oral before breakfast      Vital Signs Last 24 Hrs  T(C): 37.7 (03 Jan 2018 12:25), Max: 39.4 (03 Jan 2018 00:10)  T(F): 99.9 (03 Jan 2018 12:25), Max: 102.9 (03 Jan 2018 00:10)  HR: 93 (03 Jan 2018 09:21) (85 - 98)  BP: 143/85 (03 Jan 2018 09:21) (115/69 - 143/85)  BP(mean): --  RR: 18 (03 Jan 2018 09:21) (18 - 19)  SpO2: 94% (03 Jan 2018 09:21) (94% - 97%)    PHYSICAL EXAM:  Constitutional: NAD, well-groomed, well-developed  HEENT: PERRLA, EOMI, Normal Hearing, MMM  Neck: No LAD, No JVD  Back: Normal spine flexure, No CVA tenderness  Respiratory: CTAB Cardiovascular: S1 and S2, RRR, no M/G/R  Gastrointestinal: BS+, soft, NT/ND  Extremities: No peripheral edema  Vascular: 2+ peripheral pulses  Neurological: A/O x 3, no focal deficits  Psychiatric: Normal mood, normal affect  Musculoskeletal: 5/5 strength b/l upper and lower extremities  Skin: No rashes    CAPILLARY BLOOD GLUCOSE    LABS:                        8.6    2.5   )-----------( 120      ( 03 Jan 2018 07:17 )             26.1     01-03    139  |  100  |  9.0  ----------------------------<  138<H>  4.2   |  26.0  |  0.72    Ca    9.1      03 Jan 2018 07:17  Phos  3.4     01-02  Mg     1.9     01-03            RADIOLOGY & ADDITIONAL TESTS:

## 2018-01-03 NOTE — PROGRESS NOTE ADULT - SUBJECTIVE AND OBJECTIVE BOX
[facility]  INFECTIOUS DISEASES AND INTERNAL MEDICINE at [place]  =======================================================  Raymon Elliott MD [facility]   Kit Ortiz MD  Diplomates American Board of Internal Medicine and Infectious Diseases  =======================================================    CLOUD, SARA [id number]  chief complaint: follow up on UTI and fevers    patient seen and examined in follow up.  Chart and labs reviewed.   still with fevers,   no new culture data.     =======================================================  Allergies:  No Known Allergies      =======================================================  Medications:  acetaminophen   Tablet 650 milliGRAM(s) Oral every 6 hours PRN  ascorbic acid 500 milliGRAM(s) Oral daily  aspirin enteric coated 81 milliGRAM(s) Oral daily  dextrose 5%. 1000 milliLiter(s) IV Continuous <Continuous>  dextrose 50% Injectable 12.5 Gram(s) IV Push once  dextrose 50% Injectable 25 Gram(s) IV Push once  dextrose 50% Injectable 25 Gram(s) IV Push once  dextrose Gel 1 Dose(s) Oral once PRN  enoxaparin Injectable 40 milliGRAM(s) SubCutaneous daily  ertapenem  IVPB      ertapenem  IVPB 1000 milliGRAM(s) IV Intermittent every 24 hours  ferrous    sulfate 325 milliGRAM(s) Oral daily  glucagon  Injectable 1 milliGRAM(s) IntraMuscular once PRN  influenza   Vaccine 0.5 milliLiter(s) IntraMuscular once  insulin lispro (HumaLOG) corrective regimen sliding scale   SubCutaneous three times a day before meals  losartan 100 milliGRAM(s) Oral daily  magnesium oxide 400 milliGRAM(s) Oral three times a day with meals  montelukast 10 milliGRAM(s) Oral daily  pantoprazole    Tablet 40 milliGRAM(s) Oral before breakfast  traMADol 50 milliGRAM(s) Oral two times a day PRN    =======================================================     REVIEW OF SYSTEMS:  CONSTITUTIONAL:  No Fever or chills  HEENT:   No diplopia or blurred vision.  No earache, sore throat or runny nose.  CARDIOVASCULAR:  No pressure, squeezing, strangling, tightness, heaviness or aching about the chest, neck, axilla or epigastrium.  RESPIRATORY:  No cough, shortness of breath, PND or orthopnea.  GASTROINTESTINAL:  No nausea, vomiting or diarrhea.  GENITOURINARY:  No dysuria, frequency or urgency. No Blood in urine  MUSCULOSKELETAL:  no joint aches, no muscle pain  SKIN:  No change in skin, hair or nails.  NEUROLOGIC:  No paresthesias, fasciculations, seizures or weakness.  PSYCHIATRIC:  No disorder of thought or mood.  ENDOCRINE:  No heat or cold intolerance, polyuria or polydipsia.  HEMATOLOGICAL:  No easy bruising or bleeding.     =======================================================    Physical Exam:    Vital Signs Last 24 Hrs  T(C): 37.7 (03 Jan 2018 09:21), Max: 39.4 (03 Jan 2018 00:10)  T(F): 99.9 (03 Jan 2018 09:21), Max: 102.9 (03 Jan 2018 00:10)  HR: 93 (03 Jan 2018 09:21) (85 - 98)  BP: 143/85 (03 Jan 2018 09:21) (115/69 - 143/85)  RR: 18 (03 Jan 2018 09:21) (18 - 19)  SpO2: 94% (03 Jan 2018 09:21) (94% - 97%)    GEN: NAD, pleasant  HEENT: normocephalic and atraumatic. EOMI. AMELIA.    NECK: Supple. No carotid bruits.  No lymphadenopathy or thyromegaly.  LUNGS: Clear to auscultation.  HEART: Regular rate and rhythm without murmur.  ABDOMEN: Soft, nontender, and nondistended.  Positive bowel sounds.    : No CVA tenderness  EXTREMITIES: Without any cyanosis, clubbing, rash, lesions or edema.  MSK: no joint swelling  NEUROLOGIC: Cranial nerves II through XII are grossly intact.  PSYCHIATRIC: Appropriate affect .  SKIN: No ulceration or induration present.    =======================================================  Labs:  01-03    139  |  100  |  9.0  ----------------------------<  138<H>  4.2   |  26.0  |  0.72    Ca    9.1      03 Jan 2018 07:17  Phos  3.4     01-02  Mg     1.9     01-03                            8.6    2.5   )-----------( 120      ( 03 Jan 2018 07:17 )             26.1       RECENT CULTURES:  12-29 @ 19:54          [facility]  12-29 @ 05:19 .Urine Clean Catch (Midstream) Escherichia coli ESBL    10,000 - 49,000 CFU/mL Escherichia coli ESBL  >100,000 CFU/ml Streptococcus agalactiae (Group B)  .  TYPE: (C=Critical, N=Notification, A=Abnormal) c  TESTS:  _ E. coli ESBL  DATE/TIME CALLED: _ 12/31/2017 12:57:40  CALLED TO: Danny luna rn  READ BACK (2 Patient Identifiers)(Y/N): _ y  READ BACK VALUES (Y/N): _ y  CALLED BY: _ m welge  copy to  pt log [facility]  INFECTIOUS DISEASES AND INTERNAL MEDICINE at [place]  =======================================================  Raymon Elliott MD [facility]   Kit Ortiz MD  Diplomates American Board of Internal Medicine and Infectious Diseases  =======================================================    CLOUD, SARA [id number]  chief complaint: follow up on UTI and fevers    patient seen and examined in follow up.  Chart and labs reviewed.   still with fevers,   blood cx sent 1/2/18- pending.   has loose BM  also has cough    =======================================================  Allergies:  No Known Allergies      =======================================================  Medications:  acetaminophen   Tablet 650 milliGRAM(s) Oral every 6 hours PRN  ascorbic acid 500 milliGRAM(s) Oral daily  aspirin enteric coated 81 milliGRAM(s) Oral daily  dextrose 5%. 1000 milliLiter(s) IV Continuous <Continuous>  dextrose 50% Injectable 12.5 Gram(s) IV Push once  dextrose 50% Injectable 25 Gram(s) IV Push once  dextrose 50% Injectable 25 Gram(s) IV Push once  dextrose Gel 1 Dose(s) Oral once PRN  enoxaparin Injectable 40 milliGRAM(s) SubCutaneous daily  ertapenem  IVPB      ertapenem  IVPB 1000 milliGRAM(s) IV Intermittent every 24 hours  ferrous    sulfate 325 milliGRAM(s) Oral daily  glucagon  Injectable 1 milliGRAM(s) IntraMuscular once PRN  influenza   Vaccine 0.5 milliLiter(s) IntraMuscular once  insulin lispro (HumaLOG) corrective regimen sliding scale   SubCutaneous three times a day before meals  losartan 100 milliGRAM(s) Oral daily  magnesium oxide 400 milliGRAM(s) Oral three times a day with meals  montelukast 10 milliGRAM(s) Oral daily  pantoprazole    Tablet 40 milliGRAM(s) Oral before breakfast  traMADol 50 milliGRAM(s) Oral two times a day PRN    =======================================================     REVIEW OF SYSTEMS:  CONSTITUTIONAL:  No Fever or chills  HEENT:   No diplopia or blurred vision.  No earache, sore throat or runny nose.  CARDIOVASCULAR:  No pressure, squeezing, strangling, tightness, heaviness or aching about the chest, neck, axilla or epigastrium.  RESPIRATORY:  No cough, shortness of breath, PND or orthopnea.  GASTROINTESTINAL:  No nausea, vomiting or diarrhea.  GENITOURINARY:  RESOLVED DYSURIA  MUSCULOSKELETAL:  no joint aches, no muscle pain  SKIN:  No change in skin, hair or nails.  NEUROLOGIC:  No paresthesias, fasciculations, seizures or weakness.  PSYCHIATRIC:  No disorder of thought or mood.  ENDOCRINE:  No heat or cold intolerance, polyuria or polydipsia.  HEMATOLOGICAL:  No easy bruising or bleeding.     =======================================================    Physical Exam:    Vital Signs Last 24 Hrs  T(C): 37.7 (03 Jan 2018 09:21), Max: 39.4 (03 Jan 2018 00:10)  T(F): 99.9 (03 Jan 2018 09:21), Max: 102.9 (03 Jan 2018 00:10)  HR: 93 (03 Jan 2018 09:21) (85 - 98)  BP: 143/85 (03 Jan 2018 09:21) (115/69 - 143/85)  RR: 18 (03 Jan 2018 09:21) (18 - 19)  SpO2: 94% (03 Jan 2018 09:21) (94% - 97%)    GEN: NAD, pleasant  HEENT: normocephalic and atraumatic. EOMI. AMELIA.    NECK: Supple. No carotid bruits.  No lymphadenopathy or thyromegaly.  LUNGS: Clear to auscultation. GOOD AIR ENTRY  HEART: Regular rate and rhythm without murmur.  ABDOMEN: Soft, nontender, and nondistended.  Positive bowel sounds.    : No CVA tenderness  EXTREMITIES: Without any cyanosis, clubbing, rash, lesions or edema.  MSK: no joint swelling  NEUROLOGIC: Cranial nerves II through XII are grossly intact.  PSYCHIATRIC: Appropriate affect .  SKIN: No ulceration or induration present.  WARM TO TOUCH    =======================================================  Labs:  01-03    139  |  100  |  9.0  ----------------------------<  138<H>  4.2   |  26.0  |  0.72    Ca    9.1      03 Jan 2018 07:17  Phos  3.4     01-02  Mg     1.9     01-03                            8.6    2.5   )-----------( 120      ( 03 Jan 2018 07:17 )             26.1       RECENT CULTURES:  12-29 @ 19:54          [facility]  12-29 @ 05:19 .Urine Clean Catch (Midstream) Escherichia coli ESBL    10,000 - 49,000 CFU/mL Escherichia coli ESBL  >100,000 CFU/ml Streptococcus agalactiae (Group B)  .  TYPE: (C=Critical, N=Notification, A=Abnormal) c  TESTS:  _ E. coli ESBL  DATE/TIME CALLED: _ 12/31/2017 12:57:40  CALLED TO: Danny luna rn  READ BACK (2 Patient Identifiers)(Y/N): _ y  READ BACK VALUES (Y/N): _ y  CALLED BY: _ m welge  copy to  pt log

## 2018-01-03 NOTE — PROGRESS NOTE ADULT - PROBLEM SELECTOR PLAN 1
Stable w/ normal sugars now - monitor blood glucose level for now, w/ accuchecks AC/HS w/ RISS, Unknown etiology - Will reattempt contact w/ daughter, PMD & [facility] [address] to reconcile if any medications are actually being taken, A1c pending. Possibly secondary to oral hypoglycemics. On 12/31 she has no further hypoglycemia on Prandin monotherapy. Discontinue Prandin. On 1/3 No further episodes of hypoglycemia.

## 2018-01-04 LAB
ANION GAP SERPL CALC-SCNC: 14 MMOL/L — SIGNIFICANT CHANGE UP (ref 5–17)
BUN SERPL-MCNC: 17 MG/DL — SIGNIFICANT CHANGE UP (ref 8–20)
CALCIUM SERPL-MCNC: 9 MG/DL — SIGNIFICANT CHANGE UP (ref 8.6–10.2)
CHLORIDE SERPL-SCNC: 97 MMOL/L — LOW (ref 98–107)
CO2 SERPL-SCNC: 23 MMOL/L — SIGNIFICANT CHANGE UP (ref 22–29)
CREAT SERPL-MCNC: 1.47 MG/DL — HIGH (ref 0.5–1.3)
GLUCOSE BLDC GLUCOMTR-MCNC: 100 MG/DL — HIGH (ref 70–99)
GLUCOSE BLDC GLUCOMTR-MCNC: 114 MG/DL — HIGH (ref 70–99)
GLUCOSE BLDC GLUCOMTR-MCNC: 141 MG/DL — HIGH (ref 70–99)
GLUCOSE BLDC GLUCOMTR-MCNC: 147 MG/DL — HIGH (ref 70–99)
GLUCOSE SERPL-MCNC: 113 MG/DL — SIGNIFICANT CHANGE UP (ref 70–115)
HCT VFR BLD CALC: 27.2 % — LOW (ref 37–47)
HGB BLD-MCNC: 8.7 G/DL — LOW (ref 12–16)
LDH SERPL L TO P-CCNC: 328 U/L — HIGH (ref 98–192)
MAGNESIUM SERPL-MCNC: 1.7 MG/DL — SIGNIFICANT CHANGE UP (ref 1.6–2.6)
MCHC RBC-ENTMCNC: 26.9 PG — LOW (ref 27–31)
MCHC RBC-ENTMCNC: 32 G/DL — SIGNIFICANT CHANGE UP (ref 32–36)
MCV RBC AUTO: 84 FL — SIGNIFICANT CHANGE UP (ref 81–99)
PHOSPHATE SERPL-MCNC: 3.6 MG/DL — SIGNIFICANT CHANGE UP (ref 2.4–4.7)
PLATELET # BLD AUTO: 118 K/UL — LOW (ref 150–400)
POTASSIUM SERPL-MCNC: 4.6 MMOL/L — SIGNIFICANT CHANGE UP (ref 3.5–5.3)
POTASSIUM SERPL-SCNC: 4.6 MMOL/L — SIGNIFICANT CHANGE UP (ref 3.5–5.3)
RBC # BLD: 3.24 M/UL — LOW (ref 4.4–5.2)
RBC # FLD: 14.9 % — SIGNIFICANT CHANGE UP (ref 11–15.6)
SODIUM SERPL-SCNC: 134 MMOL/L — LOW (ref 135–145)
WBC # BLD: 3.3 K/UL — LOW (ref 4.8–10.8)
WBC # FLD AUTO: 3.3 K/UL — LOW (ref 4.8–10.8)

## 2018-01-04 PROCEDURE — 99233 SBSQ HOSP IP/OBS HIGH 50: CPT

## 2018-01-04 RX ORDER — ESCITALOPRAM OXALATE 10 MG/1
5 TABLET, FILM COATED ORAL DAILY
Qty: 0 | Refills: 0 | Status: DISCONTINUED | OUTPATIENT
Start: 2018-01-04 | End: 2018-01-05

## 2018-01-04 RX ADMIN — PANTOPRAZOLE SODIUM 40 MILLIGRAM(S): 20 TABLET, DELAYED RELEASE ORAL at 06:07

## 2018-01-04 RX ADMIN — MAGNESIUM OXIDE 400 MG ORAL TABLET 400 MILLIGRAM(S): 241.3 TABLET ORAL at 12:57

## 2018-01-04 RX ADMIN — MAGNESIUM OXIDE 400 MG ORAL TABLET 400 MILLIGRAM(S): 241.3 TABLET ORAL at 08:39

## 2018-01-04 RX ADMIN — MAGNESIUM OXIDE 400 MG ORAL TABLET 400 MILLIGRAM(S): 241.3 TABLET ORAL at 17:37

## 2018-01-04 RX ADMIN — LOSARTAN POTASSIUM 100 MILLIGRAM(S): 100 TABLET, FILM COATED ORAL at 06:07

## 2018-01-04 RX ADMIN — Medication 81 MILLIGRAM(S): at 12:57

## 2018-01-04 RX ADMIN — Medication 650 MILLIGRAM(S): at 15:58

## 2018-01-04 RX ADMIN — ERTAPENEM SODIUM 120 MILLIGRAM(S): 1 INJECTION, POWDER, LYOPHILIZED, FOR SOLUTION INTRAMUSCULAR; INTRAVENOUS at 15:04

## 2018-01-04 RX ADMIN — MONTELUKAST 10 MILLIGRAM(S): 4 TABLET, CHEWABLE ORAL at 12:57

## 2018-01-04 RX ADMIN — Medication 325 MILLIGRAM(S): at 12:57

## 2018-01-04 RX ADMIN — ENOXAPARIN SODIUM 40 MILLIGRAM(S): 100 INJECTION SUBCUTANEOUS at 12:57

## 2018-01-04 RX ADMIN — Medication 500 MILLIGRAM(S): at 12:57

## 2018-01-04 RX ADMIN — ESCITALOPRAM OXALATE 5 MILLIGRAM(S): 10 TABLET, FILM COATED ORAL at 15:03

## 2018-01-04 NOTE — PROGRESS NOTE ADULT - ASSESSMENT
68 y/o female with hypoglycemia, hypomagnesemia, HTN, DM II, chronic elevation of transaminases and c/o RLQ abdominal pain, N/V/F with constipation, admitted for hypoglycemia, symptomatic, now resolved.  No new complaints.  Pt states that she has not taken any diabetes medication in the past month ever since last episode of hypoglycemia 1 month ago.  Still awaiting call back from family member (daughter).  Per [facility] Pt filled:  In June- Tujeo 33u qhs. In Nov- Victoza 1.2 mg qd, Farxiga 5mg qd, Metformin ER 2g qd.  On 12/30 she has no further episodes of hypoglycemia. I will need to review the meds with her and her daughter prior to discharge.  On 12/31 Patient had an episode of vomiting, with hypomagnesemia, and positive urine culture.  1/1/2018 patient seen by ID Add ertapenem for fever. On 1/2 She was hypoglycemic on prandin which is unususal. Hopefully the discontinuation of prandin resolves hypoglycemia otherwise consider insulinoma evaluation. On 1/3 Patient has had no further episodes of hypoglycemia but continues to spike low grade fevers. On 1/4 she continues to spike fevers, repeat urine studies. Patient educated on proper urine collection technique.

## 2018-01-04 NOTE — PROGRESS NOTE ADULT - PROBLEM SELECTOR PLAN 7
ESBL 10 49 K. Strep Agalactiae >100 K consult ID.   ID evaluation blood cultures pending.  Patient continues on ertapenem.

## 2018-01-04 NOTE — PROGRESS NOTE ADULT - SUBJECTIVE AND OBJECTIVE BOX
SARA CLOUD     Chief Complaint: Patient is a 67y old  Female who presents with a chief complaint of hypoglycemia (31 Dec 2017 14:04)      PAST MEDICAL & SURGICAL HISTORY:  Anemia  Sleep apnea  Myocardial infarction  GERD (gastroesophageal reflux disease)  Dyslipidemia  Hypertension  Diabetes mellitus  H/O right knee surgery: 8/20  S/P cataract extraction: B/L  S/P partial thyroidectomy  S/P tubal ligation: s/p cyst removal of left axilla  S/P cholecystectomy      HPI/OVERNIGHT EVENTS: Patient has less diarrhea she continues spike fevers. Blood cultures negative so far, repeat ua and urine culture/    MEDICATIONS  (STANDING):  ascorbic acid 500 milliGRAM(s) Oral daily  aspirin enteric coated 81 milliGRAM(s) Oral daily  dextrose 5%. 1000 milliLiter(s) (50 mL/Hr) IV Continuous <Continuous>  dextrose 50% Injectable 12.5 Gram(s) IV Push once  dextrose 50% Injectable 25 Gram(s) IV Push once  dextrose 50% Injectable 25 Gram(s) IV Push once  enoxaparin Injectable 40 milliGRAM(s) SubCutaneous daily  ertapenem  IVPB      ertapenem  IVPB 1000 milliGRAM(s) IV Intermittent every 24 hours  escitalopram 5 milliGRAM(s) Oral daily  ferrous    sulfate 325 milliGRAM(s) Oral daily  influenza   Vaccine 0.5 milliLiter(s) IntraMuscular once  insulin lispro (HumaLOG) corrective regimen sliding scale   SubCutaneous three times a day before meals  losartan 100 milliGRAM(s) Oral daily  magnesium oxide 400 milliGRAM(s) Oral three times a day with meals  montelukast 10 milliGRAM(s) Oral daily  pantoprazole    Tablet 40 milliGRAM(s) Oral before breakfast      Vital Signs Last 24 Hrs  T(C): 37.7 (04 Jan 2018 12:56), Max: 39.4 (03 Jan 2018 22:11)  T(F): 99.9 (04 Jan 2018 12:56), Max: 103 (03 Jan 2018 22:11)  HR: 102 (04 Jan 2018 09:44) (83 - 108)  BP: 119/78 (04 Jan 2018 09:44) (107/65 - 130/80)  BP(mean): --  RR: 18 (04 Jan 2018 09:44) (18 - 18)  SpO2: 94% (04 Jan 2018 09:44) (94% - 98%)    PHYSICAL EXAM:  Constitutional: NAD, well-groomed, well-developed  HEENT: PERRLA, EOMI, Normal Hearing, MMM  Neck: No LAD, No JVD  Back: Normal spine flexure, No CVA tenderness  Respiratory: CTAB Cardiovascular: S1 and S2, RRR, no M/G/R  Gastrointestinal: BS+, soft, NT/ND  Extremities: No peripheral edema  Vascular: 2+ peripheral pulses  Neurological: A/O x 3, no focal deficits  Psychiatric: Normal mood, normal affect  Musculoskeletal: 5/5 strength b/l upper and lower extremities  Skin: No rashes    CAPILLARY BLOOD GLUCOSE    LABS:                        8.7    3.3   )-----------( 118      ( 04 Jan 2018 08:52 )             27.2     01-04    134<L>  |  97<L>  |  17.0  ----------------------------<  113  4.6   |  23.0  |  1.47<H>    Ca    9.0      04 Jan 2018 08:52  Phos  3.6     01-04  Mg     1.7     01-04            RADIOLOGY & ADDITIONAL TESTS:

## 2018-01-05 VITALS — RESPIRATION RATE: 18 BRPM | DIASTOLIC BLOOD PRESSURE: 80 MMHG | TEMPERATURE: 100 F | SYSTOLIC BLOOD PRESSURE: 141 MMHG

## 2018-01-05 LAB
GLUCOSE BLDC GLUCOMTR-MCNC: 112 MG/DL — HIGH (ref 70–99)
GLUCOSE BLDC GLUCOMTR-MCNC: 116 MG/DL — HIGH (ref 70–99)

## 2018-01-05 PROCEDURE — 80074 ACUTE HEPATITIS PANEL: CPT

## 2018-01-05 PROCEDURE — 71046 X-RAY EXAM CHEST 2 VIEWS: CPT

## 2018-01-05 PROCEDURE — 84145 PROCALCITONIN (PCT): CPT

## 2018-01-05 PROCEDURE — 96374 THER/PROPH/DIAG INJ IV PUSH: CPT

## 2018-01-05 PROCEDURE — 85027 COMPLETE CBC AUTOMATED: CPT

## 2018-01-05 PROCEDURE — 84206 ASSAY OF PROINSULIN: CPT

## 2018-01-05 PROCEDURE — 87040 BLOOD CULTURE FOR BACTERIA: CPT

## 2018-01-05 PROCEDURE — 84443 ASSAY THYROID STIM HORMONE: CPT

## 2018-01-05 PROCEDURE — 82728 ASSAY OF FERRITIN: CPT

## 2018-01-05 PROCEDURE — 82272 OCCULT BLD FECES 1-3 TESTS: CPT

## 2018-01-05 PROCEDURE — 87581 M.PNEUMON DNA AMP PROBE: CPT

## 2018-01-05 PROCEDURE — 84480 ASSAY TRIIODOTHYRONINE (T3): CPT

## 2018-01-05 PROCEDURE — 83525 ASSAY OF INSULIN: CPT

## 2018-01-05 PROCEDURE — 71045 X-RAY EXAM CHEST 1 VIEW: CPT

## 2018-01-05 PROCEDURE — 83735 ASSAY OF MAGNESIUM: CPT

## 2018-01-05 PROCEDURE — 74177 CT ABD & PELVIS W/CONTRAST: CPT

## 2018-01-05 PROCEDURE — 82607 VITAMIN B-12: CPT

## 2018-01-05 PROCEDURE — 85652 RBC SED RATE AUTOMATED: CPT

## 2018-01-05 PROCEDURE — 83615 LACTATE (LD) (LDH) ENZYME: CPT

## 2018-01-05 PROCEDURE — T1013: CPT

## 2018-01-05 PROCEDURE — 84436 ASSAY OF TOTAL THYROXINE: CPT

## 2018-01-05 PROCEDURE — 99232 SBSQ HOSP IP/OBS MODERATE 35: CPT

## 2018-01-05 PROCEDURE — 87186 SC STD MICRODIL/AGAR DIL: CPT

## 2018-01-05 PROCEDURE — 85730 THROMBOPLASTIN TIME PARTIAL: CPT

## 2018-01-05 PROCEDURE — 36415 COLL VENOUS BLD VENIPUNCTURE: CPT

## 2018-01-05 PROCEDURE — 99291 CRITICAL CARE FIRST HOUR: CPT | Mod: 25

## 2018-01-05 PROCEDURE — 86140 C-REACTIVE PROTEIN: CPT

## 2018-01-05 PROCEDURE — 87086 URINE CULTURE/COLONY COUNT: CPT

## 2018-01-05 PROCEDURE — 80053 COMPREHEN METABOLIC PANEL: CPT

## 2018-01-05 PROCEDURE — 80048 BASIC METABOLIC PNL TOTAL CA: CPT

## 2018-01-05 PROCEDURE — 86901 BLOOD TYPING SEROLOGIC RH(D): CPT

## 2018-01-05 PROCEDURE — 82962 GLUCOSE BLOOD TEST: CPT

## 2018-01-05 PROCEDURE — 81001 URINALYSIS AUTO W/SCOPE: CPT

## 2018-01-05 PROCEDURE — 82746 ASSAY OF FOLIC ACID SERUM: CPT

## 2018-01-05 PROCEDURE — 82533 TOTAL CORTISOL: CPT

## 2018-01-05 PROCEDURE — 83605 ASSAY OF LACTIC ACID: CPT

## 2018-01-05 PROCEDURE — 85045 AUTOMATED RETICULOCYTE COUNT: CPT

## 2018-01-05 PROCEDURE — 84681 ASSAY OF C-PEPTIDE: CPT

## 2018-01-05 PROCEDURE — 97163 PT EVAL HIGH COMPLEX 45 MIN: CPT

## 2018-01-05 PROCEDURE — 86900 BLOOD TYPING SEROLOGIC ABO: CPT

## 2018-01-05 PROCEDURE — 87486 CHLMYD PNEUM DNA AMP PROBE: CPT

## 2018-01-05 PROCEDURE — 84550 ASSAY OF BLOOD/URIC ACID: CPT

## 2018-01-05 PROCEDURE — 86850 RBC ANTIBODY SCREEN: CPT

## 2018-01-05 PROCEDURE — 87493 C DIFF AMPLIFIED PROBE: CPT

## 2018-01-05 PROCEDURE — 85610 PROTHROMBIN TIME: CPT

## 2018-01-05 PROCEDURE — 99239 HOSP IP/OBS DSCHRG MGMT >30: CPT

## 2018-01-05 PROCEDURE — 71046 X-RAY EXAM CHEST 2 VIEWS: CPT | Mod: 26

## 2018-01-05 PROCEDURE — 83036 HEMOGLOBIN GLYCOSYLATED A1C: CPT

## 2018-01-05 PROCEDURE — 87633 RESP VIRUS 12-25 TARGETS: CPT

## 2018-01-05 PROCEDURE — 84100 ASSAY OF PHOSPHORUS: CPT

## 2018-01-05 PROCEDURE — 87798 DETECT AGENT NOS DNA AMP: CPT

## 2018-01-05 PROCEDURE — 84484 ASSAY OF TROPONIN QUANT: CPT

## 2018-01-05 RX ORDER — ESCITALOPRAM OXALATE 10 MG/1
1 TABLET, FILM COATED ORAL
Qty: 0 | Refills: 0 | COMMUNITY

## 2018-01-05 RX ORDER — ESCITALOPRAM OXALATE 10 MG/1
1 TABLET, FILM COATED ORAL
Qty: 30 | Refills: 0 | OUTPATIENT
Start: 2018-01-05 | End: 2018-02-03

## 2018-01-05 RX ADMIN — LOSARTAN POTASSIUM 100 MILLIGRAM(S): 100 TABLET, FILM COATED ORAL at 05:55

## 2018-01-05 RX ADMIN — ESCITALOPRAM OXALATE 5 MILLIGRAM(S): 10 TABLET, FILM COATED ORAL at 13:24

## 2018-01-05 RX ADMIN — MONTELUKAST 10 MILLIGRAM(S): 4 TABLET, CHEWABLE ORAL at 13:24

## 2018-01-05 RX ADMIN — PANTOPRAZOLE SODIUM 40 MILLIGRAM(S): 20 TABLET, DELAYED RELEASE ORAL at 05:50

## 2018-01-05 RX ADMIN — ENOXAPARIN SODIUM 40 MILLIGRAM(S): 100 INJECTION SUBCUTANEOUS at 13:25

## 2018-01-05 RX ADMIN — Medication 500 MILLIGRAM(S): at 13:24

## 2018-01-05 RX ADMIN — MAGNESIUM OXIDE 400 MG ORAL TABLET 400 MILLIGRAM(S): 241.3 TABLET ORAL at 10:00

## 2018-01-05 RX ADMIN — Medication 325 MILLIGRAM(S): at 13:24

## 2018-01-05 RX ADMIN — Medication 81 MILLIGRAM(S): at 13:24

## 2018-01-05 RX ADMIN — MAGNESIUM OXIDE 400 MG ORAL TABLET 400 MILLIGRAM(S): 241.3 TABLET ORAL at 13:24

## 2018-01-05 RX ADMIN — Medication 650 MILLIGRAM(S): at 00:36

## 2018-01-05 RX ADMIN — ERTAPENEM SODIUM 120 MILLIGRAM(S): 1 INJECTION, POWDER, LYOPHILIZED, FOR SOLUTION INTRAMUSCULAR; INTRAVENOUS at 16:30

## 2018-01-05 NOTE — PROGRESS NOTE ADULT - SUBJECTIVE AND OBJECTIVE BOX
SARA CLOUD     Chief Complaint: Patient is a 67y old  Female who presents with a chief complaint of hypoglycemia (31 Dec 2017 14:04)      PAST MEDICAL & SURGICAL HISTORY:  Anemia  Sleep apnea  Myocardial infarction  GERD (gastroesophageal reflux disease)  Dyslipidemia  Hypertension  Diabetes mellitus  H/O right knee surgery: 8/20  S/P cataract extraction: B/L  S/P partial thyroidectomy  S/P tubal ligation: s/p cyst removal of left axilla  S/P cholecystectomy      HPI/OVERNIGHT EVENTS: No fever for 24 hours, patient completed five days of ertapenem. DC home with outpatient follow up.    MEDICATIONS  (STANDING):  ascorbic acid 500 milliGRAM(s) Oral daily  aspirin enteric coated 81 milliGRAM(s) Oral daily  dextrose 5%. 1000 milliLiter(s) (50 mL/Hr) IV Continuous <Continuous>  dextrose 50% Injectable 12.5 Gram(s) IV Push once  dextrose 50% Injectable 25 Gram(s) IV Push once  dextrose 50% Injectable 25 Gram(s) IV Push once  enoxaparin Injectable 40 milliGRAM(s) SubCutaneous daily  ertapenem  IVPB      ertapenem  IVPB 1000 milliGRAM(s) IV Intermittent every 24 hours  escitalopram 5 milliGRAM(s) Oral daily  ferrous    sulfate 325 milliGRAM(s) Oral daily  influenza   Vaccine 0.5 milliLiter(s) IntraMuscular once  insulin lispro (HumaLOG) corrective regimen sliding scale   SubCutaneous three times a day before meals  losartan 100 milliGRAM(s) Oral daily  magnesium oxide 400 milliGRAM(s) Oral three times a day with meals  montelukast 10 milliGRAM(s) Oral daily  pantoprazole    Tablet 40 milliGRAM(s) Oral before breakfast      Vital Signs Last 24 Hrs  T(C): 37.5 (05 Jan 2018 08:40), Max: 39.2 (04 Jan 2018 16:57)  T(F): 99.5 (05 Jan 2018 08:40), Max: 102.5 (04 Jan 2018 16:57)  HR: 100 (05 Jan 2018 08:40) (100 - 106)  BP: 124/78 (05 Jan 2018 08:40) (106/63 - 151/82)  BP(mean): --  RR: 18 (05 Jan 2018 08:40) (18 - 18)  SpO2: 98% (05 Jan 2018 08:40) (96% - 98%)    PHYSICAL EXAM:  Constitutional: NAD, well-groomed, well-developed  HEENT: PERRLA, EOMI, Normal Hearing, MMM  Neck: No LAD, No JVD  Back: Normal spine flexure, No CVA tenderness  Respiratory: CTAB Cardiovascular: S1 and S2, RRR, no M/G/R  Gastrointestinal: BS+, soft, NT/ND  Extremities: No peripheral edema  Vascular: 2+ peripheral pulses  Neurological: A/O x 3, no focal deficits  Psychiatric: Normal mood, normal affect  Musculoskeletal: 5/5 strength b/l upper and lower extremities  Skin: No rashes    CAPILLARY BLOOD GLUCOSE    LABS:                        8.7    3.3   )-----------( 118      ( 04 Jan 2018 08:52 )             27.2     01-04    134<L>  |  97<L>  |  17.0  ----------------------------<  113  4.6   |  23.0  |  1.47<H>    Ca    9.0      04 Jan 2018 08:52  Phos  3.6     01-04  Mg     1.7     01-04            RADIOLOGY & ADDITIONAL TESTS:

## 2018-01-05 NOTE — PROGRESS NOTE ADULT - PROVIDER SPECIALTY LIST ADULT
Heme/Onc
Heme/Onc
Hospitalist
Infectious Disease
Infectious Disease
Hospitalist
Hospitalist

## 2018-01-05 NOTE — PROGRESS NOTE ADULT - PROBLEM SELECTOR PLAN 1
- treated with 5 days of Ertapenem  - still with fevers; CHECK CXR, CHECK RVP  - IF DIARRHEA CONTINUES, CONSIDER WORKUP

## 2018-01-05 NOTE — PROGRESS NOTE ADULT - PROBLEM SELECTOR PROBLEM 3
Febrile illness, acute
Essential hypertension
Febrile illness, acute
Essential hypertension

## 2018-01-05 NOTE — PROGRESS NOTE ADULT - PROBLEM SELECTOR PLAN 9
ID input appreciated   Start ertapenem, follow up cultures.  ID input appreciated, blood culture negative.  Discharge home and follow up as an outpatient.

## 2018-01-05 NOTE — PROGRESS NOTE ADULT - ASSESSMENT
This 67 y.o. f with DM here for hypoglycemia, now with dysuria.  Admission Urine cx with strep agalactiae, and ESBL E coli,  treated for possible UTI      STILL with persistence of fever.

## 2018-01-05 NOTE — PROGRESS NOTE ADULT - SUBJECTIVE AND OBJECTIVE BOX
[facility]  INFECTIOUS DISEASES AND INTERNAL MEDICINE at [place]  =======================================================  Raymon Elliott MD [facility]   Kit Ortiz MD  Diplomates American Board of Internal Medicine and Infectious Diseases  =======================================================    CLOUD, SARA [id number]  chief complaint: follow up on UTI and fevers    patient seen and examined in follow up.    still with fevers,  new cough.   blood cx sent 1/2/18- negative  reports resolution of dysuria.  feels DIZZY    labs reviewed.   new renal dysfunction     =======================================================  Allergies:  No Known Allergies      =======================================================  MEDICATIONS  (STANDING):  ascorbic acid 500 milliGRAM(s) Oral daily  aspirin enteric coated 81 milliGRAM(s) Oral daily  dextrose 5%. 1000 milliLiter(s) (50 mL/Hr) IV Continuous <Continuous>  dextrose 50% Injectable 12.5 Gram(s) IV Push once  dextrose 50% Injectable 25 Gram(s) IV Push once  dextrose 50% Injectable 25 Gram(s) IV Push once  enoxaparin Injectable 40 milliGRAM(s) SubCutaneous daily  ertapenem  IVPB      ertapenem  IVPB 1000 milliGRAM(s) IV Intermittent every 24 hours  escitalopram 5 milliGRAM(s) Oral daily  ferrous    sulfate 325 milliGRAM(s) Oral daily  influenza   Vaccine 0.5 milliLiter(s) IntraMuscular once  insulin lispro (HumaLOG) corrective regimen sliding scale   SubCutaneous three times a day before meals  losartan 100 milliGRAM(s) Oral daily  magnesium oxide 400 milliGRAM(s) Oral three times a day with meals  montelukast 10 milliGRAM(s) Oral daily  pantoprazole    Tablet 40 milliGRAM(s) Oral before breakfast      =======================================================     REVIEW OF SYSTEMS:  CONSTITUTIONAL:  No Fever or chills  HEENT:   No diplopia or blurred vision.  No earache, sore throat or runny nose.  CARDIOVASCULAR:  No pressure, squeezing, strangling, tightness, heaviness or aching about the chest, neck, axilla or epigastrium.  RESPIRATORY:  POSITIVE cough.  GASTROINTESTINAL:  No nausea, vomiting or diarrhea.  GENITOURINARY:  RESOLVED DYSURIA  MUSCULOSKELETAL:  no joint aches, no muscle pain  SKIN:  No change in skin, hair or nails.  NEUROLOGIC:  No paresthesias, fasciculations, seizures or weakness.  PSYCHIATRIC:  No disorder of thought or mood.  ENDOCRINE:  No heat or cold intolerance, polyuria or polydipsia.  HEMATOLOGICAL:  No easy bruising or bleeding.     =======================================================    Physical Exam:    Vital Signs Last 24 Hrs  T(C): 37.5 (05 Jan 2018 08:40), Max: 39.2 (04 Jan 2018 16:57)  T(F): 99.5 (05 Jan 2018 08:40), Max: 102.5 (04 Jan 2018 16:57)  HR: 100 (05 Jan 2018 08:40) (100 - 106)  BP: 124/78 (05 Jan 2018 08:40) (106/63 - 151/82)  BP(mean): --  RR: 18 (05 Jan 2018 08:40) (18 - 18)  SpO2: 98% (05 Jan 2018 08:40) (96% - 98%)    GEN: NAD, pleasant  HEENT: normocephalic and atraumatic. EOMI. AMELIA.    NECK: Supple. No carotid bruits.  No lymphadenopathy or thyromegaly.  LUNGS: fair air entry; diminished at bases.   HEART: Regular rate and rhythm without murmur.  ABDOMEN: Soft, nontender, and nondistended.  Positive bowel sounds.    : No CVA tenderness  EXTREMITIES: Without any cyanosis, clubbing, rash, lesions or edema.  MSK: no joint swelling  NEUROLOGIC: Cranial nerves II through XII are grossly intact.  PSYCHIATRIC: Appropriate affect .  SKIN: No ulceration or induration present.  WARM TO TOUCH    =======================================================         Labs:  01-04    134<L>  |  97<L>  |  17.0  ----------------------------<  113  4.6   |  23.0  |  1.47<H>    Ca    9.0      04 Jan 2018 08:52  Phos  3.6     01-04  Mg     1.7     01-04                            8.7    3.3   )-----------( 118      ( 04 Jan 2018 08:52 )             27.2                 CAPILLARY BLOOD GLUCOSE      POCT Blood Glucose.: 116 mg/dL (05 Jan 2018 08:14)  POCT Blood Glucose.: 100 mg/dL (04 Jan 2018 21:40)  POCT Blood Glucose.: 141 mg/dL (04 Jan 2018 16:35)  POCT Blood Glucose.: 147 mg/dL (04 Jan 2018 11:44)        RECENT CULTURES:  01-02 @ 16:02 .Blood Blood     No growth at 48 hours        01-02 @ 16:01 .Blood Blood     No growth at 48 hours        12-29 @ 19:54          NotDete  12-29 @ 05:19 .Urine Clean Catch (Midstream) Escherichia coli ESBL    10,000 - 49,000 CFU/mL Escherichia coli ESBL  >100,000 CFU/ml Streptococcus agalactiae (Group B)  .  TYPE: (C=Critical, N=Notification, A=Abnormal) c  TESTS:  _ E. coli ESBL  DATE/TIME CALLED: _ 12/31/2017 12:57:40  CALLED TO: _ jeronimo luna rn  READ BACK (2 Patient Identifiers)(Y/N): _ y  READ BACK VALUES (Y/N): _ y  CALLED BY: Danny blount welge  copy to  pt log

## 2018-01-05 NOTE — PROGRESS NOTE ADULT - PROBLEM SELECTOR PLAN 5
chronically elevated but in the setting of abdominal pain and abnormal US previously, will check CT Abd w/ contrast.  Possibly may follow with GI as outpatient  Will reinforce lifestyle modifications and consider Nutrition consult

## 2018-01-05 NOTE — PROGRESS NOTE ADULT - PROBLEM SELECTOR PROBLEM 6
Pancytopenia

## 2018-01-05 NOTE — PROGRESS NOTE ADULT - PROBLEM SELECTOR PLAN 3
Stable - continue Losartan
WORKUP IN PROCESS
Stable - continue Losartan
WORKUP IN PROCESS
Stable - continue Losartan

## 2018-01-05 NOTE — PHYSICAL THERAPY INITIAL EVALUATION ADULT - ADDITIONAL COMMENTS
pt states she [place] two sons [place] with 7 +7 steps to enter (+left rail). sons work nights. daughter in law home during the day. independent with all mobility without device prior to admit.

## 2018-01-05 NOTE — PROGRESS NOTE ADULT - PROBLEM SELECTOR PROBLEM 4
Type 2 diabetes mellitus with complication, without long-term current use of insulin

## 2018-01-05 NOTE — PROGRESS NOTE ADULT - PROBLEM SELECTOR PLAN 2
Magnesium supplemented, repeat labs pending  Possibly secondary to decreased PO intake. On 12/31 we continued to aggressively replace magnesium. On 1/2 Magnesium levels continue to be low. 1/3 magnesium normalized.
as above
Magnesium supplemented, repeat labs pending  Possibly 2/2 decreased PO intake
Magnesium supplemented, repeat labs pending  Possibly secondary to decreased PO intake. On 12/31 we continued to aggressively replace magnesium. On 1/2 Magnesium levels continue to be low.
Magnesium supplemented, repeat labs pending  Possibly secondary to decreased PO intake. On 12/31 we continued to aggressively replace magnesium. On 1/2 Magnesium levels continue to be low. 1/3 magnesium normalized.
Magnesium supplemented, repeat labs pending  Possibly secondary to decreased PO intake. On 12/31 we continued to aggressively replace magnesium. On 1/2 Magnesium levels continue to be low. 1/3 magnesium normalized.
Magnesium supplemented, repeat labs pending  Possibly 2/2 decreased PO intake
Magnesium supplemented, repeat labs pending  Possibly secondary to decreased PO intake. On 12/31 we continued to aggressively replace magnesium.
Magnesium supplemented, repeat labs pending  Possibly secondary to decreased PO intake. On 12/31 we continued to aggressively replace magnesium.

## 2018-01-05 NOTE — PROGRESS NOTE ADULT - ASSESSMENT
66 y/o female with hypoglycemia, hypomagnesemia, HTN, DM II, chronic elevation of transaminases and c/o RLQ abdominal pain, N/V/F with constipation, admitted for hypoglycemia, symptomatic, now resolved.  No new complaints.  Pt states that she has not taken any diabetes medication in the past month ever since last episode of hypoglycemia 1 month ago.  Still awaiting call back from family member (daughter).  Per [facility] Pt filled:  In June- Tujeo 33u qhs. In Nov- Victoza 1.2 mg qd, Farxiga 5mg qd, Metformin ER 2g qd.  On 12/30 she has no further episodes of hypoglycemia. I will need to review the meds with her and her daughter prior to discharge.  On 12/31 Patient had an episode of vomiting, with hypomagnesemia, and positive urine culture.  1/1/2018 patient seen by ID Add ertapenem for fever. On 1/2 She was hypoglycemic on prandin which is unususal. Hopefully the discontinuation of prandin resolves hypoglycemia otherwise consider insulinoma evaluation. On 1/3 Patient has had no further episodes of hypoglycemia but continues to spike low grade fevers. On 1/4 she continues to spike fevers, repeat urine studies. Patient educated on proper urine collection technique.  1/5 No further hypoglycemia off all hypoglycemic agents. Follow up with ID as an outpatient.

## 2018-01-05 NOTE — PROGRESS NOTE ADULT - PROBLEM SELECTOR PLAN 6
Clinically stable but pt reports some constitutional sx and pt with known liver disease  No e/o malignancy, metastatic disease, or myelofibrosis; no recent travel  possibly 2/2 to an unspecified viral syndrome  -will review medication list as well as inquire daughter; about over-the-counter medications, health supplements, and home or folk remedies  -b12, folate level pending; retic ct, uric acid, PT/PTT pending
Clinically stable but pt reports some constitutional sx and pt with known liver disease  No e/o malignancy, metastatic disease, or myelofibrosis; no recent travel  possibly 2/2 to an unspecified viral syndrome  -will review medication list as well as inquire daughter; about over-the-counter medications, health supplements, and home or folk remedies  -b12, folate level pending; retic ct, uric acid, PT/PTT pending
Clinically stable but pt reports some constitutional sx and pt with known liver disease  No e/o malignancy, metastatic disease, or myelofibrosis; no recent travel  possibly 2/2 to an unspecified viral syndrome  -will review medication list as well as inquire daughter; about over-the-counter medications, health supplements, and home or folk remedies  1/3
Clinically stable but pt reports some constitutional sx and pt with known liver disease  No e/o malignancy, metastatic disease, or myelofibrosis; no recent travel  possibly 2/2 to an unspecified viral syndrome  -will review medication list as well as inquire daughter; about over-the-counter medications, health supplements, and home or folk remedies  -b12, folate level pending; retic ct, uric acid, PT/PTT pending
Clinically stable but pt reports some constitutional sx and pt with known liver disease  No e/o malignancy, metastatic disease, or myelofibrosis; no recent travel  possibly 2/2 to an unspecified viral syndrome  -will review medication list as well as inquire daughter; about over-the-counter medications, health supplements, and home or folk remedies  -b12, folate level pending; retic ct, uric acid, PT/PTT pending
Clinically stable but pt reports some constitutional sx and pt with known liver disease  No e/o malignancy, metastatic disease, or myelofibrosis; no recent travel  possibly 2/2 to an unspecified viral syndrome  -will review medication list as well as inquire daughter; about over-the-counter medications, health supplements, and home or folk remedies  1/3
Clinically stable but pt reports some constitutional sx and pt with known liver disease  No e/o malignancy, metastatic disease, or myelofibrosis; no recent travel  possibly 2/2 to an unspecified viral syndrome  -will review medication list as well as inquire daughter; about over-the-counter medications, health supplements, and home or folk remedies  1/3
Clinically stable but pt reports some constitutional sx and pt with known liver disease  No e/o malignancy, metastatic disease, or myelofibrosis; no recent travel  possibly 2/2 to an unspecified viral syndrome  -will review medication list as well as inquire daughter; about over-the-counter medications, health supplements, and home or folk remedies  -b12, folate level pending; retic ct, uric acid, PT/PTT pending

## 2018-01-05 NOTE — PROGRESS NOTE ADULT - PROBLEM SELECTOR PROBLEM 1
Hypoglycemia
Hypoglycemia
Infection due to extended-spectrum beta-lactamase-producing Escherichia coli
Hypoglycemia
Infection due to extended-spectrum beta-lactamase-producing Escherichia coli
Hypoglycemia
Hypoglycemia

## 2018-01-05 NOTE — PROGRESS NOTE ADULT - PROBLEM SELECTOR PROBLEM 2
Acute cystitis without hematuria
Acute cystitis without hematuria
Hypomagnesemia

## 2018-01-05 NOTE — PROGRESS NOTE ADULT - PROBLEM SELECTOR PROBLEM 5
Elevated transaminase level

## 2018-01-08 ENCOUNTER — INPATIENT (INPATIENT)
Facility: HOSPITAL | Age: 68
LOS: 9 days | DRG: 974 | End: 2018-01-18
Attending: INTERNAL MEDICINE | Admitting: FAMILY MEDICINE
Payer: COMMERCIAL

## 2018-01-08 VITALS
DIASTOLIC BLOOD PRESSURE: 66 MMHG | TEMPERATURE: 98 F | WEIGHT: 128.09 LBS | SYSTOLIC BLOOD PRESSURE: 101 MMHG | OXYGEN SATURATION: 100 % | HEIGHT: 55 IN | HEART RATE: 87 BPM | RESPIRATION RATE: 18 BRPM

## 2018-01-08 LAB
ALBUMIN SERPL ELPH-MCNC: 3.6 G/DL — SIGNIFICANT CHANGE UP (ref 3.3–5.2)
ALP SERPL-CCNC: 229 U/L — HIGH (ref 40–120)
ALT FLD-CCNC: 45 U/L — HIGH
ANION GAP SERPL CALC-SCNC: 18 MMOL/L — HIGH (ref 5–17)
APPEARANCE UR: CLEAR — SIGNIFICANT CHANGE UP
AST SERPL-CCNC: 147 U/L — HIGH
BASOPHILS # BLD AUTO: 0 K/UL — SIGNIFICANT CHANGE UP (ref 0–0.2)
BASOPHILS NFR BLD AUTO: 0.2 % — SIGNIFICANT CHANGE UP (ref 0–2)
BILIRUB SERPL-MCNC: 0.5 MG/DL — SIGNIFICANT CHANGE UP (ref 0.4–2)
BILIRUB UR-MCNC: NEGATIVE — SIGNIFICANT CHANGE UP
BUN SERPL-MCNC: 23 MG/DL — HIGH (ref 8–20)
CALCIUM SERPL-MCNC: 9.1 MG/DL — SIGNIFICANT CHANGE UP (ref 8.6–10.2)
CHLORIDE SERPL-SCNC: 95 MMOL/L — LOW (ref 98–107)
CO2 SERPL-SCNC: 19 MMOL/L — LOW (ref 22–29)
COLOR SPEC: YELLOW — SIGNIFICANT CHANGE UP
CREAT SERPL-MCNC: 1.11 MG/DL — SIGNIFICANT CHANGE UP (ref 0.5–1.3)
DIFF PNL FLD: ABNORMAL
EOSINOPHIL # BLD AUTO: 0.1 K/UL — SIGNIFICANT CHANGE UP (ref 0–0.5)
EOSINOPHIL NFR BLD AUTO: 1.7 % — SIGNIFICANT CHANGE UP (ref 0–6)
GLUCOSE SERPL-MCNC: 146 MG/DL — HIGH (ref 70–115)
GLUCOSE UR QL: NEGATIVE MG/DL — SIGNIFICANT CHANGE UP
HCT VFR BLD CALC: 28.2 % — LOW (ref 37–47)
HGB BLD-MCNC: 9.3 G/DL — LOW (ref 12–16)
KETONES UR-MCNC: NEGATIVE — SIGNIFICANT CHANGE UP
LACTATE BLDV-MCNC: 0.9 MMOL/L — SIGNIFICANT CHANGE UP (ref 0.5–2)
LACTATE BLDV-MCNC: 2.7 MMOL/L — HIGH (ref 0.5–2)
LEUKOCYTE ESTERASE UR-ACNC: ABNORMAL
LYMPHOCYTES # BLD AUTO: 0.6 K/UL — LOW (ref 1–4.8)
LYMPHOCYTES # BLD AUTO: 11.4 % — LOW (ref 20–55)
MAGNESIUM SERPL-MCNC: 1.2 MG/DL — LOW (ref 1.8–2.6)
MCHC RBC-ENTMCNC: 26.8 PG — LOW (ref 27–31)
MCHC RBC-ENTMCNC: 33 G/DL — SIGNIFICANT CHANGE UP (ref 32–36)
MCV RBC AUTO: 81.3 FL — SIGNIFICANT CHANGE UP (ref 81–99)
MONOCYTES # BLD AUTO: 0.2 K/UL — SIGNIFICANT CHANGE UP (ref 0–0.8)
MONOCYTES NFR BLD AUTO: 3.1 % — SIGNIFICANT CHANGE UP (ref 3–10)
NEUTROPHILS # BLD AUTO: 4 K/UL — SIGNIFICANT CHANGE UP (ref 1.8–8)
NEUTROPHILS NFR BLD AUTO: 83.2 % — HIGH (ref 37–73)
NITRITE UR-MCNC: NEGATIVE — SIGNIFICANT CHANGE UP
PH UR: 6 — SIGNIFICANT CHANGE UP (ref 5–8)
PHOSPHATE SERPL-MCNC: 4.6 MG/DL — SIGNIFICANT CHANGE UP (ref 2.4–4.7)
PLATELET # BLD AUTO: 110 K/UL — LOW (ref 150–400)
POTASSIUM SERPL-MCNC: 4.6 MMOL/L — SIGNIFICANT CHANGE UP (ref 3.5–5.3)
POTASSIUM SERPL-SCNC: 4.6 MMOL/L — SIGNIFICANT CHANGE UP (ref 3.5–5.3)
PROINSULIN SERPL-MCNC: 11.2 PMOL/L — HIGH (ref 0–10)
PROT SERPL-MCNC: 6.6 G/DL — SIGNIFICANT CHANGE UP (ref 6.6–8.7)
PROT UR-MCNC: 30 MG/DL
RBC # BLD: 3.47 M/UL — LOW (ref 4.4–5.2)
RBC # FLD: 14.7 % — SIGNIFICANT CHANGE UP (ref 11–15.6)
SODIUM SERPL-SCNC: 132 MMOL/L — LOW (ref 135–145)
SP GR SPEC: 1.02 — SIGNIFICANT CHANGE UP (ref 1.01–1.02)
UROBILINOGEN FLD QL: NEGATIVE MG/DL — SIGNIFICANT CHANGE UP
WBC # BLD: 4.8 K/UL — SIGNIFICANT CHANGE UP (ref 4.8–10.8)
WBC # FLD AUTO: 4.8 K/UL — SIGNIFICANT CHANGE UP (ref 4.8–10.8)

## 2018-01-08 PROCEDURE — 99285 EMERGENCY DEPT VISIT HI MDM: CPT

## 2018-01-08 PROCEDURE — 74177 CT ABD & PELVIS W/CONTRAST: CPT | Mod: 26

## 2018-01-08 RX ORDER — SODIUM CHLORIDE 9 MG/ML
1000 INJECTION INTRAMUSCULAR; INTRAVENOUS; SUBCUTANEOUS ONCE
Qty: 0 | Refills: 0 | Status: COMPLETED | OUTPATIENT
Start: 2018-01-08 | End: 2018-01-08

## 2018-01-08 RX ORDER — ERTAPENEM SODIUM 1 G/1
1000 INJECTION, POWDER, LYOPHILIZED, FOR SOLUTION INTRAMUSCULAR; INTRAVENOUS ONCE
Qty: 0 | Refills: 0 | Status: COMPLETED | OUTPATIENT
Start: 2018-01-08 | End: 2018-01-08

## 2018-01-08 RX ORDER — IBUPROFEN 200 MG
600 TABLET ORAL ONCE
Qty: 0 | Refills: 0 | Status: COMPLETED | OUTPATIENT
Start: 2018-01-08 | End: 2018-01-08

## 2018-01-08 RX ORDER — ACETAMINOPHEN 500 MG
650 TABLET ORAL ONCE
Qty: 0 | Refills: 0 | Status: COMPLETED | OUTPATIENT
Start: 2018-01-08 | End: 2018-01-08

## 2018-01-08 RX ADMIN — Medication 600 MILLIGRAM(S): at 22:00

## 2018-01-08 RX ADMIN — SODIUM CHLORIDE 9999 MILLILITER(S): 9 INJECTION INTRAMUSCULAR; INTRAVENOUS; SUBCUTANEOUS at 19:29

## 2018-01-08 RX ADMIN — Medication 600 MILLIGRAM(S): at 22:05

## 2018-01-08 RX ADMIN — ERTAPENEM SODIUM 120 MILLIGRAM(S): 1 INJECTION, POWDER, LYOPHILIZED, FOR SOLUTION INTRAMUSCULAR; INTRAVENOUS at 19:29

## 2018-01-08 RX ADMIN — Medication 650 MILLIGRAM(S): at 20:05

## 2018-01-08 RX ADMIN — SODIUM CHLORIDE 1000 MILLILITER(S): 9 INJECTION INTRAMUSCULAR; INTRAVENOUS; SUBCUTANEOUS at 17:16

## 2018-01-08 NOTE — ED PROVIDER NOTE - OBJECTIVE STATEMENT
This patient is a 67 year old woman with hx of DM, HTN, and Anemia sent to the ER by her PMD Dr. Ryder with reports of abdominal pain, diarrhea, fever and weightloss.  Patient was recently discharged from [facility] 3 days ago for hypoglycemia.  Patient c/o 1 month of abdominal pain and diarrhea and 15 days of dysuria and fever.  Temperature at home today reported to be 101.  Patient denies sick contacts and recent travel.  Her family is concerned because she is very weak. This patient is a 67 year old woman with hx of DM, HTN, and Anemia sent to the ER by her PMD Dr. Ryder with reports of abdominal pain, diarrhea, fever and weight loss.  Patient was recently discharged from [facility] 3 days ago for hypoglycemia.  Patient c/o 1 month of abdominal pain and diarrhea and 15 days of dysuria and fever.  Temperature at home today reported to be 101.  Patient denies sick contacts and recent travel.  Her family is concerned because she is very weak.

## 2018-01-08 NOTE — ED ADULT NURSE NOTE - OBJECTIVE STATEMENT
pt arrived with abd pain, nausea, vomiting and diarrhea for a couple of days as per family pt was just discharged from hospital 3 days ago with hypoglycemia, pt states having abd pain 7/10

## 2018-01-08 NOTE — ED STATDOCS - PROGRESS NOTE DETAILS
66 y/o F pt with a pmhx of presents to the ED c/o diarrhea, vomiting, fever, decrease in appetite and cough, recent weight loss and oral ulcers. Discharge from [facility] for hypoglycemia. States that she has had diarrhea x15 days. Describes 8 episodes of diarrhea per day and 2 episodes of vomiting per day. Recent weight loss is unknown. Highest fever at 101F. Dr. Joiner sent her into the ED for evaluation of hypotension.   On PE: Non toxic appearing. NAD. Lungs are clear bilaterally. Hearts is normal, regular rate. Moist mm.   Language Services was utilized in obtaining the H & P and throughout the duration of the patient's care. (Galud). Focused eval, protocol orders entered. Pt to be moved to main ED for complete evaluation by another provider.

## 2018-01-08 NOTE — ED PROVIDER NOTE - CARE PLAN
Principal Discharge DX:	Dehydration  Secondary Diagnosis:	Sepsis, due to unspecified organism  Secondary Diagnosis:	Diarrhea, unspecified type

## 2018-01-08 NOTE — ED ADULT NURSE REASSESSMENT NOTE - NS ED NURSE REASSESS COMMENT FT1
iv placed labs sent, stool and urine sent, iv fluids hung will continue to montior
pt with rectal temp er md dr angeles inform, code sepsis called
as per aid pt placed in yellow gown refused to to give her  belonging to staff

## 2018-01-08 NOTE — ED PROVIDER NOTE - MEDICAL DECISION MAKING DETAILS
Abdominal pain, diarrhea, fever, weight loss and weakness - will check Labs, UA, EKG, check CT, Orthostatics and provide IVF.

## 2018-01-08 NOTE — ED ADULT TRIAGE NOTE - CHIEF COMPLAINT QUOTE
pt arrive from doctors off with reports of continued diarrhea and vomiting since last Wednesday. pt feeling weak, fevers, and weight loss.

## 2018-01-08 NOTE — ED CLERICAL - NS ED CLERK UNITS
2GUL/mon mon iso/2GUL 2BRK/any bed 2BRK/ISO any bed 2BRK/any 2BRK/2405-01 2BRK/bed dirty sorry :-( 2BRK/[id number]

## 2018-01-08 NOTE — ED PROVIDER NOTE - PROGRESS NOTE DETAILS
As per sign-out from Dr. Thomas patient with chronic diarrhea presenting to dehydration s/p recent discharge from [facility].

## 2018-01-09 DIAGNOSIS — R69 ILLNESS, UNSPECIFIED: ICD-10-CM

## 2018-01-09 DIAGNOSIS — A09 INFECTIOUS GASTROENTERITIS AND COLITIS, UNSPECIFIED: ICD-10-CM

## 2018-01-09 DIAGNOSIS — E86.0 DEHYDRATION: ICD-10-CM

## 2018-01-09 DIAGNOSIS — D61.818 OTHER PANCYTOPENIA: ICD-10-CM

## 2018-01-09 DIAGNOSIS — B37.0 CANDIDAL STOMATITIS: ICD-10-CM

## 2018-01-09 DIAGNOSIS — E83.42 HYPOMAGNESEMIA: ICD-10-CM

## 2018-01-09 DIAGNOSIS — E78.5 HYPERLIPIDEMIA, UNSPECIFIED: ICD-10-CM

## 2018-01-09 DIAGNOSIS — A41.9 SEPSIS, UNSPECIFIED ORGANISM: ICD-10-CM

## 2018-01-09 DIAGNOSIS — E87.1 HYPO-OSMOLALITY AND HYPONATREMIA: ICD-10-CM

## 2018-01-09 DIAGNOSIS — R74.0 NONSPECIFIC ELEVATION OF LEVELS OF TRANSAMINASE AND LACTIC ACID DEHYDROGENASE [LDH]: ICD-10-CM

## 2018-01-09 DIAGNOSIS — E11.9 TYPE 2 DIABETES MELLITUS WITHOUT COMPLICATIONS: ICD-10-CM

## 2018-01-09 DIAGNOSIS — Z21 ASYMPTOMATIC HUMAN IMMUNODEFICIENCY VIRUS [HIV] INFECTION STATUS: ICD-10-CM

## 2018-01-09 DIAGNOSIS — I10 ESSENTIAL (PRIMARY) HYPERTENSION: ICD-10-CM

## 2018-01-09 LAB
C DIFF BY PCR RESULT: SIGNIFICANT CHANGE UP
C DIFF TOX GENS STL QL NAA+PROBE: SIGNIFICANT CHANGE UP
CULTURE RESULTS: SIGNIFICANT CHANGE UP
GLUCOSE BLDC GLUCOMTR-MCNC: 103 MG/DL — HIGH (ref 70–99)
GLUCOSE BLDC GLUCOMTR-MCNC: 136 MG/DL — HIGH (ref 70–99)
GLUCOSE BLDC GLUCOMTR-MCNC: 94 MG/DL — SIGNIFICANT CHANGE UP (ref 70–99)
HIV 1 & 2 AB SERPL IA.RAPID: SIGNIFICANT CHANGE UP
OB PNL STL: NEGATIVE — SIGNIFICANT CHANGE UP
SPECIMEN SOURCE: SIGNIFICANT CHANGE UP

## 2018-01-09 PROCEDURE — 99223 1ST HOSP IP/OBS HIGH 75: CPT

## 2018-01-09 PROCEDURE — 93010 ELECTROCARDIOGRAM REPORT: CPT

## 2018-01-09 PROCEDURE — 76705 ECHO EXAM OF ABDOMEN: CPT | Mod: 26

## 2018-01-09 RX ORDER — MAGNESIUM SULFATE 500 MG/ML
2 VIAL (ML) INJECTION ONCE
Qty: 0 | Refills: 0 | Status: COMPLETED | OUTPATIENT
Start: 2018-01-09 | End: 2018-01-09

## 2018-01-09 RX ORDER — METRONIDAZOLE 500 MG
500 TABLET ORAL EVERY 8 HOURS
Qty: 0 | Refills: 0 | Status: DISCONTINUED | OUTPATIENT
Start: 2018-01-09 | End: 2018-01-09

## 2018-01-09 RX ORDER — MONTELUKAST 4 MG/1
1 TABLET, CHEWABLE ORAL
Qty: 0 | Refills: 0 | COMMUNITY

## 2018-01-09 RX ORDER — SODIUM CHLORIDE 9 MG/ML
1000 INJECTION INTRAMUSCULAR; INTRAVENOUS; SUBCUTANEOUS
Qty: 0 | Refills: 0 | Status: DISCONTINUED | OUTPATIENT
Start: 2018-01-09 | End: 2018-01-11

## 2018-01-09 RX ORDER — INSULIN LISPRO 100/ML
VIAL (ML) SUBCUTANEOUS AT BEDTIME
Qty: 0 | Refills: 0 | Status: DISCONTINUED | OUTPATIENT
Start: 2018-01-09 | End: 2018-01-16

## 2018-01-09 RX ORDER — DEXTROSE 50 % IN WATER 50 %
25 SYRINGE (ML) INTRAVENOUS ONCE
Qty: 0 | Refills: 0 | Status: DISCONTINUED | OUTPATIENT
Start: 2018-01-09 | End: 2018-01-18

## 2018-01-09 RX ORDER — ASPIRIN/CALCIUM CARB/MAGNESIUM 324 MG
1 TABLET ORAL
Qty: 0 | Refills: 0 | COMMUNITY

## 2018-01-09 RX ORDER — LOSARTAN/HYDROCHLOROTHIAZIDE 100MG-25MG
1 TABLET ORAL
Qty: 0 | Refills: 0 | COMMUNITY

## 2018-01-09 RX ORDER — VANCOMYCIN HCL 1 G
125 VIAL (EA) INTRAVENOUS EVERY 6 HOURS
Qty: 0 | Refills: 0 | Status: DISCONTINUED | OUTPATIENT
Start: 2018-01-09 | End: 2018-01-09

## 2018-01-09 RX ORDER — DEXTROSE 50 % IN WATER 50 %
12.5 SYRINGE (ML) INTRAVENOUS ONCE
Qty: 0 | Refills: 0 | Status: DISCONTINUED | OUTPATIENT
Start: 2018-01-09 | End: 2018-01-18

## 2018-01-09 RX ORDER — GLUCAGON INJECTION, SOLUTION 0.5 MG/.1ML
1 INJECTION, SOLUTION SUBCUTANEOUS ONCE
Qty: 0 | Refills: 0 | Status: DISCONTINUED | OUTPATIENT
Start: 2018-01-09 | End: 2018-01-18

## 2018-01-09 RX ORDER — METRONIDAZOLE 500 MG
TABLET ORAL
Qty: 0 | Refills: 0 | Status: DISCONTINUED | OUTPATIENT
Start: 2018-01-09 | End: 2018-01-09

## 2018-01-09 RX ORDER — ERTAPENEM SODIUM 1 G/1
1000 INJECTION, POWDER, LYOPHILIZED, FOR SOLUTION INTRAMUSCULAR; INTRAVENOUS EVERY 24 HOURS
Qty: 0 | Refills: 0 | Status: DISCONTINUED | OUTPATIENT
Start: 2018-01-09 | End: 2018-01-09

## 2018-01-09 RX ORDER — SODIUM CHLORIDE 9 MG/ML
1000 INJECTION, SOLUTION INTRAVENOUS
Qty: 0 | Refills: 0 | Status: DISCONTINUED | OUTPATIENT
Start: 2018-01-09 | End: 2018-01-18

## 2018-01-09 RX ORDER — ACETAMINOPHEN 500 MG
650 TABLET ORAL EVERY 6 HOURS
Qty: 0 | Refills: 0 | Status: DISCONTINUED | OUTPATIENT
Start: 2018-01-09 | End: 2018-01-10

## 2018-01-09 RX ORDER — CLOTRIMAZOLE 10 MG
1 TROCHE MUCOUS MEMBRANE
Qty: 0 | Refills: 0 | Status: DISCONTINUED | OUTPATIENT
Start: 2018-01-09 | End: 2018-01-17

## 2018-01-09 RX ORDER — METRONIDAZOLE 500 MG
500 TABLET ORAL ONCE
Qty: 0 | Refills: 0 | Status: COMPLETED | OUTPATIENT
Start: 2018-01-09 | End: 2018-01-09

## 2018-01-09 RX ORDER — TRAMADOL HYDROCHLORIDE 50 MG/1
0 TABLET ORAL
Qty: 0 | Refills: 0 | COMMUNITY

## 2018-01-09 RX ORDER — MORPHINE SULFATE 50 MG/1
2 CAPSULE, EXTENDED RELEASE ORAL EVERY 6 HOURS
Qty: 0 | Refills: 0 | Status: DISCONTINUED | OUTPATIENT
Start: 2018-01-09 | End: 2018-01-16

## 2018-01-09 RX ORDER — INSULIN LISPRO 100/ML
VIAL (ML) SUBCUTANEOUS
Qty: 0 | Refills: 0 | Status: DISCONTINUED | OUTPATIENT
Start: 2018-01-09 | End: 2018-01-16

## 2018-01-09 RX ORDER — ENOXAPARIN SODIUM 100 MG/ML
40 INJECTION SUBCUTANEOUS DAILY
Qty: 0 | Refills: 0 | Status: DISCONTINUED | OUTPATIENT
Start: 2018-01-09 | End: 2018-01-11

## 2018-01-09 RX ORDER — DEXTROSE 50 % IN WATER 50 %
1 SYRINGE (ML) INTRAVENOUS ONCE
Qty: 0 | Refills: 0 | Status: DISCONTINUED | OUTPATIENT
Start: 2018-01-09 | End: 2018-01-18

## 2018-01-09 RX ADMIN — Medication 125 MILLIGRAM(S): at 12:13

## 2018-01-09 RX ADMIN — Medication 100 MILLIGRAM(S): at 14:23

## 2018-01-09 RX ADMIN — Medication 100 MILLIGRAM(S): at 05:23

## 2018-01-09 RX ADMIN — SODIUM CHLORIDE 100 MILLILITER(S): 9 INJECTION INTRAMUSCULAR; INTRAVENOUS; SUBCUTANEOUS at 05:23

## 2018-01-09 RX ADMIN — Medication 1 LOZENGE: at 10:41

## 2018-01-09 RX ADMIN — ENOXAPARIN SODIUM 40 MILLIGRAM(S): 100 INJECTION SUBCUTANEOUS at 12:12

## 2018-01-09 RX ADMIN — Medication 125 MILLIGRAM(S): at 06:31

## 2018-01-09 RX ADMIN — Medication 1 LOZENGE: at 21:43

## 2018-01-09 RX ADMIN — SODIUM CHLORIDE 100 MILLILITER(S): 9 INJECTION INTRAMUSCULAR; INTRAVENOUS; SUBCUTANEOUS at 12:13

## 2018-01-09 RX ADMIN — Medication 650 MILLIGRAM(S): at 07:49

## 2018-01-09 RX ADMIN — Medication 1 LOZENGE: at 14:23

## 2018-01-09 RX ADMIN — Medication 50 GRAM(S): at 06:31

## 2018-01-09 NOTE — CONSULT NOTE ADULT - SUBJECTIVE AND OBJECTIVE BOX
[facility]  INFECTIOUS DISEASES AND INTERNAL MEDICINE at [place]  =======================================================  Raymon Ortiz MD  Diplomates American Board of Internal Medicine and Infectious Diseases  =======================================================      [id number]  SARA CLOUD  Seen with      CC: Patient is a 67y old  Female who presents with a chief complaint of abdominal pain fever and diarrhea (2018 05:07)    68y/o  Female with h/o DM, HTN referred to the ER by her PMD for diarrhea and abdominal pain for he past 10 days. Patient was recently discharged from [facility] on 18 after being treated for ESBL E coli UTI with IV Ertapenem for 5 days. While in the Hospital patient was having diarrhea and work up was done to r/o C diff and C diff was negative on a prior admission. Patient has continued to have diarrhea post discharge associated with poor appetite. patient was seen by her PMD yesterday and was noted to be hypotensive so he sent her to the ER. In the ER patient was febrile to 102F. Patient had a CT A/P reporting liver cyst possibly echinococcus. Also was started on treatment for C Diff with PO Vancomycin and IV Flagyl. ID input requested.       Past Medical & Surgical Hx:  Anemia  Sleep apnea  Myocardial infarction  GERD (gastroesophageal reflux disease)  Dyslipidemia  Hypertension  Diabetes mellitus  H/O right knee surgery:   S/P cataract extraction: B/L  S/P partial thyroidectomy  S/P tubal ligation: s/p cyst removal of left axilla  S/P cholecystectomy      Social Hx:  Denies smoking, ETOH or drug use      FAMILY HISTORY:  No pertinent family history in first degree relatives      Allergies  No Known Allergies      Antibiotics:  metroNIDAZOLE  IVPB 500 milliGRAM(s) IV Intermittent every 8 hours  vancomycin    Solution 125 milliGRAM(s) Oral every 6 hours       REVIEW OF SYSTEMS:  CONSTITUTIONAL:  No Fever or chills  HEENT:  No diplopia or blurred vision.  No earache, sore throat or runny nose.  CARDIOVASCULAR:  No pressure, squeezing, strangling, tightness, heaviness or aching about the chest, neck, axilla or epigastrium.  RESPIRATORY:  No cough, shortness of breath  GASTROINTESTINAL:  + diarrhea.  GENITOURINARY:  No dysuria, frequency or urgency. No Blood in urine  MUSCULOSKELETAL:  no joint aches, no muscle pain  SKIN:  No change in skin, hair or nails.  NEUROLOGIC:  No paresthesias, fasciculations  PSYCHIATRIC:  No disorder of thought or mood.  ENDOCRINE:  No heat or cold intolerance  HEMATOLOGICAL:  No easy bruising or bleeding.       Physical Exam:  Vital Signs Last 24 Hrs  T(C): 37.1 (2018 12:21), Max: 39.4 (2018 07:30)  T(F): 98.7 (2018 12:21), Max: 102.9 (2018 07:30)  HR: 101 (2018 07:30) (82 - 110)  BP: 151/71 (2018 07:30) (104/57 - 151/71)  RR: 23 (2018 07:30) (18 - 23)  SpO2: 98% (2018 07:30) (97% - 99%)      GEN: NAD, pleasant  HEENT: normocephalic and atraumatic. EOMI. PERRL.    NECK: Supple.   LUNGS: Clear to auscultation.  HEART: Regular rate and rhythm  ABDOMEN: Soft, nontender, and nondistended.  Positive bowel sounds.    : No CVA tenderness  EXTREMITIES: Without any edema.  MSK: No joint swelling  NEUROLOGIC: Cranial nerves II through XII are grossly intact.  PSYCHIATRIC: Appropriate affect .  SKIN: No rash      Labs:      132<L>  |  95<L>  |  23.0<H>  ----------------------------<  146<H>  4.6   |  19.0<L>  |  1.11    Ca    9.1      2018 17:34  Phos  4.6       Mg     1.2         TPro  6.6  /  Alb  3.6  /  TBili  0.5  /  DBili  x   /  AST  147<H>  /  ALT  45<H>  /  AlkPhos  229<H>                 9.3    4.8   )-----------( 110      ( 2018 17:34 )             28.2     Urinalysis Basic - ( 2018 17:54 )    Color: Yellow / Appearance: Clear / S.020 / pH: x  Gluc: x / Ketone: Negative  / Bili: Negative / Urobili: Negative mg/dL   Blood: x / Protein: 30 mg/dL / Nitrite: Negative   Leuk Esterase: Moderate / RBC: 0-2 /HPF / WBC 3-5   Sq Epi: x / Non Sq Epi: Occasional / Bacteria: Moderate      LIVER FUNCTIONS - ( 2018 17:34 )  Alb: 3.6 g/dL / Pro: 6.6 g/dL / ALK PHOS: 229 U/L / ALT: 45 U/L / AST: 147 U/L / GGT: x             RECENT CULTURES:   @ 17:48 .Stool Feces     Culture in progress       @ 16:02 .Blood Blood     No growth at 5 days.       @ 16:01 .Blood Blood     No growth at 5 days.       @ 05:19 .Urine Clean Catch (Midstream) Escherichia coli ESBL    10,000 - 49,000 CFU/mL Escherichia coli ESBL  >100,000 CFU/ml Streptococcus agalactiae (Group B)        EXAM:  US ABDOMEN LIMITED                        PROCEDURE DATE:  2018    INTERPRETATION:  CLINICAL INFORMATION: Hepatic cyst, long-standing right   upper quadrant pain, nausea and diarrhea  COMPARISON: Ultrasound dated 2017  TECHNIQUE: Sonography of the right upper quadrant.   FINDINGS:  Liver: Increased echogenicity and measures 19.1 cm in length. Calcified   left hepatic lobe cyst measures 9 mm. Calcified right hepatic lobe cyst   measures 1.1 cm.  Bile ducts: Normal caliber. Common bile duct measures 5.5 mm.   Gallbladder: Status post cholecystectomy      Pancreas: Visualized portions are within normal limits.  Right kidney: 10.1 cm. No hydronephrosis.      Ascites: None.  IVC: Visualized portions are within normal limits.  IMPRESSION:   Fatty liver. Calcified hepatic cysts      EXAM:  CT ABDOMEN AND PELVIS OC IC                        PROCEDURE DATE:  2018    INTERPRETATION:  Contrast enhanced CT of the abdomen and pelvis .  COMPARISON: 2017 normal CT scan.  CLINICAL HISTORY:    .  Technique: contiguous axial images were obtained with 2.5 mm slice   thickness after intravenous and oral contrast administration.  Coronal and sagittal reformats were also submitted for interpretation.  100 ml of Omnipaque were injected intravenously, and 0 ml were discarded,   without complications noted.   FINDINGS:   A tree-in-bud pattern of opacity seen within the periphery of right lower   lobe likely infectious in etiology.  There is no free intra-abdominal air or ascites.  Multiple lung small subhepatic abscess with a peripheral calcifications   noted in the right and left hepatic lobes likely indicating a   echinococcus cysts. Left hepatic lobe cyst measures 1.1 cm and the right   hepatic lobe cysts measure 1.1 and 0.9 cm of. No other lesions seen. Is mild hepatomegaly.  The, spleen, pancreas, adrenal glands, gallbladder are normal.  There is no intra or extrahepatic biliary ductal dilatation.  The stomach, duodenum, small and large bowel and appendix are within normal limits.  Both kidneys show normal uptake of contrast media without masses or hydronephrosis.    The urinary bladder shows normal morphology and contour.  The reproductive organs are within normal limits.   There are no retroperitoneal masses or abnormal lymphadenopathy.  The retroperitoneal vessels are normal.    There is degenerative spondylosis of thoracal lumbar spine with a   posterior disc ridge complexes@the L3-L4 T12-L1 and T11-T12 disc level   causing mild central canal stenosis .   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

## 2018-01-09 NOTE — PROGRESS NOTE ADULT - PROBLEM SELECTOR PLAN 1
CT findings per radiology consistent with echinoccoal infection.   Abdominal US pending to evaluate cyst characteristics  Serology pending  ID consult pending

## 2018-01-09 NOTE — CONSULT NOTE ADULT - PROBLEM SELECTOR RECOMMENDATION 9
Do Stool PCR, Stool culture, Stool O&P, Stool AFB, stool Giardia Ag  C Diff is negative  D/C antibiotics

## 2018-01-09 NOTE — H&P ADULT - ASSESSMENT
66 y/o female with diarrhea, sepsis, thrush, pancytopenia, hypomagnesemia, hyponatremia. elevated transaminases, possible Echinococcus

## 2018-01-09 NOTE — PROGRESS NOTE ADULT - SUBJECTIVE AND OBJECTIVE BOX
SARA CLOUD    [id number]    67y      Female    INTERVAL HPI/OVERNIGHT EVENTS: Via bedside  [place]. States that she is still feeling nauseous and has diarrhea.    Hospital course;  66 yo F with h/o DM, GERD, HTN, HLD presents with worsening abdominal pain and fevers. States that these symptoms have been ongoing for many years. She reports that she is originally from [facility] and migrated to [place] one year ago. 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 Rapid HIV ab is positive.       REVIEW OF SYSTEMS:    CONSTITUTIONAL: No fever   RESPIRATORY: No cough   CARDIOVASCULAR: No chest pain     Vital Signs Last 24 Hrs  T(C): 37.8 (2018 09:49), Max: 39.4 (2018 07:30)  T(F): 100 (2018 09:49), Max: 102.9 (2018 07:30)  HR: 101 (2018 07:30) (82 - 110)  BP: 151/71 (2018 07:30) (96/64 - 151/71)  BP(mean): --  RR: 23 (2018 07:30) (16 - 23)  SpO2: 98% (2018 07:30) (96% - 100%)    PHYSICAL EXAM:    GENERAL: mildly toxic appearing, pleasant, well groomed  HEENT: PERRL, +EOMI, +oral ulcers  CHEST/LUNG: Clear to percussion bilaterally   HEART: S1S2+   ABDOMEN: Soft, Nontender, Nondistended; Bowel sounds present    LABS:                        9.3    4.8   )-----------( 110      ( 2018 17:34 )             28.2     08    132<L>  |  95<L>  |  23.0<H>  ----------------------------<  146<H>  4.6   |  19.0<L>  |  1.11    Ca    9.1      2018 17:34  Phos  4.6       Mg     1.2         TPro  6.6  /  Alb  3.6  /  TBili  0.5  /  DBili  x   /  AST  147<H>  /  ALT  45<H>  /  AlkPhos  229<H>        Urinalysis Basic - ( 2018 17:54 )    Color: Yellow / Appearance: Clear / S.020 / pH: x  Gluc: x / Ketone: Negative  / Bili: Negative / Urobili: Negative mg/dL   Blood: x / Protein: 30 mg/dL / Nitrite: Negative   Leuk Esterase: Moderate / RBC: 0-2 /HPF / WBC 3-5   Sq Epi: x / Non Sq Epi: Occasional / Bacteria: Moderate          MEDICATIONS  (STANDING):  clotrimazole Lozenge 1 Lozenge Oral five times a day  enoxaparin Injectable 40 milliGRAM(s) SubCutaneous daily  ertapenem  IVPB 1000 milliGRAM(s) IV Intermittent every 24 hours  metroNIDAZOLE  IVPB      metroNIDAZOLE  IVPB 500 milliGRAM(s) IV Intermittent every 8 hours  sodium chloride 0.9%. 1000 milliLiter(s) (100 mL/Hr) IV Continuous <Continuous>  vancomycin    Solution 125 milliGRAM(s) Oral every 6 hours    MEDICATIONS  (PRN):  acetaminophen   Tablet 650 milliGRAM(s) Oral every 6 hours PRN For Temp greater than 38 C (100.4 F)  aluminum hydroxide/magnesium hydroxide/simethicone Suspension 30 milliLiter(s) Oral every 4 hours PRN Dyspepsia  morphine  - Injectable 2 milliGRAM(s) IV Push every 6 hours PRN Moderate Pain (4 - 6)      RADIOLOGY & ADDITIONAL TESTS:

## 2018-01-09 NOTE — H&P ADULT - HISTORY OF PRESENT ILLNESS
66 y/o female with h/o DM (diet controlled, HTN (on no medications), pancytopenia, elevated transaminases, was referred to the Er by PCP because of fever, diarrhea and abdominal pain. patient advised that the watery diarrhea and nausea are on for the last 3 weeks. thrush for the last 3 days. abdominal pain is mainly in the RLQ. fever for the last 2 weeks. Ct abdomen showed multiple liver calcified lesions look like Echinococcal infestation. Labs showed hypomagnesemia and increasingly elevated transaminases. patient accepted to test for HIV (diarrhea, thrush and pancytopenia).

## 2018-01-10 DIAGNOSIS — R50.9 FEVER, UNSPECIFIED: ICD-10-CM

## 2018-01-10 DIAGNOSIS — R19.7 DIARRHEA, UNSPECIFIED: ICD-10-CM

## 2018-01-10 DIAGNOSIS — D50.0 IRON DEFICIENCY ANEMIA SECONDARY TO BLOOD LOSS (CHRONIC): ICD-10-CM

## 2018-01-10 LAB
ALBUMIN SERPL ELPH-MCNC: 2.6 G/DL — LOW (ref 3.3–5.2)
ALBUMIN SERPL ELPH-MCNC: 2.8 G/DL — LOW (ref 3.3–5.2)
ALP SERPL-CCNC: 146 U/L — HIGH (ref 40–120)
ALP SERPL-CCNC: 156 U/L — HIGH (ref 40–120)
ALT FLD-CCNC: 37 U/L — HIGH
ALT FLD-CCNC: 39 U/L — HIGH
ANION GAP SERPL CALC-SCNC: 14 MMOL/L — SIGNIFICANT CHANGE UP (ref 5–17)
ANION GAP SERPL CALC-SCNC: 16 MMOL/L — SIGNIFICANT CHANGE UP (ref 5–17)
ANISOCYTOSIS BLD QL: SLIGHT — SIGNIFICANT CHANGE UP
APPEARANCE UR: CLEAR — SIGNIFICANT CHANGE UP
AST SERPL-CCNC: 171 U/L — HIGH
AST SERPL-CCNC: 175 U/L — HIGH
BASOPHILS # BLD AUTO: 0 K/UL — SIGNIFICANT CHANGE UP (ref 0–0.2)
BASOPHILS NFR BLD AUTO: 0.3 % — SIGNIFICANT CHANGE UP (ref 0–2)
BILIRUB SERPL-MCNC: 0.5 MG/DL — SIGNIFICANT CHANGE UP (ref 0.4–2)
BILIRUB SERPL-MCNC: 0.6 MG/DL — SIGNIFICANT CHANGE UP (ref 0.4–2)
BILIRUB UR-MCNC: NEGATIVE — SIGNIFICANT CHANGE UP
BLD GP AB SCN SERPL QL: SIGNIFICANT CHANGE UP
BUN SERPL-MCNC: 12 MG/DL — SIGNIFICANT CHANGE UP (ref 8–20)
BUN SERPL-MCNC: 13 MG/DL — SIGNIFICANT CHANGE UP (ref 8–20)
CALCIUM SERPL-MCNC: 8 MG/DL — LOW (ref 8.6–10.2)
CALCIUM SERPL-MCNC: 8.1 MG/DL — LOW (ref 8.6–10.2)
CHLORIDE SERPL-SCNC: 103 MMOL/L — SIGNIFICANT CHANGE UP (ref 98–107)
CHLORIDE SERPL-SCNC: 103 MMOL/L — SIGNIFICANT CHANGE UP (ref 98–107)
CHOLEST SERPL-MCNC: 71 MG/DL — LOW (ref 110–199)
CO2 SERPL-SCNC: 16 MMOL/L — LOW (ref 22–29)
CO2 SERPL-SCNC: 17 MMOL/L — LOW (ref 22–29)
COLOR SPEC: YELLOW — SIGNIFICANT CHANGE UP
CREAT SERPL-MCNC: 0.73 MG/DL — SIGNIFICANT CHANGE UP (ref 0.5–1.3)
CREAT SERPL-MCNC: 0.77 MG/DL — SIGNIFICANT CHANGE UP (ref 0.5–1.3)
CULTURE RESULTS: SIGNIFICANT CHANGE UP
DIFF PNL FLD: ABNORMAL
ELLIPTOCYTES BLD QL SMEAR: SLIGHT — SIGNIFICANT CHANGE UP
EOSINOPHIL # BLD AUTO: 0 K/UL — SIGNIFICANT CHANGE UP (ref 0–0.5)
EOSINOPHIL # BLD AUTO: 0 K/UL — SIGNIFICANT CHANGE UP (ref 0–0.5)
EOSINOPHIL NFR BLD AUTO: 0 % — SIGNIFICANT CHANGE UP (ref 0–6)
EOSINOPHIL NFR BLD AUTO: 1 % — SIGNIFICANT CHANGE UP (ref 0–5)
EPI CELLS # UR: SIGNIFICANT CHANGE UP
GLUCOSE BLDC GLUCOMTR-MCNC: 104 MG/DL — HIGH (ref 70–99)
GLUCOSE BLDC GLUCOMTR-MCNC: 110 MG/DL — HIGH (ref 70–99)
GLUCOSE BLDC GLUCOMTR-MCNC: 110 MG/DL — HIGH (ref 70–99)
GLUCOSE BLDC GLUCOMTR-MCNC: 143 MG/DL — HIGH (ref 70–99)
GLUCOSE BLDC GLUCOMTR-MCNC: 152 MG/DL — HIGH (ref 70–99)
GLUCOSE SERPL-MCNC: 114 MG/DL — SIGNIFICANT CHANGE UP (ref 70–115)
GLUCOSE SERPL-MCNC: 97 MG/DL — SIGNIFICANT CHANGE UP (ref 70–115)
GLUCOSE UR QL: NEGATIVE MG/DL — SIGNIFICANT CHANGE UP
HBA1C BLD-MCNC: 5.3 % — SIGNIFICANT CHANGE UP (ref 4–5.6)
HCT VFR BLD CALC: 21 % — CRITICAL LOW (ref 37–47)
HCT VFR BLD CALC: 22.9 % — LOW (ref 37–47)
HCT VFR BLD CALC: 22.9 % — LOW (ref 37–47)
HDLC SERPL-MCNC: 5 MG/DL — LOW
HGB BLD-MCNC: 7.1 G/DL — LOW (ref 12–16)
HGB BLD-MCNC: 7.6 G/DL — LOW (ref 12–16)
HGB BLD-MCNC: 7.6 G/DL — LOW (ref 12–16)
HIV 1+2 AB+HIV1 P24 AG SERPL QL IA: REACTIVE
HIV1+2 AB SPEC QL: ABNORMAL
HIV1+2 AB SPEC QL: SIGNIFICANT CHANGE UP
HYPOCHROMIA BLD QL: SLIGHT — SIGNIFICANT CHANGE UP
INR BLD: 1.28 RATIO — HIGH (ref 0.88–1.16)
KETONES UR-MCNC: NEGATIVE — SIGNIFICANT CHANGE UP
LACTATE BLDV-MCNC: 1.1 MMOL/L — SIGNIFICANT CHANGE UP (ref 0.5–2)
LEUKOCYTE ESTERASE UR-ACNC: ABNORMAL
LIPID PNL WITH DIRECT LDL SERPL: 19 MG/DL — SIGNIFICANT CHANGE UP
LYMPHOCYTES # BLD AUTO: 0.4 K/UL — LOW (ref 1–4.8)
LYMPHOCYTES # BLD AUTO: 0.5 K/UL — LOW (ref 1–4.8)
LYMPHOCYTES # BLD AUTO: 10 % — LOW (ref 20–55)
LYMPHOCYTES # BLD AUTO: 14.5 % — LOW (ref 20–55)
MACROCYTES BLD QL: SLIGHT — SIGNIFICANT CHANGE UP
MAGNESIUM SERPL-MCNC: 1.3 MG/DL — LOW (ref 1.6–2.6)
MCHC RBC-ENTMCNC: 26.4 PG — LOW (ref 27–31)
MCHC RBC-ENTMCNC: 26.7 PG — LOW (ref 27–31)
MCHC RBC-ENTMCNC: 27.2 PG — SIGNIFICANT CHANGE UP (ref 27–31)
MCHC RBC-ENTMCNC: 33.2 G/DL — SIGNIFICANT CHANGE UP (ref 32–36)
MCHC RBC-ENTMCNC: 33.2 G/DL — SIGNIFICANT CHANGE UP (ref 32–36)
MCHC RBC-ENTMCNC: 33.8 G/DL — SIGNIFICANT CHANGE UP (ref 32–36)
MCV RBC AUTO: 79.5 FL — LOW (ref 81–99)
MCV RBC AUTO: 80.4 FL — LOW (ref 81–99)
MCV RBC AUTO: 80.5 FL — LOW (ref 81–99)
MICROCYTES BLD QL: SLIGHT — SIGNIFICANT CHANGE UP
MONOCYTES # BLD AUTO: 0 K/UL — SIGNIFICANT CHANGE UP (ref 0–0.8)
MONOCYTES # BLD AUTO: 0.1 K/UL — SIGNIFICANT CHANGE UP (ref 0–0.8)
MONOCYTES NFR BLD AUTO: 2 % — LOW (ref 3–10)
MONOCYTES NFR BLD AUTO: 2.1 % — LOW (ref 3–10)
NEUTROPHILS # BLD AUTO: 2.7 K/UL — SIGNIFICANT CHANGE UP (ref 1.8–8)
NEUTROPHILS # BLD AUTO: 3.2 K/UL — SIGNIFICANT CHANGE UP (ref 1.8–8)
NEUTROPHILS NFR BLD AUTO: 81 % — HIGH (ref 37–73)
NEUTROPHILS NFR BLD AUTO: 82.8 % — HIGH (ref 37–73)
NEUTS BAND # BLD: 6 % — SIGNIFICANT CHANGE UP (ref 0–8)
NITRITE UR-MCNC: NEGATIVE — SIGNIFICANT CHANGE UP
OB PNL STL: POSITIVE
OVALOCYTES BLD QL SMEAR: SLIGHT — SIGNIFICANT CHANGE UP
PH UR: 6 — SIGNIFICANT CHANGE UP (ref 5–8)
PLAT MORPH BLD: NORMAL — SIGNIFICANT CHANGE UP
PLATELET # BLD AUTO: 75 K/UL — LOW (ref 150–400)
PLATELET # BLD AUTO: 75 K/UL — LOW (ref 150–400)
PLATELET # BLD AUTO: 82 K/UL — LOW (ref 150–400)
POIKILOCYTOSIS BLD QL AUTO: SLIGHT — SIGNIFICANT CHANGE UP
POTASSIUM SERPL-MCNC: 3.2 MMOL/L — LOW (ref 3.5–5.3)
POTASSIUM SERPL-MCNC: 3.5 MMOL/L — SIGNIFICANT CHANGE UP (ref 3.5–5.3)
POTASSIUM SERPL-SCNC: 3.2 MMOL/L — LOW (ref 3.5–5.3)
POTASSIUM SERPL-SCNC: 3.5 MMOL/L — SIGNIFICANT CHANGE UP (ref 3.5–5.3)
PROCALCITONIN SERPL-MCNC: 1.74 NG/ML — HIGH (ref 0–0.04)
PROT SERPL-MCNC: 5.2 G/DL — LOW (ref 6.6–8.7)
PROT SERPL-MCNC: 5.4 G/DL — LOW (ref 6.6–8.7)
PROT UR-MCNC: 30 MG/DL
PROTHROM AB SERPL-ACNC: 14.2 SEC — HIGH (ref 9.8–12.7)
RBC # BLD: 2.61 M/UL — LOW (ref 4.4–5.2)
RBC # BLD: 2.85 M/UL — LOW (ref 4.4–5.2)
RBC # BLD: 2.88 M/UL — LOW (ref 4.4–5.2)
RBC # FLD: 14.2 % — SIGNIFICANT CHANGE UP (ref 11–15.6)
RBC # FLD: 14.6 % — SIGNIFICANT CHANGE UP (ref 11–15.6)
RBC # FLD: 14.7 % — SIGNIFICANT CHANGE UP (ref 11–15.6)
RBC BLD AUTO: ABNORMAL
RBC CASTS # UR COMP ASSIST: SIGNIFICANT CHANGE UP /HPF (ref 0–4)
SODIUM SERPL-SCNC: 133 MMOL/L — LOW (ref 135–145)
SODIUM SERPL-SCNC: 136 MMOL/L — SIGNIFICANT CHANGE UP (ref 135–145)
SP GR SPEC: 1.01 — SIGNIFICANT CHANGE UP (ref 1.01–1.02)
SPECIMEN SOURCE: SIGNIFICANT CHANGE UP
TOTAL CHOLESTEROL/HDL RATIO MEASUREMENT: 14 RATIO — HIGH (ref 3.3–7.1)
TRIGL SERPL-MCNC: 234 MG/DL — HIGH (ref 10–200)
TYPE + AB SCN PNL BLD: SIGNIFICANT CHANGE UP
UROBILINOGEN FLD QL: NEGATIVE MG/DL — SIGNIFICANT CHANGE UP
WBC # BLD: 3.3 K/UL — LOW (ref 4.8–10.8)
WBC # BLD: 3.6 K/UL — LOW (ref 4.8–10.8)
WBC # BLD: 3.6 K/UL — LOW (ref 4.8–10.8)
WBC # FLD AUTO: 3.3 K/UL — LOW (ref 4.8–10.8)
WBC # FLD AUTO: 3.6 K/UL — LOW (ref 4.8–10.8)
WBC # FLD AUTO: 3.6 K/UL — LOW (ref 4.8–10.8)
WBC UR QL: SIGNIFICANT CHANGE UP

## 2018-01-10 PROCEDURE — 99233 SBSQ HOSP IP/OBS HIGH 50: CPT

## 2018-01-10 PROCEDURE — 12345: CPT | Mod: NC

## 2018-01-10 PROCEDURE — 71045 X-RAY EXAM CHEST 1 VIEW: CPT | Mod: 26

## 2018-01-10 PROCEDURE — 36000 PLACE NEEDLE IN VEIN: CPT

## 2018-01-10 PROCEDURE — 99223 1ST HOSP IP/OBS HIGH 75: CPT

## 2018-01-10 PROCEDURE — 76937 US GUIDE VASCULAR ACCESS: CPT | Mod: 26

## 2018-01-10 RX ORDER — SODIUM CHLORIDE 9 MG/ML
1000 INJECTION, SOLUTION INTRAVENOUS ONCE
Qty: 0 | Refills: 0 | Status: COMPLETED | OUTPATIENT
Start: 2018-01-10 | End: 2018-01-10

## 2018-01-10 RX ORDER — PIPERACILLIN AND TAZOBACTAM 4; .5 G/20ML; G/20ML
3.38 INJECTION, POWDER, LYOPHILIZED, FOR SOLUTION INTRAVENOUS ONCE
Qty: 0 | Refills: 0 | Status: COMPLETED | OUTPATIENT
Start: 2018-01-10 | End: 2018-01-10

## 2018-01-10 RX ORDER — VANCOMYCIN HCL 1 G
1000 VIAL (EA) INTRAVENOUS EVERY 12 HOURS
Qty: 0 | Refills: 0 | Status: DISCONTINUED | OUTPATIENT
Start: 2018-01-10 | End: 2018-01-10

## 2018-01-10 RX ORDER — POTASSIUM CHLORIDE 20 MEQ
40 PACKET (EA) ORAL ONCE
Qty: 0 | Refills: 0 | Status: COMPLETED | OUTPATIENT
Start: 2018-01-10 | End: 2018-01-10

## 2018-01-10 RX ORDER — IBUPROFEN 200 MG
600 TABLET ORAL EVERY 6 HOURS
Qty: 0 | Refills: 0 | Status: DISCONTINUED | OUTPATIENT
Start: 2018-01-10 | End: 2018-01-12

## 2018-01-10 RX ORDER — MAGNESIUM SULFATE 500 MG/ML
2 VIAL (ML) INJECTION ONCE
Qty: 0 | Refills: 0 | Status: COMPLETED | OUTPATIENT
Start: 2018-01-10 | End: 2018-01-10

## 2018-01-10 RX ORDER — PIPERACILLIN AND TAZOBACTAM 4; .5 G/20ML; G/20ML
3.38 INJECTION, POWDER, LYOPHILIZED, FOR SOLUTION INTRAVENOUS EVERY 8 HOURS
Qty: 0 | Refills: 0 | Status: DISCONTINUED | OUTPATIENT
Start: 2018-01-10 | End: 2018-01-10

## 2018-01-10 RX ADMIN — Medication 50 GRAM(S): at 13:51

## 2018-01-10 RX ADMIN — Medication 1 LOZENGE: at 00:28

## 2018-01-10 RX ADMIN — SODIUM CHLORIDE 2000 MILLILITER(S): 9 INJECTION, SOLUTION INTRAVENOUS at 02:00

## 2018-01-10 RX ADMIN — SODIUM CHLORIDE 100 MILLILITER(S): 9 INJECTION INTRAMUSCULAR; INTRAVENOUS; SUBCUTANEOUS at 14:58

## 2018-01-10 RX ADMIN — SODIUM CHLORIDE 2000 MILLILITER(S): 9 INJECTION, SOLUTION INTRAVENOUS at 02:30

## 2018-01-10 RX ADMIN — Medication 1 LOZENGE: at 17:57

## 2018-01-10 RX ADMIN — PIPERACILLIN AND TAZOBACTAM 200 GRAM(S): 4; .5 INJECTION, POWDER, LYOPHILIZED, FOR SOLUTION INTRAVENOUS at 03:07

## 2018-01-10 RX ADMIN — PIPERACILLIN AND TAZOBACTAM 25 GRAM(S): 4; .5 INJECTION, POWDER, LYOPHILIZED, FOR SOLUTION INTRAVENOUS at 06:54

## 2018-01-10 RX ADMIN — Medication 1 LOZENGE: at 23:38

## 2018-01-10 RX ADMIN — Medication 600 MILLIGRAM(S): at 14:17

## 2018-01-10 RX ADMIN — Medication 1 LOZENGE: at 11:30

## 2018-01-10 RX ADMIN — Medication 600 MILLIGRAM(S): at 23:37

## 2018-01-10 RX ADMIN — Medication 40 MILLIEQUIVALENT(S): at 06:54

## 2018-01-10 RX ADMIN — Medication 250 MILLIGRAM(S): at 05:36

## 2018-01-10 RX ADMIN — Medication 650 MILLIGRAM(S): at 01:12

## 2018-01-10 RX ADMIN — Medication 600 MILLIGRAM(S): at 13:50

## 2018-01-10 RX ADMIN — Medication 650 MILLIGRAM(S): at 07:12

## 2018-01-10 NOTE — PROGRESS NOTE ADULT - SUBJECTIVE AND OBJECTIVE BOX
[facility]  INFECTIOUS DISEASES AND INTERNAL MEDICINE at [place]  =======================================================  Raymon Ortiz MD  Diplomates American Board of Internal Medicine and Infectious Diseases  =======================================================    CLOUD, SARA [id number]    Follow up: Diarrhea    Febrile last night  No complaints    Allergies:  No Known Allergies      Antibiotics:  piperacillin/tazobactam IVPB. 3.375 Gram(s) IV Intermittent every 8 hours  vancomycin  IVPB 1000 milliGRAM(s) IV Intermittent every 12 hours      REVIEW OF SYSTEMS:  CONSTITUTIONAL:  + Fever + chills  HEENT:  No diplopia or blurred vision.  No earache, sore throat or runny nose.  CARDIOVASCULAR:  No pressure, squeezing, strangling, tightness, heaviness or aching about the chest, neck, axilla or epigastrium.  RESPIRATORY:  No cough, shortness of breath  GASTROINTESTINAL:  + diarrhea.  GENITOURINARY:  No dysuria, frequency or urgency. No Blood in urine  MUSCULOSKELETAL:  no joint aches, no muscle pain  SKIN:  No change in skin, hair or nails.  NEUROLOGIC:  No paresthesias, fasciculations  PSYCHIATRIC:  No disorder of thought or mood.  ENDOCRINE:  No heat or cold intolerance  HEMATOLOGICAL:  No easy bruising or bleeding.      Physical Exam:  Vital Signs Last 24 Hrs  T(C): 36.8 (10 Julian 2018 11:10), Max: 39.7 (10 Julian 2018 08:53)  T(F): 98.2 (10 Julian 2018 11:10), Max: 103.5 (10 Julian 2018 08:53)  HR: 110 (10 Julian 2018 08:53) (87 - 110)  BP: 117/67 (10 Julian 2018 08:53) (94/47 - 148/70)  RR: 18 (10 Julian 2018 08:53) (16 - 20)  SpO2: 99% (10 Julian 2018 08:53) (95% - 100%)      GEN: NAD, pleasant  HEENT: normocephalic and atraumatic. EOMI. PERRL.    NECK: Supple.   LUNGS: Clear to auscultation.  HEART: Regular rate and rhythm  ABDOMEN: Soft, nontender, and nondistended.  Positive bowel sounds.    : No CVA tenderness  EXTREMITIES: Without any edema.  MSK: No joint swelling  NEUROLOGIC: Cranial nerves II through XII are grossly intact.  PSYCHIATRIC: Appropriate affect .  SKIN: No rash      Labs:  01-10    136  |  103  |  12.0  ----------------------------<  114  3.5   |  17.0<L>  |  0.77    Ca    8.1<L>      10 Julian 2018 08:28  Phos  4.6       Mg     1.3     01-10    TPro  5.4<L>  /  Alb  2.8<L>  /  TBili  0.6  /  DBili  x   /  AST  171<H>  /  ALT  39<H>  /  AlkPhos  156<H>  01-10                          7.6    3.6   )-----------( 75       ( 10 Julian 2018 10:10 )             22.9       PT/INR - ( 10 Julian 2018 08:28 )   PT: 14.2 sec;   INR: 1.28 ratio        Urinalysis Basic - ( 10 Julian 2018 02:42 )    Color: Yellow / Appearance: Clear / S.015 / pH: x  Gluc: x / Ketone: Negative  / Bili: Negative / Urobili: Negative mg/dL   Blood: x / Protein: 30 mg/dL / Nitrite: Negative   Leuk Esterase: Trace / RBC: 0-2 /HPF / WBC 0-2   Sq Epi: x / Non Sq Epi: Occasional / Bacteria: x      LIVER FUNCTIONS - ( 10 Julian 2018 08:28 )  Alb: 2.8 g/dL / Pro: 5.4 g/dL / ALK PHOS: 156 U/L / ALT: 39 U/L / AST: 171 U/L / GGT: x             RECENT CULTURES:   @ 18:36 .Stool Feces     Testing in progress       @ 11:01 .Stool Feces     No enteric gram negative rods isolated  Culture in progress       @ 17:48 .Stool Feces     Culture in progress       @ 16:02 .Blood Blood     No growth at 5 days.       @ 16:01 .Blood Blood     No growth at 5 days.       @ 19:54    [facility]       @ 05:19 .Urine Clean Catch (Midstream) Escherichia coli ESBL    10,000 - 49,000 CFU/mL Escherichia coli ESBL  >100,000 CFU/ml Streptococcus agalactiae (Group B)      EXAM:  US ABDOMEN LIMITED                        PROCEDURE DATE:  2018    INTERPRETATION:  CLINICAL INFORMATION: Hepatic cyst, long-standing right   upper quadrant pain, nausea and diarrhea  COMPARISON: Ultrasound dated 2017  TECHNIQUE: Sonography of the right upper quadrant.   FINDINGS:  Liver: Increased echogenicity and measures 19.1 cm in length. Calcified   left hepatic lobe cyst measures 9 mm. Calcified right hepatic lobe cyst   measures 1.1 cm.  Bile ducts: Normal caliber. Common bile duct measures 5.5 mm.   Gallbladder: Status post cholecystectomy      Pancreas: Visualized portions are within normal limits.  Right kidney: 10.1 cm. No hydronephrosis.      Ascites: None.  IVC: Visualized portions are within normal limits.  IMPRESSION:   Fatty liver. Calcified hepatic cysts      EXAM:  CT ABDOMEN AND PELVIS OC IC                        PROCEDURE DATE:  2018    INTERPRETATION:  Contrast enhanced CT of the abdomen and pelvis .  COMPARISON: 2017 normal CT scan.  CLINICAL HISTORY:    .  Technique: contiguous axial images were obtained with 2.5 mm slice   thickness after intravenous and oral contrast administration.  Coronal and sagittal reformats were also submitted for interpretation.  100 ml of Omnipaque were injected intravenously, and 0 ml were discarded,   without complications noted.   FINDINGS:   A tree-in-bud pattern of opacity seen within the periphery of right lower   lobe likely infectious in etiology.  There is no free intra-abdominal air or ascites.  Multiple lung small subhepatic abscess with a peripheral calcifications   noted in the right and left hepatic lobes likely indicating a   echinococcus cysts. Left hepatic lobe cyst measures 1.1 cm and the right   hepatic lobe cysts measure 1.1 and 0.9 cm of. No other lesions seen. Is mild hepatomegaly.  The, spleen, pancreas, adrenal glands, gallbladder are normal.  There is no intra or extrahepatic biliary ductal dilatation.  The stomach, duodenum, small and large bowel and appendix are within normal limits.  Both kidneys show normal uptake of contrast media without masses or hydronephrosis.    The urinary bladder shows normal morphology and contour.  The reproductive organs are within normal limits.   There are no retroperitoneal masses or abnormal lymphadenopathy.  The retroperitoneal vessels are normal.    There is degenerative spondylosis of thoracal lumbar spine with a   posterior disc ridge complexes@the L3-L4 T12-L1 and T11-T12 disc level   causing mild central canal stenosis .   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

## 2018-01-10 NOTE — PROVIDER CONTACT NOTE (OTHER) - SITUATION
Pt febrile since she arrived to [address] [place] [address] at 1pm. Pt needs second unit of blood please. Occult positive. Concerned about patient continuing to lose fluids since she is having frequent loose BMs.

## 2018-01-10 NOTE — PROGRESS NOTE ADULT - PROBLEM SELECTOR PLAN 1
CT findings per radiology consistent with echinoccoal infection.   Abdominal US pending to evaluate cyst characteristics  Serology pending  ID consult pending  GI consult

## 2018-01-10 NOTE — PROVIDER CONTACT NOTE (OTHER) - ACTION/TREATMENT ORDERED:
Give blood transfusion and ibuprofen. Hold lovenox until occult back.
Will upgrade to 4 Bracket. Will order second unit of blood.

## 2018-01-10 NOTE — PROCEDURE NOTE - PROCEDURE
<<-----Click on this checkbox to enter Procedure Peripheral intravenous catheter assessment  01/10/2018  18G  1.75"  IV  ns flush good heme back right median vein  Active  JSTEELE  Vascular access with ultrasound guidance  01/10/2018  Patent right median vein  Active  JSTEELE

## 2018-01-10 NOTE — CHART NOTE - NSCHARTNOTEFT_GEN_A_CORE
Rapid response called for fever and tachycardia     68 yo F with h/o DM, GERD, HTN, HLD presented to the ED with worsening abdominal pain and fevers.  Patient was admitted for possible echinococcal infection and also found to be HIV+.  Pt continues to have diarrhea but currently denies any complaints.      Vitals: BP 94/47, , T102.8, RR16, SpO2 98% RA   General: NAD, A&Ox3   HEENT: AT/NC   CV: S1, S2, no murmurs appreciated   Resp: clear bilateral   Abd: soft, NT, ND, +BS   Ext: no b/l calf tenderness   Skin: no ulcers noted on skin exam     Labs:   C. diff - neg   cbc, cmp, mag, phos, blood culture x2, lactate, procalcitonin, ua, urine culture pending     A/P:   68 yo F with h/o DM, GERD, HTN, HLD, now found to be HIV+ and with possible echinococcal infection with sepsis.   2L LR ordered   sepsis workup as per above   chest xray   Vanco/Zosyn  Tylenol for fever   Discussed with Dr. Gonzalez and in agreement with plan.   Discussed plan with RN as well.   Sharron, PGY 3

## 2018-01-10 NOTE — CONSULT NOTE ADULT - SUBJECTIVE AND OBJECTIVE BOX
HPI:  66 y/o female with h/o DM (diet controlled, HTN (on no medications), pancytopenia, elevated transaminases, was referred to the Er by PCP because of fever, diarrhea and abdominal pain. Patient was noted to be HIV positive. She has watery diarrhea and nausea for the last 3 weeks. She is very drowsy and despite using , no meaningful history could be obtained. She is C diff negative. Stool studies are pending. She also has hepatic cysts. She is also being treated for thrush with clotrimazole. Her viral hepatitis testing for hep B and C are negative/    PAST MEDICAL & SURGICAL HISTORY:  Anemia  Sleep apnea  Myocardial infarction  GERD (gastroesophageal reflux disease)  Dyslipidemia  Hypertension  Diabetes mellitus  H/O right knee surgery:   S/P cataract extraction: B/L  S/P partial thyroidectomy  S/P tubal ligation: s/p cyst removal of left axilla  S/P cholecystectomy      ROS:  No Heartburn, regurgitation, dysphagia, odynophagia.  No dyspepsia  No abdominal pain.    No Nausea, vomiting.  No Bleeding.  No hematemesis.   Diarrhea.    No hematochezia  No weight loss, anorexia.  No edema.      MEDICATIONS  (STANDING):  clotrimazole Lozenge 1 Lozenge Oral five times a day  dextrose 5%. 1000 milliLiter(s) (50 mL/Hr) IV Continuous <Continuous>  dextrose 50% Injectable 12.5 Gram(s) IV Push once  dextrose 50% Injectable 25 Gram(s) IV Push once  dextrose 50% Injectable 25 Gram(s) IV Push once  enoxaparin Injectable 40 milliGRAM(s) SubCutaneous daily  insulin lispro (HumaLOG) corrective regimen sliding scale   SubCutaneous three times a day before meals  insulin lispro (HumaLOG) corrective regimen sliding scale   SubCutaneous at bedtime  magnesium sulfate  IVPB 2 Gram(s) IV Intermittent once  sodium chloride 0.9%. 1000 milliLiter(s) (100 mL/Hr) IV Continuous <Continuous>    MEDICATIONS  (PRN):  aluminum hydroxide/magnesium hydroxide/simethicone Suspension 30 milliLiter(s) Oral every 4 hours PRN Dyspepsia  dextrose Gel 1 Dose(s) Oral once PRN Blood Glucose LESS THAN 70 milliGRAM(s)/deciliter  glucagon  Injectable 1 milliGRAM(s) IntraMuscular once PRN Glucose LESS THAN 70 milligrams/deciliter  ibuprofen  Tablet 600 milliGRAM(s) Oral every 6 hours PRN Tmax 100.4  morphine  - Injectable 2 milliGRAM(s) IV Push every 6 hours PRN Moderate Pain (4 - 6)      Allergies    No Known Allergies    Intolerances        SOCIAL HISTORY:Denies x 3    ENDOSCOPIC/GI HISTORY: No EGD or colonoscopy    FAMILY HISTORY:  No pertinent family history in first degree relatives      Vital Signs Last 24 Hrs  T(C): 36.8 (10 Julian 2018 11:10), Max: 39.7 (10 Julian 2018 08:53)  T(F): 98.2 (10 Julian 2018 11:10), Max: 103.5 (10 Julian 2018 08:53)  HR: 110 (10 Julian 2018 08:53) (87 - 110)  BP: 117/67 (10 Julian 2018 08:53) (94/47 - 148/70)  BP(mean): --  RR: 18 (10 Julian 2018 08:53) (16 - 20)  SpO2: 99% (10 Julian 2018 08:53) (95% - 100%)    PHYSICAL EXAM:    GENERAL: NAD, drowsy  HEAD:  Atraumatic, Normocephalic  EYES: EOMI, PERRLA, conjunctiva and sclera clear  ENMT: No tonsillar erythema, exudates, or enlargement; Moist mucous membranes, Good dentition, No lesions  NECK: Supple, No JVD, Normal thyroid  CHEST/LUNG: Clear to percussion bilaterally; No rales, rhonchi, wheezing, or rubs  HEART: Regular rate and rhythm; No murmurs, rubs, or gallops  ABDOMEN: Soft, Nontender, Nondistended; Bowel sounds present  EXTREMITIES:  2+ Peripheral Pulses, No clubbing, cyanosis, or edema  LYMPH: No lymphadenopathy noted  SKIN: No rashes or lesions      LABS:                        7.6    3.6   )-----------( 75       ( 10 Julian 2018 10:10 )             22.9     01-10    136  |  103  |  12.0  ----------------------------<  114  3.5   |  17.0<L>  |  0.77    Ca    8.1<L>      10 Julian 2018 08:28  Phos  4.6     01-08  Mg     1.3     01-10    TPro  5.4<L>  /  Alb  2.8<L>  /  TBili  0.6  /  DBili  x   /  AST  171<H>  /  ALT  39<H>  /  AlkPhos  156<H>  01-10    PT/INR - ( 10 Julian 2018 08:28 )   PT: 14.2 sec;   INR: 1.28 ratio            Urinalysis Basic - ( 10 Julian 2018 02:42 )    Color: Yellow / Appearance: Clear / S.015 / pH: x  Gluc: x / Ketone: Negative  / Bili: Negative / Urobili: Negative mg/dL   Blood: x / Protein: 30 mg/dL / Nitrite: Negative   Leuk Esterase: Trace / RBC: 0-2 /HPF / WBC 0-2   Sq Epi: x / Non Sq Epi: Occasional / Bacteria: x        LIVER FUNCTIONS - ( 10 Julian 2018 08:28 )  Alb: 2.8 g/dL / Pro: 5.4 g/dL / ALK PHOS: 156 U/L / ALT: 39 U/L / AST: 171 U/L / GGT: x             Acute Hepatitis Panel (17 @ 21:25)    Hepatitis C Virus Interpretation: Nonreact: Hepatitis C AB  S/CO Ratio                        Interpretation  < 1.0                                     Non-Reactive  1.0 - 4.9                           Weakly-Reactive  > 5.0                                 Reactive  Non-Reactive: A person witha non-reactive HCV antibody result is  considered uninfected.  No further action is needed unless recent  infection is suspected.  In these cases, consider repeat testing later to  detect seroconversion..  Weakly-Reactive: HCV antibody test is abnormal, HCV RNA Qualitative test  will follow.  Reactive: HCV antibody test is abnormal, HCV RNA Qualitative test will  follow.  Note: HCV antibody testing is performed on the [employer]  system.    Hepatitis C Virus S/CO Ratio: 0.06 S/CO    Hepatitis B Core IgM Antibody: Nonreact    Hepatitis B Surface Antigen: Nonreact    Hepatitis A IgM Antibody: Nonreact      RADIOLOGY & ADDITIONAL STUDIES:  < from: CT Abdomen and Pelvis w/ Oral Cont and w/ IV Cont (18 @ 20:37) >  CLINICAL HISTORY:    .    Technique: contiguous axial images were obtained with 2.5 mm slice   thickness after intravenous and oral contrast administration.  Coronal and sagittal reformats were also submitted for interpretation.    100 ml of Omnipaque were injected intravenously, and 0 ml were discarded,   without complications noted.     FINDINGS:     A tree-in-bud pattern of opacity seen within the periphery of right lower   lobe likely infectious in etiology.  There is no free intra-abdominal air or ascites.  Multiple lung small subhepatic abscess with a peripheral calcifications   noted in the right and left hepatic lobes likely indicating a   echinococcus cysts. Left hepatic lobe cyst measures 1.1 cm and the right   hepatic lobe cysts measure 1.1 and 0.9 cm of. No other lesions seen. Is   mild hepatomegaly.  The, spleen, pancreas, adrenal glands, gallbladder are normal.    There is no intra or extrahepatic biliary ductal dilatation.    The stomach, duodenum, small and large bowel and appendix are within   normal limits.    Both kidneys show normal uptake of contrast media without masses or   hydronephrosis.      The urinary bladder shows normal morphology and contour.    The reproductive organs are within normal limits.       There are no retroperitoneal masses or abnormal lymphadenopathy.  The retroperitoneal vessels are normal.      There is degenerative spondylosis of thoracal lumbar spine with a   posterior disc ridge complexes@the L3-L4 T12-L1 and T11-T12 disc level   causing mild central canal stenosis .   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    < end of copied text >

## 2018-01-10 NOTE — CONSULT NOTE ADULT - ASSESSMENT
Patient with new diagnosed HIV infection, elevated LFTs, oral thrush, pancytopenia, diarrhea -c diff negative.    1. Agree with stool studies. Check stool PCR  2. Monitor LFTs. Check CMV IgM, HSV IgM, VZV igM, EBV IgM. Also check DOUGLAS, anti-smooth ms antibody, mitochondrial antibody. Follow up echinoccocal antibody  3. GI will follow up

## 2018-01-10 NOTE — PROGRESS NOTE ADULT - PROBLEM SELECTOR PLAN 2
CXR with no pneumonia  UA negative for UTI  Blood cultures pending  No active symptoms  Will D/C Vancomycin  Hold off on antibiotics since patient has been on Ertapenem last week

## 2018-01-10 NOTE — PROGRESS NOTE ADULT - SUBJECTIVE AND OBJECTIVE BOX
SARA CLOUD    [id number]    67y      Female    INTERVAL HPI/OVERNIGHT EVENTS: Rapid response overnight for code sepsis. Via bedside . patient states that she continues to have decrease PO intake due to abdominal cramping.     Hospital course:  68 yo F with h/o DM, GERD, HTN, HLD presents with worsening abdominal pain and fevers. States that these symptoms have been ongoing for many years. She reports that she is originally from [facility] and [place] ago. 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 Rapid HIV ab is positive. C diff negative.     REVIEW OF SYSTEMS:    RESPIRATORY: No cough   CARDIOVASCULAR: No chest pain     Vital Signs Last 24 Hrs  T(C): 39.7 (10 Julian 2018 08:53), Max: 39.7 (10 Julian 2018 08:53)  T(F): 103.5 (10 Julian 2018 08:53), Max: 103.5 (10 Julian 2018 08:53)  HR: 110 (10 Julian 2018 08:53) (87 - 110)  BP: 117/67 (10 Julian 2018 08:53) (94/47 - 148/70)  BP(mean): --  RR: 18 (10 Julian 2018 08:53) (16 - 20)  SpO2: 99% (10 Julian 2018 08:53) (95% - 100%)    PHYSICAL EXAM:    GENERAL: NAD   HEENT: PERRL, +EOMI  CHEST/LUNG: Clear to percussion bilaterally   HEART: S1S2+   ABDOMEN: Soft, Nontender, Nondistended; Bowel sounds present     LABS:                        7.6    3.6   )-----------( 75       ( 10 Julian 2018 10:10 )             22.9     01-10    136  |  103  |  12.0  ----------------------------<  114  3.5   |  17.0<L>  |  0.77    Ca    8.1<L>      10 Julian 2018 08:28  Phos  4.6     01-08  Mg     1.3     10    TPro  5.4<L>  /  Alb  2.8<L>  /  TBili  0.6  /  DBili  x   /  AST  171<H>  /  ALT  39<H>  /  AlkPhos  156<H>  01-10    PT/INR - ( 10 Julian 2018 08:28 )   PT: 14.2 sec;   INR: 1.28 ratio           Urinalysis Basic - ( 10 Julian 2018 02:42 )    Color: Yellow / Appearance: Clear / S.015 / pH: x  Gluc: x / Ketone: Negative  / Bili: Negative / Urobili: Negative mg/dL   Blood: x / Protein: 30 mg/dL / Nitrite: Negative   Leuk Esterase: Trace / RBC: 0-2 /HPF / WBC 0-2   Sq Epi: x / Non Sq Epi: Occasional / Bacteria: x          MEDICATIONS  (STANDING):  clotrimazole Lozenge 1 Lozenge Oral five times a day  dextrose 5%. 1000 milliLiter(s) (50 mL/Hr) IV Continuous <Continuous>  dextrose 50% Injectable 12.5 Gram(s) IV Push once  dextrose 50% Injectable 25 Gram(s) IV Push once  dextrose 50% Injectable 25 Gram(s) IV Push once  enoxaparin Injectable 40 milliGRAM(s) SubCutaneous daily  insulin lispro (HumaLOG) corrective regimen sliding scale   SubCutaneous three times a day before meals  insulin lispro (HumaLOG) corrective regimen sliding scale   SubCutaneous at bedtime  magnesium sulfate  IVPB 2 Gram(s) IV Intermittent once  piperacillin/tazobactam IVPB. 3.375 Gram(s) IV Intermittent every 8 hours  sodium chloride 0.9%. 1000 milliLiter(s) (100 mL/Hr) IV Continuous <Continuous>  vancomycin  IVPB 1000 milliGRAM(s) IV Intermittent every 12 hours    MEDICATIONS  (PRN):  aluminum hydroxide/magnesium hydroxide/simethicone Suspension 30 milliLiter(s) Oral every 4 hours PRN Dyspepsia  dextrose Gel 1 Dose(s) Oral once PRN Blood Glucose LESS THAN 70 milliGRAM(s)/deciliter  glucagon  Injectable 1 milliGRAM(s) IntraMuscular once PRN Glucose LESS THAN 70 milligrams/deciliter  ibuprofen  Tablet 600 milliGRAM(s) Oral every 6 hours PRN Tmax 100.4  morphine  - Injectable 2 milliGRAM(s) IV Push every 6 hours PRN Moderate Pain (4 - 6)      RADIOLOGY & ADDITIONAL TESTS:

## 2018-01-10 NOTE — CHART NOTE - NSCHARTNOTEFT_GEN_A_CORE
Patient denies any complaints.  States that she is sleepy.    Labs reviewed.  Potassium replaced. Lactate negative.  Pt had a drop in Hgn from 9 to 7. Likely dilutional.  STAT fecal occult ordered.  As per RN, patient had blood streaked stool.    On exam, no gross blood noted but patient does have small hemorrhoid.   Discussed with Dr. Gonzalez and we decided to transfuse 1 unit.   STAT Type and Cross ordered.   Discussed with Dr. Gonzalez, in full agreement with plan.   Sharron, PGY 3

## 2018-01-11 DIAGNOSIS — R79.89 OTHER SPECIFIED ABNORMAL FINDINGS OF BLOOD CHEMISTRY: ICD-10-CM

## 2018-01-11 DIAGNOSIS — R93.8 ABNORMAL FINDINGS ON DIAGNOSTIC IMAGING OF OTHER SPECIFIED BODY STRUCTURES: ICD-10-CM

## 2018-01-11 LAB
ALBUMIN SERPL ELPH-MCNC: 2.8 G/DL — LOW (ref 3.3–5.2)
ALP SERPL-CCNC: 175 U/L — HIGH (ref 40–120)
ALT FLD-CCNC: 37 U/L — HIGH
ANION GAP SERPL CALC-SCNC: 16 MMOL/L — SIGNIFICANT CHANGE UP (ref 5–17)
AST SERPL-CCNC: 153 U/L — HIGH
BILIRUB DIRECT SERPL-MCNC: 0.5 MG/DL — HIGH (ref 0–0.3)
BILIRUB INDIRECT FLD-MCNC: 0.4 MG/DL — SIGNIFICANT CHANGE UP (ref 0.2–1)
BILIRUB SERPL-MCNC: 0.9 MG/DL — SIGNIFICANT CHANGE UP (ref 0.4–2)
BUN SERPL-MCNC: 13 MG/DL — SIGNIFICANT CHANGE UP (ref 8–20)
CALCIUM SERPL-MCNC: 9.1 MG/DL — SIGNIFICANT CHANGE UP (ref 8.6–10.2)
CHLORIDE SERPL-SCNC: 106 MMOL/L — SIGNIFICANT CHANGE UP (ref 98–107)
CO2 SERPL-SCNC: 16 MMOL/L — LOW (ref 22–29)
CREAT SERPL-MCNC: 0.62 MG/DL — SIGNIFICANT CHANGE UP (ref 0.5–1.3)
CULTURE RESULTS: NO GROWTH — SIGNIFICANT CHANGE UP
CULTURE RESULTS: SIGNIFICANT CHANGE UP
ECHINOCOCCUS AB TITR SER: NEGATIVE — SIGNIFICANT CHANGE UP
EOSINOPHIL # BLD AUTO: 0.1 K/UL — SIGNIFICANT CHANGE UP (ref 0–0.5)
EOSINOPHIL NFR BLD AUTO: 2 % — SIGNIFICANT CHANGE UP (ref 0–6)
GLUCOSE BLDC GLUCOMTR-MCNC: 118 MG/DL — HIGH (ref 70–99)
GLUCOSE BLDC GLUCOMTR-MCNC: 214 MG/DL — HIGH (ref 70–99)
GLUCOSE BLDC GLUCOMTR-MCNC: 86 MG/DL — SIGNIFICANT CHANGE UP (ref 70–99)
GLUCOSE BLDC GLUCOMTR-MCNC: 92 MG/DL — SIGNIFICANT CHANGE UP (ref 70–99)
GLUCOSE SERPL-MCNC: 110 MG/DL — SIGNIFICANT CHANGE UP (ref 70–115)
HCT VFR BLD CALC: 33.5 % — LOW (ref 37–47)
HCT VFR BLD CALC: 34.9 % — LOW (ref 37–47)
HCT VFR BLD CALC: 35.2 % — LOW (ref 37–47)
HGB BLD-MCNC: 11.4 G/DL — LOW (ref 12–16)
HGB BLD-MCNC: 12 G/DL — SIGNIFICANT CHANGE UP (ref 12–16)
HGB BLD-MCNC: 12 G/DL — SIGNIFICANT CHANGE UP (ref 12–16)
INR BLD: 1.22 RATIO — HIGH (ref 0.88–1.16)
LYMPHOCYTES # BLD AUTO: 0.3 K/UL — LOW (ref 1–4.8)
LYMPHOCYTES # BLD AUTO: 6.6 % — LOW (ref 20–55)
MAGNESIUM SERPL-MCNC: 1.4 MG/DL — LOW (ref 1.6–2.6)
MCHC RBC-ENTMCNC: 26.8 PG — LOW (ref 27–31)
MCHC RBC-ENTMCNC: 26.9 PG — LOW (ref 27–31)
MCHC RBC-ENTMCNC: 26.9 PG — LOW (ref 27–31)
MCHC RBC-ENTMCNC: 34 G/DL — SIGNIFICANT CHANGE UP (ref 32–36)
MCHC RBC-ENTMCNC: 34.1 G/DL — SIGNIFICANT CHANGE UP (ref 32–36)
MCHC RBC-ENTMCNC: 34.4 G/DL — SIGNIFICANT CHANGE UP (ref 32–36)
MCV RBC AUTO: 78.3 FL — LOW (ref 81–99)
MCV RBC AUTO: 78.7 FL — LOW (ref 81–99)
MCV RBC AUTO: 79 FL — LOW (ref 81–99)
MONOCYTES # BLD AUTO: 0 K/UL — SIGNIFICANT CHANGE UP (ref 0–0.8)
MONOCYTES NFR BLD AUTO: 1.1 % — LOW (ref 3–10)
NEUTROPHILS # BLD AUTO: 4.1 K/UL — SIGNIFICANT CHANGE UP (ref 1.8–8)
NEUTROPHILS NFR BLD AUTO: 89.6 % — HIGH (ref 37–73)
NIGHT BLUE STAIN TISS: SIGNIFICANT CHANGE UP
PHOSPHATE SERPL-MCNC: 2.8 MG/DL — SIGNIFICANT CHANGE UP (ref 2.4–4.7)
PLATELET # BLD AUTO: 42 K/UL — LOW (ref 150–400)
PLATELET # BLD AUTO: 44 K/UL — LOW (ref 150–400)
PLATELET # BLD AUTO: 52 K/UL — LOW (ref 150–400)
POTASSIUM SERPL-MCNC: 3.6 MMOL/L — SIGNIFICANT CHANGE UP (ref 3.5–5.3)
POTASSIUM SERPL-SCNC: 3.6 MMOL/L — SIGNIFICANT CHANGE UP (ref 3.5–5.3)
PROT SERPL-MCNC: 5.8 G/DL — LOW (ref 6.6–8.7)
PROTHROM AB SERPL-ACNC: 13.5 SEC — HIGH (ref 9.8–12.7)
RBC # BLD: 4.24 M/UL — LOW (ref 4.4–5.2)
RBC # BLD: 4.46 M/UL — SIGNIFICANT CHANGE UP (ref 4.4–5.2)
RBC # BLD: 4.47 M/UL — SIGNIFICANT CHANGE UP (ref 4.4–5.2)
RBC # FLD: 14.5 % — SIGNIFICANT CHANGE UP (ref 11–15.6)
RBC # FLD: 15 % — SIGNIFICANT CHANGE UP (ref 11–15.6)
RBC # FLD: 15.3 % — SIGNIFICANT CHANGE UP (ref 11–15.6)
SODIUM SERPL-SCNC: 138 MMOL/L — SIGNIFICANT CHANGE UP (ref 135–145)
SPECIMEN SOURCE: SIGNIFICANT CHANGE UP
WBC # BLD: 4.4 K/UL — LOW (ref 4.8–10.8)
WBC # BLD: 4.5 K/UL — LOW (ref 4.8–10.8)
WBC # BLD: 4.6 K/UL — LOW (ref 4.8–10.8)
WBC # FLD AUTO: 4.4 K/UL — LOW (ref 4.8–10.8)
WBC # FLD AUTO: 4.5 K/UL — LOW (ref 4.8–10.8)
WBC # FLD AUTO: 4.6 K/UL — LOW (ref 4.8–10.8)

## 2018-01-11 PROCEDURE — 99233 SBSQ HOSP IP/OBS HIGH 50: CPT

## 2018-01-11 PROCEDURE — 99232 SBSQ HOSP IP/OBS MODERATE 35: CPT

## 2018-01-11 RX ORDER — SODIUM CHLORIDE 9 MG/ML
1000 INJECTION, SOLUTION INTRAVENOUS
Qty: 0 | Refills: 0 | Status: DISCONTINUED | OUTPATIENT
Start: 2018-01-11 | End: 2018-01-12

## 2018-01-11 RX ORDER — MAGNESIUM SULFATE 500 MG/ML
2 VIAL (ML) INJECTION ONCE
Qty: 0 | Refills: 0 | Status: COMPLETED | OUTPATIENT
Start: 2018-01-11 | End: 2018-01-11

## 2018-01-11 RX ORDER — ONDANSETRON 8 MG/1
4 TABLET, FILM COATED ORAL ONCE
Qty: 0 | Refills: 0 | Status: COMPLETED | OUTPATIENT
Start: 2018-01-11 | End: 2018-01-11

## 2018-01-11 RX ORDER — POTASSIUM CHLORIDE 20 MEQ
40 PACKET (EA) ORAL EVERY 4 HOURS
Qty: 0 | Refills: 0 | Status: COMPLETED | OUTPATIENT
Start: 2018-01-11 | End: 2018-01-11

## 2018-01-11 RX ADMIN — SODIUM CHLORIDE 125 MILLILITER(S): 9 INJECTION, SOLUTION INTRAVENOUS at 13:14

## 2018-01-11 RX ADMIN — Medication 40 MILLIEQUIVALENT(S): at 11:57

## 2018-01-11 RX ADMIN — SODIUM CHLORIDE 100 MILLILITER(S): 9 INJECTION INTRAMUSCULAR; INTRAVENOUS; SUBCUTANEOUS at 06:40

## 2018-01-11 RX ADMIN — Medication 50 GRAM(S): at 11:53

## 2018-01-11 RX ADMIN — Medication 40 MILLIEQUIVALENT(S): at 17:42

## 2018-01-11 RX ADMIN — Medication 1 LOZENGE: at 10:31

## 2018-01-11 RX ADMIN — Medication 600 MILLIGRAM(S): at 20:30

## 2018-01-11 RX ADMIN — Medication 600 MILLIGRAM(S): at 11:56

## 2018-01-11 RX ADMIN — Medication 4: at 12:06

## 2018-01-11 RX ADMIN — Medication 1 LOZENGE: at 17:42

## 2018-01-11 RX ADMIN — Medication 600 MILLIGRAM(S): at 00:44

## 2018-01-11 RX ADMIN — Medication 1 LOZENGE: at 20:05

## 2018-01-11 RX ADMIN — Medication 1 LOZENGE: at 13:13

## 2018-01-11 RX ADMIN — ONDANSETRON 4 MILLIGRAM(S): 8 TABLET, FILM COATED ORAL at 20:04

## 2018-01-11 NOTE — PROGRESS NOTE ADULT - PROBLEM SELECTOR PLAN 2
possibly just due to AIDs and reactive but other serologies pending. Could be due to ecchinoccus infection as well.

## 2018-01-11 NOTE — PROGRESS NOTE ADULT - SUBJECTIVE AND OBJECTIVE BOX
[facility]  INFECTIOUS DISEASES AND INTERNAL MEDICINE at [place]  =======================================================  Raymon Ortiz MD  Diplomates American Board of Internal Medicine and Infectious Diseases  =======================================================    CLOUD, SARA [id number]  Seen with      Follow up: Diarrhea    Febrile last night  No complaints  Still has diarrhea    Allergies:  No Known Allergies      Antibiotics:  None      REVIEW OF SYSTEMS:  CONSTITUTIONAL:  + Fever + chills  HEENT:  No diplopia or blurred vision.  No earache, sore throat or runny nose.  CARDIOVASCULAR:  No pressure, squeezing, strangling, tightness, heaviness or aching about the chest, neck, axilla or epigastrium.  RESPIRATORY:  No cough, shortness of breath  GASTROINTESTINAL:  + diarrhea.  GENITOURINARY:  No dysuria, frequency or urgency. No Blood in urine  MUSCULOSKELETAL:  no joint aches, no muscle pain  SKIN:  No change in skin, hair or nails.  NEUROLOGIC:  No paresthesias, fasciculations  PSYCHIATRIC:  No disorder of thought or mood.  ENDOCRINE:  No heat or cold intolerance  HEMATOLOGICAL:  No easy bruising or bleeding.      Physical Exam:  Vital Signs Last 24 Hrs  T(C): 39.6 (2018 11:52), Max: 39.6 (2018 11:52)  T(F): 103.2 (2018 11:52), Max: 103.2 (2018 11:52)  HR: 106 (2018 11:52) (70 - 126)  BP: 137/67 (2018 11:52) (102/59 - 166/95)  BP(mean): 75 (2018 04:00) (75 - 100)  RR: 20 (2018 11:52) (18 - 25)  SpO2: 99% (2018 11:52) (99% - 100%)      GEN: NAD, pleasant  HEENT: normocephalic and atraumatic. EOMI. PERRL.    NECK: Supple.   LUNGS: Clear to auscultation.  HEART: Regular rate and rhythm  ABDOMEN: Soft, nontender, and nondistended.  Positive bowel sounds.    : No CVA tenderness  EXTREMITIES: Without any edema.  MSK: No joint swelling  NEUROLOGIC: Cranial nerves II through XII are grossly intact.  PSYCHIATRIC: Appropriate affect .  SKIN: No rash      Labs:      138  |  106  |  13.0  ----------------------------<  110  3.6   |  16.0<L>  |  0.62    Ca    9.1      2018 09:36  Phos  2.8       Mg     1.4         TPro  5.8<L>  /  Alb  2.8<L>  /  TBili  0.9  /  DBili  0.5<H>  /  AST  153<H>  /  ALT  37<H>  /  AlkPhos  175<H>                 12.0   4.6   )-----------( 44       ( 2018 09:36 )             35.2       PT/INR - ( 2018 09:36 )   PT: 13.5 sec;   INR: 1.22 ratio      Urinalysis Basic - ( 10 Julian 2018 02:42 )    Color: Yellow / Appearance: Clear / S.015 / pH: x  Gluc: x / Ketone: Negative  / Bili: Negative / Urobili: Negative mg/dL   Blood: x / Protein: 30 mg/dL / Nitrite: Negative   Leuk Esterase: Trace / RBC: 0-2 /HPF / WBC 0-2   Sq Epi: x / Non Sq Epi: Occasional / Bacteria: x      LIVER FUNCTIONS - ( 2018 09:36 )  Alb: 2.8 g/dL / Pro: 5.8 g/dL / ALK PHOS: 175 U/L / ALT: 37 U/L / AST: 153 U/L / GGT: x             RECENT CULTURES:  01-10 @ 02:43 .Urine Catheterized     No growth       @ 18:36 .Stool Feces     No Protozoa seen by trichrome stain  No Helminths or Protozoa seen in formalin concentrate  performed by iodine stain  (routine O+P not evaluated for Microsporidia,  Cryptosporidia, Cyclospora, or Isospora.)  Note: One negative specimen does not rule  out the possibility of a parasitic infection.       @ 11:01 .Stool Feces     No enteric gram negative rods isolated  No enteric pathogens isolated.  (Stool culture examined for Salmonella,  Shigella, Campylobacter, Aeromonas, Plesiomonas,  Vibrio, E.coli O157 and Yersinia)       @ 19:42 .Blood Blood     No growth at 48 hours       @ 19:41 .Blood Blood     No growth at 48 hours       @ 17:48 .Stool Feces     No enteric pathogens isolated.  (Stool culture examined for Salmonella,  Shigella, Campylobacter, Aeromonas, Plesiomonas,  Vibrio, E.coli O157 and Yersinia)       @ 16:02 .Blood Blood     No growth at 5 days.       @ 16:01 .Blood Blood     No growth at 5 days.       @ 19:54    RVP  NotDetec       @ 05:19 .Urine Clean Catch (Midstream) Escherichia coli ESBL    10,000 - 49,000 CFU/mL Escherichia coli ESBL  >100,000 CFU/ml Streptococcus agalactiae (Group B)      EXAM:  US ABDOMEN LIMITED                        PROCEDURE DATE:  2018    INTERPRETATION:  CLINICAL INFORMATION: Hepatic cyst, long-standing right   upper quadrant pain, nausea and diarrhea  COMPARISON: Ultrasound dated 2017  TECHNIQUE: Sonography of the right upper quadrant.   FINDINGS:  Liver: Increased echogenicity and measures 19.1 cm in length. Calcified   left hepatic lobe cyst measures 9 mm. Calcified right hepatic lobe cyst   measures 1.1 cm.  Bile ducts: Normal caliber. Common bile duct measures 5.5 mm.   Gallbladder: Status post cholecystectomy      Pancreas: Visualized portions are within normal limits.  Right kidney: 10.1 cm. No hydronephrosis.      Ascites: None.  IVC: Visualized portions are within normal limits.  IMPRESSION:   Fatty liver. Calcified hepatic cysts      EXAM:  CT ABDOMEN AND PELVIS OC IC                        PROCEDURE DATE:  2018    INTERPRETATION:  Contrast enhanced CT of the abdomen and pelvis .  COMPARISON: 2017 normal CT scan.  CLINICAL HISTORY:    .  Technique: contiguous axial images were obtained with 2.5 mm slice   thickness after intravenous and oral contrast administration.  Coronal and sagittal reformats were also submitted for interpretation.  100 ml of Omnipaque were injected intravenously, and 0 ml were discarded,   without complications noted.   FINDINGS:   A tree-in-bud pattern of opacity seen within the periphery of right lower   lobe likely infectious in etiology.  There is no free intra-abdominal air or ascites.  Multiple lung small subhepatic abscess with a peripheral calcifications   noted in the right and left hepatic lobes likely indicating a   echinococcus cysts. Left hepatic lobe cyst measures 1.1 cm and the right   hepatic lobe cysts measure 1.1 and 0.9 cm of. No other lesions seen. Is mild hepatomegaly.  The, spleen, pancreas, adrenal glands, gallbladder are normal.  There is no intra or extrahepatic biliary ductal dilatation.  The stomach, duodenum, small and large bowel and appendix are within normal limits.  Both kidneys show normal uptake of contrast media without masses or hydronephrosis.    The urinary bladder shows normal morphology and contour.  The reproductive organs are within normal limits.   There are no retroperitoneal masses or abnormal lymphadenopathy.  The retroperitoneal vessels are normal.    There is degenerative spondylosis of thoracal lumbar spine with a   posterior disc ridge complexes@the L3-L4 T12-L1 and T11-T12 disc level   causing mild central canal stenosis .   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

## 2018-01-11 NOTE — PROGRESS NOTE ADULT - SUBJECTIVE AND OBJECTIVE BOX
Pt seen and examined f/u + HIV and diarrhea with elevated LFTs  This morning she still complains of nonbloody diarrhea but no abdominal pain, nausea or vomiting. Tolerating diet. Stool for O&P, C&S, giardia antigen and C diff all neg. Tmax yesterday 102.    REVIEW OF SYSTEMS:    CONSTITUTIONAL: No fever, weight loss, or fatigue  EYES: No eye pain, visual disturbances, or discharge  ENMT:  No difficulty hearing, tinnitus, vertigo; No sinus or throat pain  RESPIRATORY: No cough, wheezing, chills or hemoptysis; No shortness of breath  CARDIOVASCULAR: No chest pain, palpitations, dizziness, or leg swelling  GASTROINTESTINAL: No abdominal or epigastric pain. No nausea, vomiting, or hematemesis; No diarrhea or constipation. No melena or hematochezia.    MEDICATIONS:  MEDICATIONS  (STANDING):  clotrimazole Lozenge 1 Lozenge Oral five times a day  dextrose 5%. 1000 milliLiter(s) (50 mL/Hr) IV Continuous <Continuous>  dextrose 50% Injectable 12.5 Gram(s) IV Push once  dextrose 50% Injectable 25 Gram(s) IV Push once  dextrose 50% Injectable 25 Gram(s) IV Push once  enoxaparin Injectable 40 milliGRAM(s) SubCutaneous daily  insulin lispro (HumaLOG) corrective regimen sliding scale   SubCutaneous three times a day before meals  insulin lispro (HumaLOG) corrective regimen sliding scale   SubCutaneous at bedtime  sodium chloride 0.9%. 1000 milliLiter(s) (100 mL/Hr) IV Continuous <Continuous>    MEDICATIONS  (PRN):  aluminum hydroxide/magnesium hydroxide/simethicone Suspension 30 milliLiter(s) Oral every 4 hours PRN Dyspepsia  dextrose Gel 1 Dose(s) Oral once PRN Blood Glucose LESS THAN 70 milliGRAM(s)/deciliter  glucagon  Injectable 1 milliGRAM(s) IntraMuscular once PRN Glucose LESS THAN 70 milligrams/deciliter  ibuprofen  Tablet 600 milliGRAM(s) Oral every 6 hours PRN Tmax 100.4  morphine  - Injectable 2 milliGRAM(s) IV Push every 6 hours PRN Moderate Pain (4 - 6)      Allergies    No Known Allergies    Intolerances        Vital Signs Last 24 Hrs  T(C): 37.9 (2018 08:23), Max: 39.7 (10 Julian 2018 08:53)  T(F): 100.2 (2018 08:23), Max: 103.5 (10 Julian 2018 08:53)  HR: 72 (2018 04:00) (70 - 126)  BP: 121/55 (2018 04:00) (97/42 - 166/95)  BP(mean): 75 (2018 04:00) (75 - 100)  RR: 24 (2018 04:00) (18 - 25)  SpO2: 100% (2018 04:00) (99% - 100%)    01-10 @ 07:01  -  - @ 07:00  --------------------------------------------------------  IN: 2490 mL / OUT: 400 mL / NET: 2090 mL        PHYSICAL EXAM:    General: Well developed; well nourished; in no acute distress  HEENT: MMM, conjunctiva and sclera clear  Gastrointestinal:Abdomen: Soft non-tender non-distended; Normal bowel sounds; No hepatosplenomegaly  Extremities: no cyanosis, clubbing or edema.  Skin: Warm and dry. No obvious rash    LABS:      CBC Full  -  ( 2018 00:02 )  WBC Count : 4.5 K/uL  Hemoglobin : 11.4 g/dL  Hematocrit : 33.5 %  Platelet Count - Automated : 52 K/uL  Mean Cell Volume : 79.0 fl  Mean Cell Hemoglobin : 26.9 pg  Mean Cell Hemoglobin Concentration : 34.0 g/dL  Auto Neutrophil # : x  Auto Lymphocyte # : x  Auto Monocyte # : x  Auto Eosinophil # : x  Auto Basophil # : x  Auto Neutrophil % : x  Auto Lymphocyte % : x  Auto Monocyte % : x  Auto Eosinophil % : x  Auto Basophil % : x    01-10    136  |  103  |  12.0  ----------------------------<  114  3.5   |  17.0<L>  |  0.77    Ca    8.1<L>      10 Julian 2018 08:28  Mg     1.3     01-10    TPro  5.4<L>  /  Alb  2.8<L>  /  TBili  0.6  /  DBili  x   /  AST  171<H>  /  ALT  39<H>  /  AlkPhos  156<H>  01-10    PT/INR - ( 10 Julian 2018 08:28 )   PT: 14.2 sec;   INR: 1.28 ratio               Urinalysis Basic - ( 10 Julian 2018 02:42 )    Color: Yellow / Appearance: Clear / S.015 / pH: x  Gluc: x / Ketone: Negative  / Bili: Negative / Urobili: Negative mg/dL   Blood: x / Protein: 30 mg/dL / Nitrite: Negative   Leuk Esterase: Trace / RBC: 0-2 /HPF / WBC 0-2   Sq Epi: x / Non Sq Epi: Occasional / Bacteria: x

## 2018-01-11 NOTE — PROGRESS NOTE ADULT - PROBLEM SELECTOR PLAN 2
CXR with no pneumonia  UA negative for UTI  Blood cultures no growth  No active symptoms  Hold off on antibiotics since patient has been on Ertapenem last week

## 2018-01-11 NOTE — PROGRESS NOTE ADULT - PROBLEM SELECTOR PLAN 2
CT abd/pelvis showed calcified cysts within the right left hepatic lobes are likely indicating echinococcus cysts.   RUQ US showing fatty liver with multiple calcified hepatic cysts.  Serology pending  ID consult appreciated  GI consult appreciated

## 2018-01-11 NOTE — PROGRESS NOTE ADULT - SUBJECTIVE AND OBJECTIVE BOX
SARA CLOUD    [id number]    67y      Female    INTERVAL HPI/OVERNIGHT EVENTS: Tmax 102 overnight. Via  ID [id number]. Patient states that she is still having multiple episodes of diarrhea, as much as twice per hour. Is able to tolerate some food.     Hospital course:  68 yo F with h/o DM, GERD, HTN, HLD presents with worsening abdominal pain and fevers. States that these symptoms have been ongoing for many years. She reports that she is originally from [facility] and [place] ago. 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 RUQ US showing fatty liver with multiple calcified hepatic cysts. Rapid HIV ab is positive. C diff negative. Urine cultures negative. Blood cultures negative. Stool cultures negative. Stool O&P negative x 2. Stool giardia negative.  Hemoglobin trended down from 9.3 to 7.1. Patient received 2u PRBC with improvement Hgb 11.4. Platelets trended down to 44.        REVIEW OF SYSTEMS:    RESPIRATORY: No cough   CARDIOVASCULAR: No chest pain       Vital Signs Last 24 Hrs  T(C): 37.9 (2018 08:23), Max: 39 (10 Julian 2018 23:50)  T(F): 100.2 (2018 08:23), Max: 102.2 (10 Julian 2018 23:50)  HR: 86 (2018 08:29) (70 - 126)  BP: 146/68 (2018 08:29) (97/42 - 166/95)  BP(mean): 75 (2018 04:00) (75 - 100)  RR: 18 (2018 08:29) (18 - 25)  SpO2: 100% (2018 08:29) (99% - 100%)    PHYSICAL EXAM:    GENERAL: NAD, less toxic appearing today  HEENT: PERRL, +EOMI, MMM  CHEST/LUNG: Clear to percussion bilaterally; No wheezing  HEART: S1S2+, Regular rate and rhythm   ABDOMEN: Soft, Nontender, Nondistended; Bowel sounds present       LABS:                        12.0   4.6   )-----------( 44       ( 2018 09:36 )             35.2     01-11    138  |  106  |  13.0  ----------------------------<  110  3.6   |  16.0<L>  |  0.62    Ca    9.1      2018 09:36  Phos  2.8       Mg     1.4         TPro  5.8<L>  /  Alb  2.8<L>  /  TBili  0.9  /  DBili  0.5<H>  /  AST  153<H>  /  ALT  37<H>  /  AlkPhos  175<H>      PT/INR - ( 2018 09:36 )   PT: 13.5 sec;   INR: 1.22 ratio           Urinalysis Basic - ( 10 Julian 2018 02:42 )    Color: Yellow / Appearance: Clear / S.015 / pH: x  Gluc: x / Ketone: Negative  / Bili: Negative / Urobili: Negative mg/dL   Blood: x / Protein: 30 mg/dL / Nitrite: Negative   Leuk Esterase: Trace / RBC: 0-2 /HPF / WBC 0-2   Sq Epi: x / Non Sq Epi: Occasional / Bacteria: x          MEDICATIONS  (STANDING):  clotrimazole Lozenge 1 Lozenge Oral five times a day  dextrose 5%. 1000 milliLiter(s) (50 mL/Hr) IV Continuous <Continuous>  dextrose 50% Injectable 12.5 Gram(s) IV Push once  dextrose 50% Injectable 25 Gram(s) IV Push once  dextrose 50% Injectable 25 Gram(s) IV Push once  insulin lispro (HumaLOG) corrective regimen sliding scale   SubCutaneous three times a day before meals  insulin lispro (HumaLOG) corrective regimen sliding scale   SubCutaneous at bedtime  lactated ringers. 1000 milliLiter(s) (125 mL/Hr) IV Continuous <Continuous>  magnesium sulfate  IVPB 2 Gram(s) IV Intermittent once  potassium chloride    Tablet ER 40 milliEquivalent(s) Oral every 4 hours    MEDICATIONS  (PRN):  aluminum hydroxide/magnesium hydroxide/simethicone Suspension 30 milliLiter(s) Oral every 4 hours PRN Dyspepsia  dextrose Gel 1 Dose(s) Oral once PRN Blood Glucose LESS THAN 70 milliGRAM(s)/deciliter  glucagon  Injectable 1 milliGRAM(s) IntraMuscular once PRN Glucose LESS THAN 70 milligrams/deciliter  ibuprofen  Tablet 600 milliGRAM(s) Oral every 6 hours PRN Tmax 100.4  morphine  - Injectable 2 milliGRAM(s) IV Push every 6 hours PRN Moderate Pain (4 - 6)      RADIOLOGY & ADDITIONAL TESTS:

## 2018-01-11 NOTE — PROGRESS NOTE ADULT - PROBLEM SELECTOR PLAN 1
Unclear etiology  Stool cultures negative. Stool O&P negative x 2. Stool giardia negative.  C. diff negative.  possibly 2/2 HIV related diarrhea: CMV IgM pending

## 2018-01-11 NOTE — PROGRESS NOTE ADULT - PROBLEM SELECTOR PLAN 1
Do Stool PCR, Stool O&P, Stool AFB  stool Giardia Ag negative  C Diff is negative  Stool culture negative

## 2018-01-12 LAB
ALBUMIN SERPL ELPH-MCNC: 2.4 G/DL — LOW (ref 3.3–5.2)
ALP SERPL-CCNC: 190 U/L — HIGH (ref 40–120)
ALT FLD-CCNC: 38 U/L — HIGH
ANION GAP SERPL CALC-SCNC: 14 MMOL/L — SIGNIFICANT CHANGE UP (ref 5–17)
AST SERPL-CCNC: 183 U/L — HIGH
BILIRUB SERPL-MCNC: 1.1 MG/DL — SIGNIFICANT CHANGE UP (ref 0.4–2)
BUN SERPL-MCNC: 13 MG/DL — SIGNIFICANT CHANGE UP (ref 8–20)
BURR CELLS BLD QL SMEAR: PRESENT — SIGNIFICANT CHANGE UP
CALCIUM SERPL-MCNC: 9.1 MG/DL — SIGNIFICANT CHANGE UP (ref 8.6–10.2)
CHLORIDE SERPL-SCNC: 107 MMOL/L — SIGNIFICANT CHANGE UP (ref 98–107)
CMV IGM FLD-ACNC: <8 AU/ML — SIGNIFICANT CHANGE UP
CMV IGM SERPL QL: NEGATIVE — SIGNIFICANT CHANGE UP
CO2 SERPL-SCNC: 15 MMOL/L — LOW (ref 22–29)
CREAT SERPL-MCNC: 0.47 MG/DL — LOW (ref 0.5–1.3)
CRP SERPL-MCNC: 11.4 MG/DL — HIGH (ref 0–0.4)
EBV EA AB SER IA-ACNC: <5 U/ML — SIGNIFICANT CHANGE UP
EBV EA AB TITR SER IF: NEGATIVE — SIGNIFICANT CHANGE UP
EBV EA IGG SER-ACNC: NEGATIVE — SIGNIFICANT CHANGE UP
EBV NA IGG SER IA-ACNC: <3 U/ML — SIGNIFICANT CHANGE UP
EBV PATRN SPEC IB-IMP: SIGNIFICANT CHANGE UP
EBV VCA IGG AVIDITY SER QL IA: POSITIVE
EBV VCA IGM SER IA-ACNC: <10 U/ML — SIGNIFICANT CHANGE UP
EBV VCA IGM SER IA-ACNC: >750 U/ML — HIGH
EBV VCA IGM TITR FLD: NEGATIVE — SIGNIFICANT CHANGE UP
ELLIPTOCYTES BLD QL SMEAR: SLIGHT — SIGNIFICANT CHANGE UP
EOSINOPHIL # BLD AUTO: 0.1 K/UL — SIGNIFICANT CHANGE UP (ref 0–0.5)
EOSINOPHIL NFR BLD AUTO: 2 % — SIGNIFICANT CHANGE UP (ref 0–5)
GAS PNL BLDA: SIGNIFICANT CHANGE UP
GLUCOSE BLDC GLUCOMTR-MCNC: 111 MG/DL — HIGH (ref 70–99)
GLUCOSE BLDC GLUCOMTR-MCNC: 114 MG/DL — HIGH (ref 70–99)
GLUCOSE BLDC GLUCOMTR-MCNC: 149 MG/DL — HIGH (ref 70–99)
GLUCOSE BLDC GLUCOMTR-MCNC: 83 MG/DL — SIGNIFICANT CHANGE UP (ref 70–99)
GLUCOSE SERPL-MCNC: 91 MG/DL — SIGNIFICANT CHANGE UP (ref 70–115)
HCT VFR BLD CALC: 30.1 % — LOW (ref 37–47)
HGB BLD-MCNC: 10.4 G/DL — LOW (ref 12–16)
HYPOCHROMIA BLD QL: SLIGHT — SIGNIFICANT CHANGE UP
LYMPHOCYTES # BLD AUTO: 0.2 K/UL — LOW (ref 1–4.8)
LYMPHOCYTES # BLD AUTO: 8 % — LOW (ref 20–55)
MAGNESIUM SERPL-MCNC: 1.3 MG/DL — LOW (ref 1.6–2.6)
MCHC RBC-ENTMCNC: 26.7 PG — LOW (ref 27–31)
MCHC RBC-ENTMCNC: 34.6 G/DL — SIGNIFICANT CHANGE UP (ref 32–36)
MCV RBC AUTO: 77.4 FL — LOW (ref 81–99)
MITOCHONDRIA AB SER-ACNC: SIGNIFICANT CHANGE UP
MONOCYTES # BLD AUTO: 0 K/UL — SIGNIFICANT CHANGE UP (ref 0–0.8)
MONOCYTES NFR BLD AUTO: 1 % — LOW (ref 3–10)
NEUTROPHILS # BLD AUTO: 2.6 K/UL — SIGNIFICANT CHANGE UP (ref 1.8–8)
NEUTROPHILS NFR BLD AUTO: 83 % — HIGH (ref 37–73)
NEUTS BAND # BLD: 6 % — SIGNIFICANT CHANGE UP (ref 0–8)
OVALOCYTES BLD QL SMEAR: SLIGHT — SIGNIFICANT CHANGE UP
PHOSPHATE SERPL-MCNC: 2.3 MG/DL — LOW (ref 2.4–4.7)
PLAT MORPH BLD: NORMAL — SIGNIFICANT CHANGE UP
PLATELET # BLD AUTO: 41 K/UL — LOW (ref 150–400)
POTASSIUM SERPL-MCNC: 4.2 MMOL/L — SIGNIFICANT CHANGE UP (ref 3.5–5.3)
POTASSIUM SERPL-SCNC: 4.2 MMOL/L — SIGNIFICANT CHANGE UP (ref 3.5–5.3)
PROT SERPL-MCNC: 5.1 G/DL — LOW (ref 6.6–8.7)
RBC # BLD: 3.89 M/UL — LOW (ref 4.4–5.2)
RBC # FLD: 14.9 % — SIGNIFICANT CHANGE UP (ref 11–15.6)
RBC BLD AUTO: ABNORMAL
SCHISTOCYTES BLD QL AUTO: SLIGHT — SIGNIFICANT CHANGE UP
SMOOTH MUSCLE AB SER-ACNC: SIGNIFICANT CHANGE UP
SODIUM SERPL-SCNC: 136 MMOL/L — SIGNIFICANT CHANGE UP (ref 135–145)
WBC # BLD: 3.3 K/UL — LOW (ref 4.8–10.8)
WBC # FLD AUTO: 3.3 K/UL — LOW (ref 4.8–10.8)

## 2018-01-12 PROCEDURE — 99223 1ST HOSP IP/OBS HIGH 75: CPT

## 2018-01-12 PROCEDURE — 71045 X-RAY EXAM CHEST 1 VIEW: CPT | Mod: 26

## 2018-01-12 PROCEDURE — 99232 SBSQ HOSP IP/OBS MODERATE 35: CPT

## 2018-01-12 PROCEDURE — 99233 SBSQ HOSP IP/OBS HIGH 50: CPT

## 2018-01-12 RX ORDER — NYSTATIN CREAM 100000 [USP'U]/G
1 CREAM TOPICAL
Qty: 0 | Refills: 0 | Status: DISCONTINUED | OUTPATIENT
Start: 2018-01-12 | End: 2018-01-18

## 2018-01-12 RX ORDER — SODIUM BICARBONATE 1 MEQ/ML
0.19 SYRINGE (ML) INTRAVENOUS
Qty: 150 | Refills: 0 | Status: DISCONTINUED | OUTPATIENT
Start: 2018-01-12 | End: 2018-01-12

## 2018-01-12 RX ORDER — IBUPROFEN 200 MG
800 TABLET ORAL EVERY 6 HOURS
Qty: 0 | Refills: 0 | Status: DISCONTINUED | OUTPATIENT
Start: 2018-01-12 | End: 2018-01-16

## 2018-01-12 RX ORDER — FUROSEMIDE 40 MG
40 TABLET ORAL ONCE
Qty: 0 | Refills: 0 | Status: COMPLETED | OUTPATIENT
Start: 2018-01-12 | End: 2018-01-12

## 2018-01-12 RX ORDER — SODIUM BICARBONATE 1 MEQ/ML
650 SYRINGE (ML) INTRAVENOUS ONCE
Qty: 0 | Refills: 0 | Status: COMPLETED | OUTPATIENT
Start: 2018-01-12 | End: 2018-01-12

## 2018-01-12 RX ORDER — POTASSIUM PHOSPHATE, MONOBASIC POTASSIUM PHOSPHATE, DIBASIC 236; 224 MG/ML; MG/ML
30 INJECTION, SOLUTION INTRAVENOUS ONCE
Qty: 0 | Refills: 0 | Status: COMPLETED | OUTPATIENT
Start: 2018-01-12 | End: 2018-01-12

## 2018-01-12 RX ORDER — MAGNESIUM SULFATE 500 MG/ML
2 VIAL (ML) INJECTION ONCE
Qty: 0 | Refills: 0 | Status: COMPLETED | OUTPATIENT
Start: 2018-01-12 | End: 2018-01-12

## 2018-01-12 RX ADMIN — NYSTATIN CREAM 1 APPLICATION(S): 100000 CREAM TOPICAL at 17:47

## 2018-01-12 RX ADMIN — MORPHINE SULFATE 2 MILLIGRAM(S): 50 CAPSULE, EXTENDED RELEASE ORAL at 18:15

## 2018-01-12 RX ADMIN — Medication 800 MILLIGRAM(S): at 19:59

## 2018-01-12 RX ADMIN — Medication 1 LOZENGE: at 08:22

## 2018-01-12 RX ADMIN — Medication 600 MILLIGRAM(S): at 00:42

## 2018-01-12 RX ADMIN — MORPHINE SULFATE 2 MILLIGRAM(S): 50 CAPSULE, EXTENDED RELEASE ORAL at 06:49

## 2018-01-12 RX ADMIN — Medication 50 GRAM(S): at 12:42

## 2018-01-12 RX ADMIN — POTASSIUM PHOSPHATE, MONOBASIC POTASSIUM PHOSPHATE, DIBASIC 83.33 MILLIMOLE(S): 236; 224 INJECTION, SOLUTION INTRAVENOUS at 12:42

## 2018-01-12 RX ADMIN — SODIUM CHLORIDE 125 MILLILITER(S): 9 INJECTION, SOLUTION INTRAVENOUS at 00:45

## 2018-01-12 RX ADMIN — MORPHINE SULFATE 2 MILLIGRAM(S): 50 CAPSULE, EXTENDED RELEASE ORAL at 17:44

## 2018-01-12 RX ADMIN — Medication 800 MILLIGRAM(S): at 11:45

## 2018-01-12 RX ADMIN — Medication 40 MILLIGRAM(S): at 21:17

## 2018-01-12 RX ADMIN — Medication 600 MILLIGRAM(S): at 08:29

## 2018-01-12 RX ADMIN — Medication 1 LOZENGE: at 11:47

## 2018-01-12 RX ADMIN — Medication 1 LOZENGE: at 17:48

## 2018-01-12 RX ADMIN — Medication 1 LOZENGE: at 00:45

## 2018-01-12 RX ADMIN — Medication 800 MILLIGRAM(S): at 21:06

## 2018-01-12 RX ADMIN — Medication 1 LOZENGE: at 21:17

## 2018-01-12 RX ADMIN — Medication 40 MILLIGRAM(S): at 17:30

## 2018-01-12 RX ADMIN — Medication 650 MILLIGRAM(S): at 18:52

## 2018-01-12 RX ADMIN — MORPHINE SULFATE 2 MILLIGRAM(S): 50 CAPSULE, EXTENDED RELEASE ORAL at 07:20

## 2018-01-12 RX ADMIN — Medication 75 MEQ/KG/HR: at 19:59

## 2018-01-12 NOTE — PROGRESS NOTE ADULT - PROBLEM SELECTOR PLAN 1
Unclear etiology  Stool cultures negative. Stool O&P negative x 2. Stool giardia negative.  C. diff negative.  CMV IgM negative  +Norovirus

## 2018-01-12 NOTE — DIETITIAN INITIAL EVALUATION ADULT. - ETIOLOGY
related to dehydration dx, increased energy needs in setting of HIV dx and bouts of vomiting, diarrhea

## 2018-01-12 NOTE — DIETITIAN INITIAL EVALUATION ADULT. - OTHER INFO
Admit dx: dehydration related to nausea/vomiting x3 days PTA. Pt was recently diagnosed as HIV pos. during treatment for admit dx. PMH: anemia, DM, HTN, GERD, diarrhea. RN reports poor PO intake, Pt is lethargic. No reports of emesis today. Pt continues with loose stool.

## 2018-01-12 NOTE — CHART NOTE - NSCHARTNOTEFT_GEN_A_CORE
Upon Nutritional Assessment by the Registered Dietitian your patient was determined to meet criteria / has evidence of the following diagnosis/diagnoses:          [ ]  Mild Protein Calorie Malnutrition        [ X]  Moderate Protein Calorie Malnutrition        [ ] Severe Protein Calorie Malnutrition        [ ] Unspecified Protein Calorie Malnutrition        [ ] Underweight / BMI <19        [ ] Morbid Obesity / BMI > 40      Findings as based on:  •  Comprehensive nutrition assessment and consultation  •  Calorie counts (nutrient intake analysis)  •  Food acceptance and intake status from observations by staff  •  Follow up  •  Patient education  •  Intervention secondary to interdisciplinary rounds  •   concerns      Treatment:    The following diet has been recommended: CONTINUE CONS CHO DIET WITH GLUCERNA BID + PROSOURCE DAILY AS TOLERATED.   RX:MVI/DAILY       PROVIDER Section:     By signing this assessment you are acknowledging and agree with the diagnosis/diagnoses assigned by the Registered Dietitian    Comments:

## 2018-01-12 NOTE — PROGRESS NOTE ADULT - SUBJECTIVE AND OBJECTIVE BOX
SARA CLOUD    [id number]    67y      Female    INTERVAL HPI/OVERNIGHT EVENTS: Having fevers throughout the night. Via bedside . Still having approximately 15 episodes of diarrhea overnight.    Hospital course:  68 yo F with h/o DM, GERD, HTN, HLD presents with worsening abdominal pain and fevers. States that these symptoms have been ongoing for many years. She reports that she is originally from [facility] and migrated [place] one [place] ago. 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 RUQ US showing fatty liver with multiple calcified hepatic cysts. Echinoccal antibodies negative. Rapid HIV ab is positive. C diff negative.  Urine cultures negative. Blood cultures negative. Stool cultures negative. Stool O&P negative x 2. Stool giardia negative. Norovoirus positive. Hemoglobin trended down from 9.3 to 7.1. Patient received 2u PRBC with improvement Hgb 11.4. Platelets trended down.     REVIEW OF SYSTEMS:    RESPIRATORY: No cough   CARDIOVASCULAR: No chest pain,     Vital Signs Last 24 Hrs  T(C): 39.3 (12 Jan 2018 08:27), Max: 39.6 (11 Jan 2018 11:52)  T(F): 102.8 (12 Jan 2018 08:27), Max: 103.2 (11 Jan 2018 11:52)  HR: 103 (12 Jan 2018 08:00) (79 - 114)  BP: 114/75 (12 Jan 2018 08:00) (99/62 - 149/80)  BP(mean): 110 (12 Jan 2018 04:00) (72 - 110)  RR: 19 (12 Jan 2018 08:00) (16 - 26)  SpO2: 98% (12 Jan 2018 08:00) (98% - 100%)    PHYSICAL EXAM:    GENERAL: NAD   HEENT: PERRL, +EOMI  CHEST/LUNG: Clear to percussion bilaterally   HEART: S1S2+   ABDOMEN: Soft, Nontender, Nondistended; Bowel sounds present       LABS:                        10.4   3.3   )-----------( 41       ( 12 Jan 2018 08:22 )             30.1     01-12    136  |  107  |  13.0  ----------------------------<  91  4.2   |  15.0<L>  |  0.47<L>    Ca    9.1      12 Jan 2018 08:20  Phos  2.8     01-11  Mg     1.4     01-11    TPro  5.1<L>  /  Alb  2.4<L>  /  TBili  1.1  /  DBili  x   /  AST  183<H>  /  ALT  38<H>  /  AlkPhos  190<H>  01-12    PT/INR - ( 11 Jan 2018 09:36 )   PT: 13.5 sec;   INR: 1.22 ratio                 MEDICATIONS  (STANDING):  clotrimazole Lozenge 1 Lozenge Oral five times a day  dextrose 5%. 1000 milliLiter(s) (50 mL/Hr) IV Continuous <Continuous>  dextrose 50% Injectable 12.5 Gram(s) IV Push once  dextrose 50% Injectable 25 Gram(s) IV Push once  dextrose 50% Injectable 25 Gram(s) IV Push once  insulin lispro (HumaLOG) corrective regimen sliding scale   SubCutaneous three times a day before meals  insulin lispro (HumaLOG) corrective regimen sliding scale   SubCutaneous at bedtime  lactated ringers. 1000 milliLiter(s) (125 mL/Hr) IV Continuous <Continuous>    MEDICATIONS  (PRN):  aluminum hydroxide/magnesium hydroxide/simethicone Suspension 30 milliLiter(s) Oral every 4 hours PRN Dyspepsia  dextrose Gel 1 Dose(s) Oral once PRN Blood Glucose LESS THAN 70 milliGRAM(s)/deciliter  glucagon  Injectable 1 milliGRAM(s) IntraMuscular once PRN Glucose LESS THAN 70 milligrams/deciliter  ibuprofen  Tablet 600 milliGRAM(s) Oral every 6 hours PRN Tmax 100.4  morphine  - Injectable 2 milliGRAM(s) IV Push every 6 hours PRN Moderate Pain (4 - 6)      RADIOLOGY & ADDITIONAL TESTS:

## 2018-01-12 NOTE — CONSULT NOTE ADULT - ASSESSMENT
67 yof with newly dx HIV, diarrhea, norovirus, now with hypoxia, no need for ICU level care but would be concerned with PCP verse PE, fluid overload less likely    tx for pcp bactrim steriods cd4 count pending  CTA/LE dopplers to rule out PE  lesslikely fluid overload ck TTE, given dose duiretics    bicarb loss fromdiarrhea give sodium biacrb gtt    Call with concerns or change in status

## 2018-01-12 NOTE — PROGRESS NOTE ADULT - SUBJECTIVE AND OBJECTIVE BOX
[facility]  INFECTIOUS DISEASES AND INTERNAL MEDICINE at [place]  =======================================================  Raymon Ortiz MD  Diplomates American Board of Internal Medicine and Infectious Diseases  =======================================================    CLOUD SARA [id number]  Seen with      Follow up: Diarrhea    Febrile  No complaints  Still has diarrhea    Allergies:  No Known Allergies      Antibiotics:  None      REVIEW OF SYSTEMS:  CONSTITUTIONAL:  + Fever + chills  HEENT:  No diplopia or blurred vision.  No earache, sore throat or runny nose.  CARDIOVASCULAR:  No pressure, squeezing, strangling, tightness, heaviness or aching about the chest, neck, axilla or epigastrium.  RESPIRATORY:  No cough, shortness of breath  GASTROINTESTINAL:  + diarrhea.  GENITOURINARY:  No dysuria, frequency or urgency. No Blood in urine  MUSCULOSKELETAL:  no joint aches, no muscle pain  SKIN:  No change in skin, hair or nails.  NEUROLOGIC:  No paresthesias, fasciculations  PSYCHIATRIC:  No disorder of thought or mood.  ENDOCRINE:  No heat or cold intolerance  HEMATOLOGICAL:  No easy bruising or bleeding.      Physical Exam:  Vital Signs Last 24 Hrs  T(C): 39.9 (12 Jan 2018 10:45), Max: 39.9 (12 Jan 2018 10:45)  T(F): 103.9 (12 Jan 2018 10:45), Max: 103.9 (12 Jan 2018 10:45)  HR: 109 (12 Jan 2018 11:20) (79 - 114)  BP: 111/64 (12 Jan 2018 11:20) (99/62 - 149/80)  BP(mean): 110 (12 Jan 2018 04:00) (72 - 110)  RR: 23 (12 Jan 2018 11:20) (16 - 26)  SpO2: 96% (12 Jan 2018 11:20) (96% - 100%)      GEN: NAD, pleasant  HEENT: normocephalic and atraumatic. EOMI. PERRL.    NECK: Supple.   LUNGS: Clear to auscultation.  HEART: Regular rate and rhythm  ABDOMEN: Soft, nontender, and nondistended.  Positive bowel sounds.    : No CVA tenderness  EXTREMITIES: Without any edema.  MSK: No joint swelling  NEUROLOGIC: Cranial nerves II through XII are grossly intact.  PSYCHIATRIC: Appropriate affect .  SKIN: No rash      Labs:  01-12    136  |  107  |  13.0  ----------------------------<  91  4.2   |  15.0<L>  |  0.47<L>    Ca    9.1      12 Jan 2018 08:20  Phos  2.3     01-12  Mg     1.3     01-12    TPro  5.1<L>  /  Alb  2.4<L>  /  TBili  1.1  /  DBili  x   /  AST  183<H>  /  ALT  38<H>  /  AlkPhos  190<H>  01-12               10.4   3.3   )-----------( 41       ( 12 Jan 2018 08:22 )             30.1     PT/INR - ( 11 Jan 2018 09:36 )   PT: 13.5 sec;   INR: 1.22 ratio        LIVER FUNCTIONS - ( 12 Jan 2018 08:20 )  Alb: 2.4 g/dL / Pro: 5.1 g/dL / ALK PHOS: 190 U/L / ALT: 38 U/L / AST: 183 U/L / GGT: x             RECENT CULTURES:  GI PCR Panel, Stool (01.11.18 @ 08:31)    Specimen Source: .Stool Feces    Culture Results:   Norovirus GI/GII  DETECTED by PCR  *******Please Note:*******  GI panel PCR evaluates for:  Campylobacter, Plesiomonas shigelloides, Salmonella,  Vibrio, Yersinia enterocolitica, Enteroaggregative  Escherichia coli (EAEC), Enteropathogenic E.coli (EPEC),  Enterotoxigenic E. coli (ETEC) lt/st, Shiga-like  toxin-producing E. coli (STEC) stx1/stx2,  Shigella/ Enteroinvasive E. coli (EIEC), Cryptosporidium,  Cyclospora cayetanensis, Entamoeba histolytica,  Giardia lamblia, Adenovirus F 40/41, Astrovirus,  Norovirus GI/GII, Rotavirus A, Sapovirus      01-10 @ 02:43 .Urine Catheterized     No growth      01-10 @ 02:16 .Blood Arterial Catheter     No growth at 48 hours      01-10 @ 01:49 .Blood Blood     No growth at 48 hours      01-09 @ 18:36 .Stool Feces     No Protozoa seen by trichrome stain  No Helminths or Protozoa seen in formalin concentrate  performed by iodine stain  (routine O+P not evaluated for Microsporidia,  Cryptosporidia, Cyclospora, or Isospora.)  Note: One negative specimen does not rule  out the possibility of a parasitic infection.      01-09 @ 11:01 .Stool Feces     No enteric gram negative rods isolated  No enteric pathogens isolated.  (Stool culture examined for Salmonella,  Shigella, Campylobacter, Aeromonas, Plesiomonas,  Vibrio, E.coli O157 and Yersinia)      01-08 @ 19:42 .Blood Blood     No growth at 48 hours      01-08 @ 19:41 .Blood Blood     No growth at 48 hours      01-08 @ 17:48 .Stool Feces     No enteric pathogens isolated.  (Stool culture examined for Salmonella,  Shigella, Campylobacter, Aeromonas, Plesiomonas,  Vibrio, E.coli O157 and Yersinia)      01-02 @ 16:02 .Blood Blood     No growth at 5 days.      01-02 @ 16:01 .Blood Blood     No growth at 5 days.               EXAM:  US ABDOMEN LIMITED                        PROCEDURE DATE:  01/09/2018    INTERPRETATION:  CLINICAL INFORMATION: Hepatic cyst, long-standing right   upper quadrant pain, nausea and diarrhea  COMPARISON: Ultrasound dated 12/25/2017  TECHNIQUE: Sonography of the right upper quadrant.   FINDINGS:  Liver: Increased echogenicity and measures 19.1 cm in length. Calcified   left hepatic lobe cyst measures 9 mm. Calcified right hepatic lobe cyst   measures 1.1 cm.  Bile ducts: Normal caliber. Common bile duct measures 5.5 mm.   Gallbladder: Status post cholecystectomy      Pancreas: Visualized portions are within normal limits.  Right kidney: 10.1 cm. No hydronephrosis.      Ascites: None.  IVC: Visualized portions are within normal limits.  IMPRESSION:   Fatty liver. Calcified hepatic cysts      EXAM:  CT ABDOMEN AND PELVIS OC IC                        PROCEDURE DATE:  01/08/2018    INTERPRETATION:  Contrast enhanced CT of the abdomen and pelvis .  COMPARISON: 12/29/2017 normal CT scan.  CLINICAL HISTORY:    .  Technique: contiguous axial images were obtained with 2.5 mm slice   thickness after intravenous and oral contrast administration.  Coronal and sagittal reformats were also submitted for interpretation.  100 ml of Omnipaque were injected intravenously, and 0 ml were discarded,   without complications noted.   FINDINGS:   A tree-in-bud pattern of opacity seen within the periphery of right lower   lobe likely infectious in etiology.  There is no free intra-abdominal air or ascites.  Multiple lung small subhepatic abscess with a peripheral calcifications   noted in the right and left hepatic lobes likely indicating a   echinococcus cysts. Left hepatic lobe cyst measures 1.1 cm and the right   hepatic lobe cysts measure 1.1 and 0.9 cm of. No other lesions seen. Is mild hepatomegaly.  The, spleen, pancreas, adrenal glands, gallbladder are normal.  There is no intra or extrahepatic biliary ductal dilatation.  The stomach, duodenum, small and large bowel and appendix are within normal limits.  Both kidneys show normal uptake of contrast media without masses or hydronephrosis.    The urinary bladder shows normal morphology and contour.  The reproductive organs are within normal limits.   There are no retroperitoneal masses or abnormal lymphadenopathy.  The retroperitoneal vessels are normal.    There is degenerative spondylosis of thoracal lumbar spine with a   posterior disc ridge complexes@the L3-L4 T12-L1 and T11-T12 disc level   causing mild central canal stenosis .   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

## 2018-01-12 NOTE — CONSULT NOTE ADULT - SUBJECTIVE AND OBJECTIVE BOX
Patient is a 67y old  Female who presents with a chief complaint of abdominal pain fever and diarrhea (09 Jan 2018 05:07)      BRIEF HOSPITAL COURSE: ***    Events last 24 hours: ***    PAST MEDICAL & SURGICAL HISTORY:  Anemia  Sleep apnea  Myocardial infarction  GERD (gastroesophageal reflux disease)  Dyslipidemia  Hypertension  Diabetes mellitus  H/O right knee surgery: 8/20  S/P cataract extraction: B/L  S/P partial thyroidectomy  S/P tubal ligation: s/p cyst removal of left axilla  S/P cholecystectomy      Review of Systems:  CONSTITUTIONAL: No fever, chills, or fatigue  EYES: No eye pain, visual disturbances, or discharge  ENMT:  No difficulty hearing, tinnitus, vertigo; No sinus or throat pain  NECK: No pain or stiffness  RESPIRATORY: No cough, wheezing, chills or hemoptysis; No shortness of breath  CARDIOVASCULAR: No chest pain, palpitations, dizziness, or leg swelling  GASTROINTESTINAL: No abdominal or epigastric pain. No nausea, vomiting, or hematemesis; No diarrhea or constipation. No melena or hematochezia.  GENITOURINARY: No dysuria, frequency, hematuria, or incontinence  NEUROLOGICAL: No headaches, memory loss, loss of strength, numbness, or tremors  SKIN: No itching, burning, rashes, or lesions   MUSCULOSKELETAL: No joint pain or swelling; No muscle, back, or extremity pain  PSYCHIATRIC: No depression, anxiety, mood swings, or difficulty sleeping      Medications:  clotrimazole Lozenge 1 Lozenge Oral five times a day        ibuprofen  Tablet 800 milliGRAM(s) Oral every 6 hours PRN  morphine  - Injectable 2 milliGRAM(s) IV Push every 6 hours PRN        aluminum hydroxide/magnesium hydroxide/simethicone Suspension 30 milliLiter(s) Oral every 4 hours PRN      dextrose 50% Injectable 12.5 Gram(s) IV Push once  dextrose 50% Injectable 25 Gram(s) IV Push once  dextrose 50% Injectable 25 Gram(s) IV Push once  dextrose Gel 1 Dose(s) Oral once PRN  glucagon  Injectable 1 milliGRAM(s) IntraMuscular once PRN  insulin lispro (HumaLOG) corrective regimen sliding scale   SubCutaneous three times a day before meals  insulin lispro (HumaLOG) corrective regimen sliding scale   SubCutaneous at bedtime  predniSONE   Tablet 40 milliGRAM(s) Oral daily    dextrose 5%. 1000 milliLiter(s) IV Continuous <Continuous>      nystatin Cream 1 Application(s) Topical two times a day            ICU Vital Signs Last 24 Hrs  T(C): 39.3 (12 Jan 2018 20:54), Max: 39.9 (12 Jan 2018 10:45)  T(F): 102.8 (12 Jan 2018 20:54), Max: 103.9 (12 Jan 2018 10:45)  HR: 122 (12 Jan 2018 20:07) (79 - 133)  BP: 116/69 (12 Jan 2018 20:07) (97/58 - 134/91)  BP(mean): 83 (12 Jan 2018 20:07) (72 - 110)  ABP: --  ABP(mean): --  RR: 20 (12 Jan 2018 20:07) (16 - 26)  SpO2: 100% (12 Jan 2018 20:07) (96% - 100%)      ABG - ( 12 Jan 2018 18:30 )  pH: 7.39  /  pCO2: 28    /  pO2: 107   / HCO3: 19    / Base Excess: -6.9  /  SaO2: 99                  I&O's Detail    11 Jan 2018 07:01  -  12 Jan 2018 07:00  --------------------------------------------------------  IN:    lactated ringers.: 2375 mL    Oral Fluid: 720 mL    Solution: 50 mL  Total IN: 3145 mL    OUT:    Voided: 1250 mL  Total OUT: 1250 mL    Total NET: 1895 mL      12 Jan 2018 07:01  -  12 Jan 2018 21:57  --------------------------------------------------------  IN:    lactated ringers.: 1125 mL    Oral Fluid: 460 mL    Platelets - Single Donor: 296 mL    sodium bicarbonate  Infusion: 75 mL    Solution: 50 mL    Solution: 499.8 mL  Total IN: 2505.8 mL    OUT:    Voided: 1600 mL  Total OUT: 1600 mL    Total NET: 905.8 mL            LABS:                        10.4   3.3   )-----------( 41       ( 12 Jan 2018 08:22 )             30.1     01-12    136  |  107  |  13.0  ----------------------------<  91  4.2   |  15.0<L>  |  0.47<L>    Ca    9.1      12 Jan 2018 08:20  Phos  2.3     01-12  Mg     1.3     01-12    TPro  5.1<L>  /  Alb  2.4<L>  /  TBili  1.1  /  DBili  x   /  AST  183<H>  /  ALT  38<H>  /  AlkPhos  190<H>  01-12          CAPILLARY BLOOD GLUCOSE      POCT Blood Glucose.: 149 mg/dL (12 Jan 2018 21:31)    PT/INR - ( 11 Jan 2018 09:36 )   PT: 13.5 sec;   INR: 1.22 ratio             CULTURES:  Culture Results:   Norovirus GI/GII  DETECTED by PCR  *******Please Note:*******  GI panel PCR evaluates for:  Campylobacter, Plesiomonas shigelloides, Salmonella,  Vibrio, Yersinia enterocolitica, Enteroaggregative  Escherichia coli (EAEC), Enteropathogenic E.coli (EPEC),  Enterotoxigenic E. coli (ETEC) lt/st, Shiga-like  toxin-producing E. coli (STEC) stx1/stx2,  Shigella/ Enteroinvasive E. coli (EIEC), Cryptosporidium,  Cyclospora cayetanensis, Entamoeba histolytica,  Giardia lamblia, Adenovirus F 40/41, Astrovirus,  Norovirus GI/GII, Rotavirus A, Sapovirus (01-11-18 @ 08:31)  Culture Results:   No growth (01-10-18 @ 02:43)  Culture Results:   No growth at 48 hours (01-10-18 @ 02:16)  Culture Results:   No growth at 48 hours (01-10-18 @ 01:49)  Culture Results:   No Protozoa seen by trichrome stain  No Helminths or Protozoa seen in formalin concentrate  performed by iodine stain  (routine O+P not evaluated for Microsporidia,  Cryptosporidia, Cyclospora, or Isospora.)  Note: One negative specimen does not rule  out the possibility of a parasitic infection. (01-09-18 @ 18:36)  Culture Results:   Giardia antigen negative performed by rapid immunoassay (non-enzymatic).  (It is recommended that all specimens tested by  an immunochromatographic card test be  confirmed by another method.) (01-09-18 @ 18:36)  Culture Results:   No Protozoa seen by trichrome stain  No Helminths or Protozoa seen in formalin concentrate  performed by iodine stain  (routine O+P not evaluated for Microsporidia,  Cryptosporidia, Cyclospora, or Isospora.)  Note: One negative specimen does not rule  out the possibility of a parasitic infection. (01-09-18 @ 18:36)  Culture Results:   No enteric gram negative rods isolated  No enteric pathogens isolated.  (Stool culture examined for Salmonella,  Shigella, Campylobacter, Aeromonas, Plesiomonas,  Vibrio, E.coli O157 and Yersinia) (01-09-18 @ 11:01)  C Diff by PCR Result: NotDetec (01-09-18 @ 11:00)  Culture Results:   No growth at 48 hours (01-08-18 @ 19:42)  Culture Results:   No growth at 48 hours (01-08-18 @ 19:41)  Culture Results:   No enteric pathogens isolated.  (Stool culture examined for Salmonella,  Shigella, Campylobacter, Aeromonas, Plesiomonas,  Vibrio, E.coli O157 and Yersinia) (01-08-18 @ 17:48)      Physical Examination:    General: No acute distress.  Alert, oriented, interactive, nonfocal    HEENT: Pupils equal, reactive to light.  Symmetric.    PULM: Clear to auscultation bilaterally, no significant sputum production    CVS: Regular rate and rhythm, no murmurs, rubs, or gallops    ABD: Soft, nondistended, nontender, normoactive bowel sounds, no masses    EXT: No edema, nontender    SKIN: Warm and well perfused, no rashes noted.    RADIOLOGY: ***    CRITICAL CARE TIME SPENT: *** Patient is a 67y old  Female who presents with a chief complaint of abdominal pain fever and diarrhea (09 Jan 2018 05:07)      BRIEF HOSPITAL COURSE: Pt is a 67 yof with norovirus, recent infection with ESBL UTI, continued diarrhea, newly dx HIV now with increasing hypoxia. Patietn denies CP, mild nausea, and lightheaded. She continues to have diarrhea. She is currently requiring 5 liter NC with o2 sat 100%, prior she was on RA.    PAST MEDICAL & SURGICAL HISTORY:  Anemia  Sleep apnea  Myocardial infarction  GERD (gastroesophageal reflux disease)  Dyslipidemia  Hypertension  Diabetes mellitus  H/O right knee surgery: 8/20  S/P cataract extraction: B/L  S/P partial thyroidectomy  S/P tubal ligation: s/p cyst removal of left axilla  S/P cholecystectomy      Review of Systems:  above         Medications:  clotrimazole Lozenge 1 Lozenge Oral five times a day        ibuprofen  Tablet 800 milliGRAM(s) Oral every 6 hours PRN  morphine  - Injectable 2 milliGRAM(s) IV Push every 6 hours PRN        aluminum hydroxide/magnesium hydroxide/simethicone Suspension 30 milliLiter(s) Oral every 4 hours PRN      dextrose 50% Injectable 12.5 Gram(s) IV Push once  dextrose 50% Injectable 25 Gram(s) IV Push once  dextrose 50% Injectable 25 Gram(s) IV Push once  dextrose Gel 1 Dose(s) Oral once PRN  glucagon  Injectable 1 milliGRAM(s) IntraMuscular once PRN  insulin lispro (HumaLOG) corrective regimen sliding scale   SubCutaneous three times a day before meals  insulin lispro (HumaLOG) corrective regimen sliding scale   SubCutaneous at bedtime  predniSONE   Tablet 40 milliGRAM(s) Oral daily    dextrose 5%. 1000 milliLiter(s) IV Continuous <Continuous>      nystatin Cream 1 Application(s) Topical two times a day            ICU Vital Signs Last 24 Hrs  T(C): 39.3 (12 Jan 2018 20:54), Max: 39.9 (12 Jan 2018 10:45)  T(F): 102.8 (12 Jan 2018 20:54), Max: 103.9 (12 Jan 2018 10:45)  HR: 122 (12 Jan 2018 20:07) (79 - 133)  BP: 116/69 (12 Jan 2018 20:07) (97/58 - 134/91)  BP(mean): 83 (12 Jan 2018 20:07) (72 - 110)  ABP: --  ABP(mean): --  RR: 20 (12 Jan 2018 20:07) (16 - 26)  SpO2: 100% (12 Jan 2018 20:07) (96% - 100%)      ABG - ( 12 Jan 2018 18:30 )  pH: 7.39  /  pCO2: 28    /  pO2: 107   / HCO3: 19    / Base Excess: -6.9  /  SaO2: 99                  I&O's Detail    11 Jan 2018 07:01  -  12 Jan 2018 07:00  --------------------------------------------------------  IN:    lactated ringers.: 2375 mL    Oral Fluid: 720 mL    Solution: 50 mL  Total IN: 3145 mL    OUT:    Voided: 1250 mL  Total OUT: 1250 mL    Total NET: 1895 mL      12 Jan 2018 07:01  -  12 Jan 2018 21:57  --------------------------------------------------------  IN:    lactated ringers.: 1125 mL    Oral Fluid: 460 mL    Platelets - Single Donor: 296 mL    sodium bicarbonate  Infusion: 75 mL    Solution: 50 mL    Solution: 499.8 mL  Total IN: 2505.8 mL    OUT:    Voided: 1600 mL  Total OUT: 1600 mL    Total NET: 905.8 mL            LABS:                        10.4   3.3   )-----------( 41       ( 12 Jan 2018 08:22 )             30.1     01-12    136  |  107  |  13.0  ----------------------------<  91  4.2   |  15.0<L>  |  0.47<L>    Ca    9.1      12 Jan 2018 08:20  Phos  2.3     01-12  Mg     1.3     01-12    TPro  5.1<L>  /  Alb  2.4<L>  /  TBili  1.1  /  DBili  x   /  AST  183<H>  /  ALT  38<H>  /  AlkPhos  190<H>  01-12          CAPILLARY BLOOD GLUCOSE      POCT Blood Glucose.: 149 mg/dL (12 Jan 2018 21:31)    PT/INR - ( 11 Jan 2018 09:36 )   PT: 13.5 sec;   INR: 1.22 ratio             CULTURES:  Culture Results:   Norovirus GI/GII  DETECTED by PCR  *******Please Note:*******  GI panel PCR evaluates for:  Campylobacter, Plesiomonas shigelloides, Salmonella,  Vibrio, Yersinia enterocolitica, Enteroaggregative  Escherichia coli (EAEC), Enteropathogenic E.coli (EPEC),  Enterotoxigenic E. coli (ETEC) lt/st, Shiga-like  toxin-producing E. coli (STEC) stx1/stx2,  Shigella/ Enteroinvasive E. coli (EIEC), Cryptosporidium,  Cyclospora cayetanensis, Entamoeba histolytica,  Giardia lamblia, Adenovirus F 40/41, Astrovirus,  Norovirus GI/GII, Rotavirus A, Sapovirus (01-11-18 @ 08:31)  Culture Results:   No growth (01-10-18 @ 02:43)  Culture Results:   No growth at 48 hours (01-10-18 @ 02:16)  Culture Results:   No growth at 48 hours (01-10-18 @ 01:49)  Culture Results:   No Protozoa seen by trichrome stain  No Helminths or Protozoa seen in formalin concentrate  performed by iodine stain  (routine O+P not evaluated for Microsporidia,  Cryptosporidia, Cyclospora, or Isospora.)  Note: One negative specimen does not rule  out the possibility of a parasitic infection. (01-09-18 @ 18:36)  Culture Results:   Giardia antigen negative performed by rapid immunoassay (non-enzymatic).  (It is recommended that all specimens tested by  an immunochromatographic card test be  confirmed by another method.) (01-09-18 @ 18:36)  Culture Results:   No Protozoa seen by trichrome stain  No Helminths or Protozoa seen in formalin concentrate  performed by iodine stain  (routine O+P not evaluated for Microsporidia,  Cryptosporidia, Cyclospora, or Isospora.)  Note: One negative specimen does not rule  out the possibility of a parasitic infection. (01-09-18 @ 18:36)  Culture Results:   No enteric gram negative rods isolated  No enteric pathogens isolated.  (Stool culture examined for Salmonella,  Shigella, Campylobacter, Aeromonas, Plesiomonas,  Vibrio, E.coli O157 and Yersinia) (01-09-18 @ 11:01)  C Diff by PCR Result: NotDetec (01-09-18 @ 11:00)  Culture Results:   No growth at 48 hours (01-08-18 @ 19:42)  Culture Results:   No growth at 48 hours (01-08-18 @ 19:41)  Culture Results:   No enteric pathogens isolated.  (Stool culture examined for Salmonella,  Shigella, Campylobacter, Aeromonas, Plesiomonas,  Vibrio, E.coli O157 and Yersinia) (01-08-18 @ 17:48)      Physical Examination:    General: No acute distress.  Alert, oriented, interactive, nonfocal    HEENT: Pupils equal, reactive to light.  Symmetric.    PULM: slight wheeze    CVS: Regular rate and rhythm, no murmurs, rubs, or gallops    ABD: Soft, nondistended, nontender, normoactive bowel sounds, no masses    EXT: No edema, nontender    SKIN: Warm and well perfused, no rashes noted.    RADIOLOGY: cxr unremarkable

## 2018-01-12 NOTE — CHART NOTE - NSCHARTNOTEFT_GEN_A_CORE
Called by RN because of tachycardia to 140s, tachypnea to 40s and pt with increased oxygen needs, from 2L N/C at baseline to now 4L with notable respiratory distress.  Pt seen and examined at bedside in respiratory distress, now more lethargic but arousable.  Labs & chart reviewed.  Family at bedside.  Pt complaining of difficulty breathing, no chest pain, lightheadedness, dizziness, palpitations or LOC.  No orthopnea, PND, leg swelling, or claudication.    Phys exam  Gen mod respiratory distress, lethargic arousable  Eyes PERRL, EOMI  Neck supple, no JVD or carotid bruit b/l  Chest b/l crackles that don't clear with cough; no rales or wheezing  CVS tachycardic, no MRG  Abd soft NT ND +BS  Ext no C/c/e  CNS grossly non focal  Psych appropriate mood and affect, albeit lethargic    1) respiratory distress - empiric Lasix 40 stat IVP as pt has been receiving 125 cc/h over the past day and may be hypervolemic; stat ABG & CXR.  ABG w/ respiratory alkalosis, as expected  d/w Attending, agreed to consult ICU.  ICU Attending evaluated pt, discussed case w/ patient and family, SEE NOTE  w/ new onset HIV, as CXR without evidence now of congestion or gross consolidations, possible atelectasis, possible interstitial disease,   will empirically tx for PCP PNA with Bactrim & steroids - will consider CT Chest  Continuing care otherwise

## 2018-01-12 NOTE — PROGRESS NOTE ADULT - PROBLEM SELECTOR PLAN 1
Stool PCR positive for Norovirus  Stool O&P Negative  Stool AFB negative  stool Giardia Ag negative  C Diff is negative  Stool culture negative

## 2018-01-12 NOTE — PROGRESS NOTE ADULT - PROBLEM SELECTOR PLAN 2
possibly reactive versus chronic liver disease although no chronic Hep B or C and no acute A. Awaiting AMA.

## 2018-01-12 NOTE — PROGRESS NOTE ADULT - PROBLEM SELECTOR PLAN 2
Serology negative  CT abd/pelvis showed calcified cysts within the right left hepatic lobes are likely indicating echinococcus cysts.   RUQ US showing fatty liver with multiple calcified hepatic cysts.    ID consult appreciated  GI consult appreciated

## 2018-01-12 NOTE — PROGRESS NOTE ADULT - SUBJECTIVE AND OBJECTIVE BOX
Pt seen and examined f/u diarrhea and elevated LFTs  This morning still with diarrhea with one episode of nausea and vomiting yesterday. Stool PCR positive for norovirus. Febrile to 101.5 yesterday but now afebrile.    REVIEW OF SYSTEMS:    CONSTITUTIONAL: No fever, weight loss, or fatigue  EYES: No eye pain, visual disturbances, or discharge  ENMT:  No difficulty hearing, tinnitus, vertigo; No sinus or throat pain  RESPIRATORY: No cough, wheezing, chills or hemoptysis; No shortness of breath  CARDIOVASCULAR: No chest pain, palpitations, dizziness, or leg swelling  GASTROINTESTINAL: as above    MEDICATIONS:  MEDICATIONS  (STANDING):  clotrimazole Lozenge 1 Lozenge Oral five times a day  dextrose 5%. 1000 milliLiter(s) (50 mL/Hr) IV Continuous <Continuous>  dextrose 50% Injectable 12.5 Gram(s) IV Push once  dextrose 50% Injectable 25 Gram(s) IV Push once  dextrose 50% Injectable 25 Gram(s) IV Push once  insulin lispro (HumaLOG) corrective regimen sliding scale   SubCutaneous three times a day before meals  insulin lispro (HumaLOG) corrective regimen sliding scale   SubCutaneous at bedtime  lactated ringers. 1000 milliLiter(s) (125 mL/Hr) IV Continuous <Continuous>    MEDICATIONS  (PRN):  aluminum hydroxide/magnesium hydroxide/simethicone Suspension 30 milliLiter(s) Oral every 4 hours PRN Dyspepsia  dextrose Gel 1 Dose(s) Oral once PRN Blood Glucose LESS THAN 70 milliGRAM(s)/deciliter  glucagon  Injectable 1 milliGRAM(s) IntraMuscular once PRN Glucose LESS THAN 70 milligrams/deciliter  ibuprofen  Tablet 600 milliGRAM(s) Oral every 6 hours PRN Tmax 100.4  morphine  - Injectable 2 milliGRAM(s) IV Push every 6 hours PRN Moderate Pain (4 - 6)      Allergies    No Known Allergies    Intolerances        Vital Signs Last 24 Hrs  T(C): 37.5 (12 Jan 2018 05:11), Max: 39.6 (11 Jan 2018 11:52)  T(F): 99.5 (12 Jan 2018 05:11), Max: 103.2 (11 Jan 2018 11:52)  HR: 92 (12 Jan 2018 04:00) (79 - 114)  BP: 126/103 (12 Jan 2018 04:00) (99/62 - 149/80)  BP(mean): 110 (12 Jan 2018 04:00) (72 - 110)  RR: 23 (12 Jan 2018 04:00) (16 - 26)  SpO2: 100% (12 Jan 2018 04:00) (98% - 100%)    01-11 @ 07:01  -  01-12 @ 07:00  --------------------------------------------------------  IN: 2770 mL / OUT: 1250 mL / NET: 1520 mL        PHYSICAL EXAM:    General: Well developed; well nourished; in no acute distress  HEENT: MMM, conjunctiva and sclera clear  Gastrointestinal:Abdomen: Soft non-tender non-distended; Normal bowel sounds; No hepatosplenomegaly  Extremities: no cyanosis, clubbing or edema.  Skin: Warm and dry. No obvious rash    LABS:      CBC Full  -  ( 11 Jan 2018 18:20 )  WBC Count : 4.4 K/uL  Hemoglobin : 12.0 g/dL  Hematocrit : 34.9 %  Platelet Count - Automated : 42 K/uL  Mean Cell Volume : 78.3 fl  Mean Cell Hemoglobin : 26.9 pg  Mean Cell Hemoglobin Concentration : 34.4 g/dL  Auto Neutrophil # : x  Auto Lymphocyte # : x  Auto Monocyte # : x  Auto Eosinophil # : x  Auto Basophil # : x  Auto Neutrophil % : x  Auto Lymphocyte % : x  Auto Monocyte % : x  Auto Eosinophil % : x  Auto Basophil % : x    01-11    138  |  106  |  13.0  ----------------------------<  110  3.6   |  16.0<L>  |  0.62    Ca    9.1      11 Jan 2018 09:36  Phos  2.8     01-11  Mg     1.4     01-11    TPro  5.8<L>  /  Alb  2.8<L>  /  TBili  0.9  /  DBili  0.5<H>  /  AST  153<H>  /  ALT  37<H>  /  AlkPhos  175<H>  01-11    PT/INR - ( 11 Jan 2018 09:36 )   PT: 13.5 sec;   INR: 1.22 ratio

## 2018-01-13 DIAGNOSIS — J96.01 ACUTE RESPIRATORY FAILURE WITH HYPOXIA: ICD-10-CM

## 2018-01-13 LAB
ALBUMIN SERPL ELPH-MCNC: 2.5 G/DL — LOW (ref 3.3–5.2)
ALP SERPL-CCNC: 192 U/L — HIGH (ref 40–120)
ALT FLD-CCNC: 39 U/L — HIGH
ANA PAT FLD IF-IMP: ABNORMAL
ANA TITR SER: ABNORMAL
ANION GAP SERPL CALC-SCNC: 14 MMOL/L — SIGNIFICANT CHANGE UP (ref 5–17)
AST SERPL-CCNC: 175 U/L — HIGH
BILIRUB DIRECT SERPL-MCNC: 1 MG/DL — HIGH (ref 0–0.3)
BILIRUB INDIRECT FLD-MCNC: 0.3 MG/DL — SIGNIFICANT CHANGE UP (ref 0.2–1)
BILIRUB SERPL-MCNC: 1.3 MG/DL — SIGNIFICANT CHANGE UP (ref 0.4–2)
BUN SERPL-MCNC: 14 MG/DL — SIGNIFICANT CHANGE UP (ref 8–20)
CALCIUM SERPL-MCNC: 8.9 MG/DL — SIGNIFICANT CHANGE UP (ref 8.6–10.2)
CHLORIDE SERPL-SCNC: 103 MMOL/L — SIGNIFICANT CHANGE UP (ref 98–107)
CK SERPL-CCNC: 64 U/L — SIGNIFICANT CHANGE UP (ref 25–170)
CO2 SERPL-SCNC: 18 MMOL/L — LOW (ref 22–29)
CREAT SERPL-MCNC: 0.51 MG/DL — SIGNIFICANT CHANGE UP (ref 0.5–1.3)
CULTURE RESULTS: SIGNIFICANT CHANGE UP
CULTURE RESULTS: SIGNIFICANT CHANGE UP
GLUCOSE BLDC GLUCOMTR-MCNC: 105 MG/DL — HIGH (ref 70–99)
GLUCOSE BLDC GLUCOMTR-MCNC: 126 MG/DL — HIGH (ref 70–99)
GLUCOSE BLDC GLUCOMTR-MCNC: 228 MG/DL — HIGH (ref 70–99)
GLUCOSE SERPL-MCNC: 247 MG/DL — HIGH (ref 70–115)
HCT VFR BLD CALC: 29.6 % — LOW (ref 37–47)
HGB BLD-MCNC: 10.2 G/DL — LOW (ref 12–16)
LDH SERPL L TO P-CCNC: 741 U/L — HIGH (ref 98–192)
MAGNESIUM SERPL-MCNC: 1.4 MG/DL — LOW (ref 1.6–2.6)
MAGNESIUM SERPL-MCNC: 2.1 MG/DL — SIGNIFICANT CHANGE UP (ref 1.6–2.6)
MCHC RBC-ENTMCNC: 26.6 PG — LOW (ref 27–31)
MCHC RBC-ENTMCNC: 34.5 G/DL — SIGNIFICANT CHANGE UP (ref 32–36)
MCV RBC AUTO: 77.3 FL — LOW (ref 81–99)
PHOSPHATE SERPL-MCNC: 3.2 MG/DL — SIGNIFICANT CHANGE UP (ref 2.4–4.7)
PHOSPHATE SERPL-MCNC: 4.2 MG/DL — SIGNIFICANT CHANGE UP (ref 2.4–4.7)
PLATELET # BLD AUTO: 40 K/UL — LOW (ref 150–400)
POTASSIUM SERPL-MCNC: 3.8 MMOL/L — SIGNIFICANT CHANGE UP (ref 3.5–5.3)
POTASSIUM SERPL-SCNC: 3.8 MMOL/L — SIGNIFICANT CHANGE UP (ref 3.5–5.3)
PROCALCITONIN SERPL-MCNC: 2.37 NG/ML — HIGH (ref 0–0.04)
PROT SERPL-MCNC: 5 G/DL — LOW (ref 6.6–8.7)
RBC # BLD: 3.83 M/UL — LOW (ref 4.4–5.2)
RBC # FLD: 15 % — SIGNIFICANT CHANGE UP (ref 11–15.6)
SODIUM SERPL-SCNC: 135 MMOL/L — SIGNIFICANT CHANGE UP (ref 135–145)
SPECIMEN SOURCE: SIGNIFICANT CHANGE UP
SPECIMEN SOURCE: SIGNIFICANT CHANGE UP
TROPONIN T SERPL-MCNC: <0.01 NG/ML — SIGNIFICANT CHANGE UP (ref 0–0.06)
VZV IGM SER-ACNC: <0.91 INDEX — SIGNIFICANT CHANGE UP (ref 0–0.9)
WBC # BLD: 4.7 K/UL — LOW (ref 4.8–10.8)
WBC # FLD AUTO: 4.7 K/UL — LOW (ref 4.8–10.8)

## 2018-01-13 PROCEDURE — 93010 ELECTROCARDIOGRAM REPORT: CPT | Mod: 76

## 2018-01-13 PROCEDURE — 71275 CT ANGIOGRAPHY CHEST: CPT | Mod: 26

## 2018-01-13 PROCEDURE — 99233 SBSQ HOSP IP/OBS HIGH 50: CPT

## 2018-01-13 PROCEDURE — 93306 TTE W/DOPPLER COMPLETE: CPT | Mod: 26

## 2018-01-13 PROCEDURE — 99232 SBSQ HOSP IP/OBS MODERATE 35: CPT

## 2018-01-13 RX ORDER — SODIUM BICARBONATE 1 MEQ/ML
650 SYRINGE (ML) INTRAVENOUS
Qty: 0 | Refills: 0 | Status: COMPLETED | OUTPATIENT
Start: 2018-01-13 | End: 2018-01-16

## 2018-01-13 RX ORDER — MAGNESIUM SULFATE 500 MG/ML
1 VIAL (ML) INJECTION ONCE
Qty: 0 | Refills: 0 | Status: DISCONTINUED | OUTPATIENT
Start: 2018-01-13 | End: 2018-01-13

## 2018-01-13 RX ORDER — METOPROLOL TARTRATE 50 MG
2.5 TABLET ORAL EVERY 6 HOURS
Qty: 0 | Refills: 0 | Status: DISCONTINUED | OUTPATIENT
Start: 2018-01-13 | End: 2018-01-15

## 2018-01-13 RX ORDER — MAGNESIUM OXIDE 400 MG ORAL TABLET 241.3 MG
400 TABLET ORAL
Qty: 0 | Refills: 0 | Status: DISCONTINUED | OUTPATIENT
Start: 2018-01-13 | End: 2018-01-13

## 2018-01-13 RX ORDER — POTASSIUM CHLORIDE 20 MEQ
20 PACKET (EA) ORAL ONCE
Qty: 0 | Refills: 0 | Status: COMPLETED | OUTPATIENT
Start: 2018-01-13 | End: 2018-01-13

## 2018-01-13 RX ORDER — MAGNESIUM SULFATE 500 MG/ML
2 VIAL (ML) INJECTION ONCE
Qty: 0 | Refills: 0 | Status: COMPLETED | OUTPATIENT
Start: 2018-01-13 | End: 2018-01-13

## 2018-01-13 RX ADMIN — Medication 50 GRAM(S): at 16:00

## 2018-01-13 RX ADMIN — Medication 2.5 MILLIGRAM(S): at 15:45

## 2018-01-13 RX ADMIN — NYSTATIN CREAM 1 APPLICATION(S): 100000 CREAM TOPICAL at 06:14

## 2018-01-13 RX ADMIN — MAGNESIUM OXIDE 400 MG ORAL TABLET 400 MILLIGRAM(S): 241.3 TABLET ORAL at 16:00

## 2018-01-13 RX ADMIN — Medication 20 MILLIEQUIVALENT(S): at 16:00

## 2018-01-13 RX ADMIN — Medication 1 LOZENGE: at 16:00

## 2018-01-13 RX ADMIN — Medication 2: at 06:26

## 2018-01-13 RX ADMIN — Medication 2 TABLET(S): at 06:14

## 2018-01-13 RX ADMIN — Medication 1 LOZENGE: at 01:16

## 2018-01-13 RX ADMIN — NYSTATIN CREAM 1 APPLICATION(S): 100000 CREAM TOPICAL at 17:14

## 2018-01-13 RX ADMIN — Medication 650 MILLIGRAM(S): at 17:14

## 2018-01-13 RX ADMIN — Medication 1 LOZENGE: at 21:39

## 2018-01-13 NOTE — PROGRESS NOTE ADULT - SUBJECTIVE AND OBJECTIVE BOX
Pt seen and examined:  f/u diarrhea in patient HIV positive  This morning she states that the diarrhea is slightly improved. No further vomiting. CTA negative fpr PE. + norovirus gastroenteritis. No abdominal pain.    REVIEW OF SYSTEMS:    CONSTITUTIONAL: No fever, weight loss, or fatigue  EYES: No eye pain, visual disturbances, or discharge  ENMT:  No difficulty hearing, tinnitus, vertigo; No sinus or throat pain  RESPIRATORY: No cough, wheezing, chills or hemoptysis; No shortness of breath  CARDIOVASCULAR: No chest pain, palpitations, dizziness, or leg swelling  GASTROINTESTINAL: as above    MEDICATIONS:  MEDICATIONS  (STANDING):  clotrimazole Lozenge 1 Lozenge Oral five times a day  dextrose 5%. 1000 milliLiter(s) (50 mL/Hr) IV Continuous <Continuous>  dextrose 50% Injectable 12.5 Gram(s) IV Push once  dextrose 50% Injectable 25 Gram(s) IV Push once  dextrose 50% Injectable 25 Gram(s) IV Push once  insulin lispro (HumaLOG) corrective regimen sliding scale   SubCutaneous three times a day before meals  insulin lispro (HumaLOG) corrective regimen sliding scale   SubCutaneous at bedtime  nystatin Cream 1 Application(s) Topical two times a day  predniSONE   Tablet 40 milliGRAM(s) Oral daily  trimethoprim  160 mG/sulfamethoxazole 800 mG 2 Tablet(s) Oral every 8 hours    MEDICATIONS  (PRN):  aluminum hydroxide/magnesium hydroxide/simethicone Suspension 30 milliLiter(s) Oral every 4 hours PRN Dyspepsia  dextrose Gel 1 Dose(s) Oral once PRN Blood Glucose LESS THAN 70 milliGRAM(s)/deciliter  glucagon  Injectable 1 milliGRAM(s) IntraMuscular once PRN Glucose LESS THAN 70 milligrams/deciliter  ibuprofen  Tablet 800 milliGRAM(s) Oral every 6 hours PRN Tmax 100.4  morphine  - Injectable 2 milliGRAM(s) IV Push every 6 hours PRN Moderate Pain (4 - 6)      Allergies    No Known Allergies    Intolerances        Vital Signs Last 24 Hrs  T(C): 36.3 (13 Jan 2018 06:13), Max: 39.9 (12 Jan 2018 10:45)  T(F): 97.4 (13 Jan 2018 06:13), Max: 103.9 (12 Jan 2018 10:45)  HR: 64 (13 Jan 2018 06:13) (64 - 133)  BP: 120/54 (13 Jan 2018 06:13) (97/58 - 134/91)  BP(mean): 74 (13 Jan 2018 06:13) (74 - 86)  RR: 19 (13 Jan 2018 06:13) (13 - 23)  SpO2: 100% (13 Jan 2018 06:13) (96% - 100%)    01-12 @ 07:01  -  01-13 @ 07:00  --------------------------------------------------------  IN: 2505.8 mL / OUT: 2500 mL / NET: 5.8 mL        PHYSICAL EXAM:    General: Well developed; well nourished; in no acute distress  HEENT: MMM, conjunctiva and sclera clear  Gastrointestinal:Abdomen: Soft non-tender non-distended; Normal bowel sounds; No hepatosplenomegaly  Extremities: no cyanosis, clubbing or edema.  Skin: Warm and dry. No obvious rash    LABS:  ABG - ( 12 Jan 2018 18:30 )  pH: 7.39  /  pCO2: 28    /  pO2: 107   / HCO3: 19    / Base Excess: -6.9  /  SaO2: 99                  CBC Full  -  ( 12 Jan 2018 08:22 )  WBC Count : 3.3 K/uL  Hemoglobin : 10.4 g/dL  Hematocrit : 30.1 %  Platelet Count - Automated : 41 K/uL  Mean Cell Volume : 77.4 fl  Mean Cell Hemoglobin : 26.7 pg  Mean Cell Hemoglobin Concentration : 34.6 g/dL  Auto Neutrophil # : 2.6 K/uL  Auto Lymphocyte # : 0.2 K/uL  Auto Monocyte # : 0.0 K/uL  Auto Eosinophil # : 0.1 K/uL  Auto Basophil # : x  Auto Neutrophil % : 83.0 %  Auto Lymphocyte % : 8.0 %  Auto Monocyte % : 1.0 %  Auto Eosinophil % : 2.0 %  Auto Basophil % : x    01-12    136  |  107  |  13.0  ----------------------------<  91  4.2   |  15.0<L>  |  0.47<L>    Ca    9.1      12 Jan 2018 08:20  Phos  2.3     01-12  Mg     1.3     01-12    TPro  5.1<L>  /  Alb  2.4<L>  /  TBili  1.1  /  DBili  x   /  AST  183<H>  /  ALT  38<H>  /  AlkPhos  190<H>  01-12    PT/INR - ( 11 Jan 2018 09:36 )   PT: 13.5 sec;   INR: 1.22 ratio                           RADIOLOGY & ADDITIONAL STUDIES (The following images were personally reviewed):  < from: CT Angio Chest w/ IV Cont (01.13.18 @ 00:39) >  COMPARISON: CTA chest 8/28/2017    TECHNIQUE: Thoracic CT scan with pulmonary embolism protocol was   performed with the administration of intravenous contrast.    Total of 58 cc of Omnipaque 350 was injected intravenously. Coronal,   sagittal, and sagittal oblique reformations were obtained. Axial MIP   images were obtained.    FINDINGS:     There is no evidence of pulmonary embolism.     The thoracic aorta and main pulmonary artery are normal in caliber. The   heart is normal size.      Peripherally oriented subpleural airspace opacities in the left lung are  nonspecific and may reflect areas of infectious or inflammatory process,   not limited to entity such as septic emboli.    Minimal bibasilar dependent changes are seen posteriorly.     Small right pleural effusion noted. No pericardial effusion. There is no   pneumothorax or pneumomediastinum.    There is no axillary, mediastinal, or hilar lymphadenopathy.     Limited images of the upper abdomen shows nonspecific indeterminate   hepatic dome rim calcification, unchanged.    Advanced multilevel thoracic spondylosis. T6 osseous measurement noted.    IMPRESSION:     No evidence of pulmonary embolism.    Peripherally oriented subpleural left-sided airspace opacities may   reflect foci of infection. Small right pleural effusion. Follow-up to   resolution.                MARANDA SOLANO M.D., ATTENDING RADIOLOGIST  This document has been electronically signed. Jan 13 2018  1:27AM          < end of copied text > Pt seen and examined:  f/u diarrhea in patient HIV positive  This morning she states that the diarrhea is slightly improved. No further vomiting. CTA negative for PE. + norovirus gastroenteritis. No abdominal pain. T max last nite 102.8.    REVIEW OF SYSTEMS:    CONSTITUTIONAL: No fever, weight loss, or fatigue  EYES: No eye pain, visual disturbances, or discharge  ENMT:  No difficulty hearing, tinnitus, vertigo; No sinus or throat pain  RESPIRATORY: No cough, wheezing, chills or hemoptysis; No shortness of breath  CARDIOVASCULAR: No chest pain, palpitations, dizziness, or leg swelling  GASTROINTESTINAL: as above    MEDICATIONS:  MEDICATIONS  (STANDING):  clotrimazole Lozenge 1 Lozenge Oral five times a day  dextrose 5%. 1000 milliLiter(s) (50 mL/Hr) IV Continuous <Continuous>  dextrose 50% Injectable 12.5 Gram(s) IV Push once  dextrose 50% Injectable 25 Gram(s) IV Push once  dextrose 50% Injectable 25 Gram(s) IV Push once  insulin lispro (HumaLOG) corrective regimen sliding scale   SubCutaneous three times a day before meals  insulin lispro (HumaLOG) corrective regimen sliding scale   SubCutaneous at bedtime  nystatin Cream 1 Application(s) Topical two times a day  predniSONE   Tablet 40 milliGRAM(s) Oral daily  trimethoprim  160 mG/sulfamethoxazole 800 mG 2 Tablet(s) Oral every 8 hours    MEDICATIONS  (PRN):  aluminum hydroxide/magnesium hydroxide/simethicone Suspension 30 milliLiter(s) Oral every 4 hours PRN Dyspepsia  dextrose Gel 1 Dose(s) Oral once PRN Blood Glucose LESS THAN 70 milliGRAM(s)/deciliter  glucagon  Injectable 1 milliGRAM(s) IntraMuscular once PRN Glucose LESS THAN 70 milligrams/deciliter  ibuprofen  Tablet 800 milliGRAM(s) Oral every 6 hours PRN Tmax 100.4  morphine  - Injectable 2 milliGRAM(s) IV Push every 6 hours PRN Moderate Pain (4 - 6)      Allergies    No Known Allergies    Intolerances        Vital Signs Last 24 Hrs  T(C): 36.3 (13 Jan 2018 06:13), Max: 39.9 (12 Jan 2018 10:45)  T(F): 97.4 (13 Jan 2018 06:13), Max: 103.9 (12 Jan 2018 10:45)  HR: 64 (13 Jan 2018 06:13) (64 - 133)  BP: 120/54 (13 Jan 2018 06:13) (97/58 - 134/91)  BP(mean): 74 (13 Jan 2018 06:13) (74 - 86)  RR: 19 (13 Jan 2018 06:13) (13 - 23)  SpO2: 100% (13 Jan 2018 06:13) (96% - 100%)    01-12 @ 07:01  -  01-13 @ 07:00  --------------------------------------------------------  IN: 2505.8 mL / OUT: 2500 mL / NET: 5.8 mL        PHYSICAL EXAM:    General: Well developed; well nourished; in no acute distress  HEENT: MMM, conjunctiva and sclera clear  Gastrointestinal:Abdomen: Soft non-tender non-distended; Normal bowel sounds; No hepatosplenomegaly  Extremities: no cyanosis, clubbing or edema.  Skin: Warm and dry. No obvious rash    LABS:  ABG - ( 12 Jan 2018 18:30 )  pH: 7.39  /  pCO2: 28    /  pO2: 107   / HCO3: 19    / Base Excess: -6.9  /  SaO2: 99                  CBC Full  -  ( 12 Jan 2018 08:22 )  WBC Count : 3.3 K/uL  Hemoglobin : 10.4 g/dL  Hematocrit : 30.1 %  Platelet Count - Automated : 41 K/uL  Mean Cell Volume : 77.4 fl  Mean Cell Hemoglobin : 26.7 pg  Mean Cell Hemoglobin Concentration : 34.6 g/dL  Auto Neutrophil # : 2.6 K/uL  Auto Lymphocyte # : 0.2 K/uL  Auto Monocyte # : 0.0 K/uL  Auto Eosinophil # : 0.1 K/uL  Auto Basophil # : x  Auto Neutrophil % : 83.0 %  Auto Lymphocyte % : 8.0 %  Auto Monocyte % : 1.0 %  Auto Eosinophil % : 2.0 %  Auto Basophil % : x    01-12    136  |  107  |  13.0  ----------------------------<  91  4.2   |  15.0<L>  |  0.47<L>    Ca    9.1      12 Jan 2018 08:20  Phos  2.3     01-12  Mg     1.3     01-12    TPro  5.1<L>  /  Alb  2.4<L>  /  TBili  1.1  /  DBili  x   /  AST  183<H>  /  ALT  38<H>  /  AlkPhos  190<H>  01-12    PT/INR - ( 11 Jan 2018 09:36 )   PT: 13.5 sec;   INR: 1.22 ratio                           RADIOLOGY & ADDITIONAL STUDIES (The following images were personally reviewed):  < from: CT Angio Chest w/ IV Cont (01.13.18 @ 00:39) >  COMPARISON: CTA chest 8/28/2017    TECHNIQUE: Thoracic CT scan with pulmonary embolism protocol was   performed with the administration of intravenous contrast.    Total of 58 cc of Omnipaque 350 was injected intravenously. Coronal,   sagittal, and sagittal oblique reformations were obtained. Axial MIP   images were obtained.    FINDINGS:     There is no evidence of pulmonary embolism.     The thoracic aorta and main pulmonary artery are normal in caliber. The   heart is normal size.      Peripherally oriented subpleural airspace opacities in the left lung are  nonspecific and may reflect areas of infectious or inflammatory process,   not limited to entity such as septic emboli.    Minimal bibasilar dependent changes are seen posteriorly.     Small right pleural effusion noted. No pericardial effusion. There is no   pneumothorax or pneumomediastinum.    There is no axillary, mediastinal, or hilar lymphadenopathy.     Limited images of the upper abdomen shows nonspecific indeterminate   hepatic dome rim calcification, unchanged.    Advanced multilevel thoracic spondylosis. T6 osseous measurement noted.    IMPRESSION:     No evidence of pulmonary embolism.    Peripherally oriented subpleural left-sided airspace opacities may   reflect foci of infection. Small right pleural effusion. Follow-up to   resolution.                MARANDA SOLANO M.D., ATTENDING RADIOLOGIST  This document has been electronically signed. Jan 13 2018  1:27AM          < end of copied text >

## 2018-01-13 NOTE — PROGRESS NOTE ADULT - PROBLEM SELECTOR PLAN 1
norovirus GE   Stool cultures negative. Stool O&P negative x 2. Stool giardia negative.  C. diff negative.  CMV IgM negative  +Norovirus  improving well clinically

## 2018-01-13 NOTE — PROGRESS NOTE ADULT - PROBLEM SELECTOR PLAN 2
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  no intervention

## 2018-01-13 NOTE — PROGRESS NOTE ADULT - SUBJECTIVE AND OBJECTIVE BOX
[facility]  INFECTIOUS DISEASES AND INTERNAL MEDICINE at [place]  =======================================================  Raymon Ortiz MD  Diplomates American Board of Internal Medicine and Infectious Diseases  =======================================================    CLOUD, SARA [id number]     Follow up: Diarrhea  NEWLY DIAGNOSED HIV    Febrile  No complaints    diarrhea LESS    Allergies:  No Known Allergies      Antibiotics:  None      REVIEW OF SYSTEMS:  CONSTITUTIONAL:  + Fever + chills  HEENT:  No diplopia or blurred vision.  No earache, sore throat or runny nose.  CARDIOVASCULAR:  No pressure, squeezing, strangling, tightness, heaviness or aching about the chest, neck, axilla or epigastrium.  RESPIRATORY:  No cough, shortness of breath  GASTROINTESTINAL:  + diarrhea.  GENITOURINARY:  No dysuria, frequency or urgency. No Blood in urine  MUSCULOSKELETAL:  no joint aches, no muscle pain  SKIN:  No change in skin, hair or nails.  NEUROLOGIC:  No paresthesias, fasciculations  PSYCHIATRIC:  No disorder of thought or mood.  ENDOCRINE:  No heat or cold intolerance  HEMATOLOGICAL:  No easy bruising or bleeding.      Physical Exam:   Vital Signs Last 24 Hrs  T(C): 36.6 (13 Jan 2018 08:50), Max: 39.3 (12 Jan 2018 20:54)  T(F): 97.9 (13 Jan 2018 08:50), Max: 102.8 (12 Jan 2018 20:54)  HR: 72 (13 Jan 2018 12:28) (64 - 133)  BP: 125/57 (13 Jan 2018 12:28) (113/65 - 134/91)  BP(mean): 74 (13 Jan 2018 06:13) (74 - 86)  RR: 18 (13 Jan 2018 12:28) (13 - 22)  SpO2: 99% (13 Jan 2018 12:28) (98% - 100%)      GEN: NAD, pleasant  HEENT: normocephalic and atraumatic. EOMI. PERRL.    NECK: Supple.   LUNGS: Clear to auscultation.  HEART: Regular rate and rhythm  ABDOMEN: Soft, nontender, and nondistended.  Positive bowel sounds.    : No CVA tenderness  EXTREMITIES: Without any edema.  MSK: No joint swelling  NEUROLOGIC: Cranial nerves II through XII are grossly intact.  PSYCHIATRIC: Appropriate affect .  SKIN: No rash      Labs:                         10.2   4.7   )-----------( 40       ( 13 Jan 2018 08:06 )             29.6   01-13    135  |  103  |  14.0  ----------------------------<  247<H>  3.8   |  18.0<L>  |  0.51    Ca    8.9      13 Jan 2018 08:06  Phos  2.3     01-12  Mg     1.3     01-12    TPro  5.0<L>  /  Alb  2.5<L>  /  TBili  1.3  /  DBili  1.0<H>  /  AST  175<H>  /  ALT  39<H>  /  AlkPhos  192<H>  01-13       RECENT CULTURES:  GI PCR Panel, Stool (01.11.18 @ 08:31)    Specimen Source: .Stool Feces    Culture Results:   Norovirus GI/GII  DETECTED by PCR  *******Please Note:*******  GI panel PCR evaluates for:  Campylobacter, Plesiomonas shigelloides, Salmonella,  Vibrio, Yersinia enterocolitica, Enteroaggregative  Escherichia coli (EAEC), Enteropathogenic E.coli (EPEC),  Enterotoxigenic E. coli (ETEC) lt/st, Shiga-like  toxin-producing E. coli (STEC) stx1/stx2,  Shigella/ Enteroinvasive E. coli (EIEC), Cryptosporidium,  Cyclospora cayetanensis, Entamoeba histolytica,  Giardia lamblia, Adenovirus F 40/41, Astrovirus,  Norovirus GI/GII, Rotavirus A, Sapovirus      01-10 @ 02:43 .Urine Catheterized     No growth      01-10 @ 02:16 .Blood Arterial Catheter     No growth at 48 hours      01-10 @ 01:49 .Blood Blood     No growth at 48 hours      01-09 @ 18:36 .Stool Feces     No Protozoa seen by trichrome stain  No Helminths or Protozoa seen in formalin concentrate  performed by iodine stain  (routine O+P not evaluated for Microsporidia,  Cryptosporidia, Cyclospora, or Isospora.)  Note: One negative specimen does not rule  out the possibility of a parasitic infection.      01-09 @ 11:01 .Stool Feces     No enteric gram negative rods isolated  No enteric pathogens isolated.  (Stool culture examined for Salmonella,  Shigella, Campylobacter, Aeromonas, Plesiomonas,  Vibrio, E.coli O157 and Yersinia)      01-08 @ 19:42 .Blood Blood     No growth at 48 hours      01-08 @ 19:41 .Blood Blood     No growth at 48 hours      01-08 @ 17:48 .Stool Feces     No enteric pathogens isolated.  (Stool culture examined for Salmonella,  Shigella, Campylobacter, Aeromonas, Plesiomonas,  Vibrio, E.coli O157 and Yersinia)      01-02 @ 16:02 .Blood Blood     No growth at 5 days.      01-02 @ 16:01 .Blood Blood     No growth at 5 days.               EXAM:  US ABDOMEN LIMITED                        PROCEDURE DATE:  01/09/2018    INTERPRETATION:  CLINICAL INFORMATION: Hepatic cyst, long-standing right   upper quadrant pain, nausea and diarrhea  COMPARISON: Ultrasound dated 12/25/2017  TECHNIQUE: Sonography of the right upper quadrant.   FINDINGS:  Liver: Increased echogenicity and measures 19.1 cm in length. Calcified   left hepatic lobe cyst measures 9 mm. Calcified right hepatic lobe cyst   measures 1.1 cm.  Bile ducts: Normal caliber. Common bile duct measures 5.5 mm.   Gallbladder: Status post cholecystectomy      Pancreas: Visualized portions are within normal limits.  Right kidney: 10.1 cm. No hydronephrosis.      Ascites: None.  IVC: Visualized portions are within normal limits.  IMPRESSION:   Fatty liver. Calcified hepatic cysts      EXAM:  CT ABDOMEN AND PELVIS OC IC                        PROCEDURE DATE:  01/08/2018    INTERPRETATION:  Contrast enhanced CT of the abdomen and pelvis .  COMPARISON: 12/29/2017 normal CT scan.  CLINICAL HISTORY:    .  Technique: contiguous axial images were obtained with 2.5 mm slice   thickness after intravenous and oral contrast administration.  Coronal and sagittal reformats were also submitted for interpretation.  100 ml of Omnipaque were injected intravenously, and 0 ml were discarded,   without complications noted.   FINDINGS:   A tree-in-bud pattern of opacity seen within the periphery of right lower   lobe likely infectious in etiology.  There is no free intra-abdominal air or ascites.  Multiple lung small subhepatic abscess with a peripheral calcifications   noted in the right and left hepatic lobes likely indicating a   echinococcus cysts. Left hepatic lobe cyst measures 1.1 cm and the right   hepatic lobe cysts measure 1.1 and 0.9 cm of. No other lesions seen. Is mild hepatomegaly.  The, spleen, pancreas, adrenal glands, gallbladder are normal.  There is no intra or extrahepatic biliary ductal dilatation.  The stomach, duodenum, small and large bowel and appendix are within normal limits.  Both kidneys show normal uptake of contrast media without masses or hydronephrosis.    The urinary bladder shows normal morphology and contour.  The reproductive organs are within normal limits.   There are no retroperitoneal masses or abnormal lymphadenopathy.  The retroperitoneal vessels are normal.    There is degenerative spondylosis of thoracal lumbar spine with a   posterior disc ridge complexes@the L3-L4 T12-L1 and T11-T12 disc level   causing mild central canal stenosis .   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

## 2018-01-13 NOTE — PROGRESS NOTE ADULT - SUBJECTIVE AND OBJECTIVE BOX
HEALTH ISSUES - PROBLEM Dx:  admitted with HIV, viral gastroenteritis diarrhea, course complicated by resp distress with hypoxia/ tachypnea/ tachycardia of unknown cause.  ICU evaluated and recommended bactrim + steroids.       INTERVAL HPI/ OVERNIGHT EVENTS:  diarrhea improved with consistency and frequency  appetite poor  oxygenation improved on O2  concerned as to how she got HIV. more history extracted and explained the possibilities      Vital Signs Last 24 Hrs  T(C): 36.6 (13 Jan 2018 08:50), Max: 39.3 (12 Jan 2018 20:54)  T(F): 97.9 (13 Jan 2018 08:50), Max: 102.8 (12 Jan 2018 20:54)  HR: 72 (13 Jan 2018 12:28) (64 - 133)  BP: 125/57 (13 Jan 2018 12:28) (113/65 - 134/91)  BP(mean): 74 (13 Jan 2018 06:13) (74 - 86)  RR: 18 (13 Jan 2018 12:28) (13 - 22)  SpO2: 99% (13 Jan 2018 12:28) (98% - 100%)    PHYSICAL EXAM-  GENERAL: well-groomed, well-developed  HEAD:  Atraumatic, Normocephalic  EYES: EOMI, PERRLA, conjunctiva and sclera clear  ENMT: Moist mucous membranes, No lesions  NECK: Supple, No JVD, Normal thyroid  NERVOUS SYSTEM:  Alert & Oriented X 3, Motor Strength 5/5 B/L upper and lower extremities;   CHEST/LUNG: Bilateral basal rales, R>L with occasional scant rhonchi; No rubs  HEART: Regular rate and rhythm; No murmurs, rubs, or gallops  ABDOMEN: Soft, Nontender, Nondistended; Bowel sounds present  EXTREMITIES:  2+ Peripheral Pulses, No clubbing, cyanosis, or edema  LYMPH: No lymphadenopathy noted  SKIN: No rashes or lesions    LABS:                        10.2   4.7   )-----------( 40       ( 13 Jan 2018 08:06 )             29.6     01-13    135  |  103  |  14.0  ----------------------------<  247<H>  3.8   |  18.0<L>  |  0.51    Ca    8.9      13 Jan 2018 08:06  Phos  2.3     01-12  Mg     1.3     01-12    TPro  5.0<L>  /  Alb  2.5<L>  /  TBili  1.3  /  DBili  1.0<H>  /  AST  175<H>  /  ALT  39<H>  /  AlkPhos  192<H>  01-13    Assessment and Plan

## 2018-01-14 LAB
ALBUMIN SERPL ELPH-MCNC: 2.3 G/DL — LOW (ref 3.3–5.2)
ALP SERPL-CCNC: 213 U/L — HIGH (ref 40–120)
ALT FLD-CCNC: 45 U/L — HIGH
ANION GAP SERPL CALC-SCNC: 14 MMOL/L — SIGNIFICANT CHANGE UP (ref 5–17)
AST SERPL-CCNC: 224 U/L — HIGH
BILIRUB SERPL-MCNC: 1.5 MG/DL — SIGNIFICANT CHANGE UP (ref 0.4–2)
BUN SERPL-MCNC: 23 MG/DL — HIGH (ref 8–20)
CALCIUM SERPL-MCNC: 9.1 MG/DL — SIGNIFICANT CHANGE UP (ref 8.6–10.2)
CHLORIDE SERPL-SCNC: 103 MMOL/L — SIGNIFICANT CHANGE UP (ref 98–107)
CO2 SERPL-SCNC: 18 MMOL/L — LOW (ref 22–29)
CREAT SERPL-MCNC: 0.81 MG/DL — SIGNIFICANT CHANGE UP (ref 0.5–1.3)
GLUCOSE BLDC GLUCOMTR-MCNC: 126 MG/DL — HIGH (ref 70–99)
GLUCOSE BLDC GLUCOMTR-MCNC: 132 MG/DL — HIGH (ref 70–99)
GLUCOSE BLDC GLUCOMTR-MCNC: 170 MG/DL — HIGH (ref 70–99)
GLUCOSE BLDC GLUCOMTR-MCNC: 73 MG/DL — SIGNIFICANT CHANGE UP (ref 70–99)
GLUCOSE SERPL-MCNC: 82 MG/DL — SIGNIFICANT CHANGE UP (ref 70–115)
HCT VFR BLD CALC: 30.8 % — LOW (ref 37–47)
HGB BLD-MCNC: 10.6 G/DL — LOW (ref 12–16)
M TB TUBERC IFN-G BLD QL: -0.01 IU/ML — SIGNIFICANT CHANGE UP
M TB TUBERC IFN-G BLD QL: 0.48 IU/ML — SIGNIFICANT CHANGE UP
M TB TUBERC IFN-G BLD QL: ABNORMAL
MCHC RBC-ENTMCNC: 26.8 PG — LOW (ref 27–31)
MCHC RBC-ENTMCNC: 34.4 G/DL — SIGNIFICANT CHANGE UP (ref 32–36)
MCV RBC AUTO: 78 FL — LOW (ref 81–99)
MITOGEN IGNF BCKGRD COR BLD-ACNC: 0.39 IU/ML — SIGNIFICANT CHANGE UP
PLATELET # BLD AUTO: 40 K/UL — LOW (ref 150–400)
POTASSIUM SERPL-MCNC: 4.2 MMOL/L — SIGNIFICANT CHANGE UP (ref 3.5–5.3)
POTASSIUM SERPL-SCNC: 4.2 MMOL/L — SIGNIFICANT CHANGE UP (ref 3.5–5.3)
PROT SERPL-MCNC: 4.9 G/DL — LOW (ref 6.6–8.7)
RAPID RVP RESULT: SIGNIFICANT CHANGE UP
RBC # BLD: 3.95 M/UL — LOW (ref 4.4–5.2)
RBC # FLD: 15.5 % — SIGNIFICANT CHANGE UP (ref 11–15.6)
SODIUM SERPL-SCNC: 135 MMOL/L — SIGNIFICANT CHANGE UP (ref 135–145)
WBC # BLD: 3.6 K/UL — LOW (ref 4.8–10.8)
WBC # FLD AUTO: 3.6 K/UL — LOW (ref 4.8–10.8)

## 2018-01-14 PROCEDURE — 99233 SBSQ HOSP IP/OBS HIGH 50: CPT

## 2018-01-14 PROCEDURE — 99232 SBSQ HOSP IP/OBS MODERATE 35: CPT

## 2018-01-14 RX ADMIN — Medication 1 LOZENGE: at 11:59

## 2018-01-14 RX ADMIN — Medication 1 LOZENGE: at 19:48

## 2018-01-14 RX ADMIN — Medication 1 LOZENGE: at 16:21

## 2018-01-14 RX ADMIN — Medication 2: at 18:14

## 2018-01-14 RX ADMIN — Medication 800 MILLIGRAM(S): at 16:22

## 2018-01-14 RX ADMIN — NYSTATIN CREAM 1 APPLICATION(S): 100000 CREAM TOPICAL at 18:10

## 2018-01-14 RX ADMIN — Medication 2.5 MILLIGRAM(S): at 23:56

## 2018-01-14 RX ADMIN — Medication 30 MILLIGRAM(S): at 06:44

## 2018-01-14 RX ADMIN — Medication 2.5 MILLIGRAM(S): at 16:27

## 2018-01-14 RX ADMIN — Medication 1 LOZENGE: at 09:04

## 2018-01-14 RX ADMIN — NYSTATIN CREAM 1 APPLICATION(S): 100000 CREAM TOPICAL at 06:44

## 2018-01-14 RX ADMIN — Medication 1 LOZENGE: at 23:49

## 2018-01-14 RX ADMIN — Medication 800 MILLIGRAM(S): at 23:49

## 2018-01-14 RX ADMIN — Medication 650 MILLIGRAM(S): at 18:10

## 2018-01-14 RX ADMIN — Medication 650 MILLIGRAM(S): at 06:44

## 2018-01-14 RX ADMIN — Medication 1 LOZENGE: at 00:42

## 2018-01-14 RX ADMIN — Medication 800 MILLIGRAM(S): at 00:42

## 2018-01-14 RX ADMIN — Medication 600 MILLIGRAM(S): at 08:52

## 2018-01-14 RX ADMIN — Medication 800 MILLIGRAM(S): at 07:29

## 2018-01-14 NOTE — PROGRESS NOTE ADULT - PROBLEM SELECTOR PLAN 2
etiology unclear. There is an element of fatty liver but probably reactive and possibly related to underlyiong AIDS as well. Will follow. etiology unclear. There is an element of fatty liver but probably reactive and possibly related to underlyiong AIDS as well. Will follow. Will also get alpha feto protein, ceruloplasmin and SPEP.

## 2018-01-14 NOTE — PROGRESS NOTE ADULT - SUBJECTIVE AND OBJECTIVE BOX
HEALTH ISSUES - PROBLEM Dx:  admitted with HIV, viral gastroenteritis diarrhea, course complicated by resp distress with hypoxia/ tachypnea/ tachycardia of unknown cause.  ICU evaluated and recommended bactrim + steroids. per ID d/abdifatah and quick taper of steroids. now with fevers and ID evaluating      INTERVAL HPI/ OVERNIGHT EVENTS:  diarrhea improved ; now with fevers and tachycardia    Vital Signs Last 24 Hrs  T(C): 36.6 (14 Jan 2018 09:17), Max: 38.8 (13 Jan 2018 16:27)  T(F): 97.8 (14 Jan 2018 09:17), Max: 101.8 (13 Jan 2018 16:27)  HR: 96 (14 Jan 2018 12:44) (93 - 135)  BP: 127/71 (14 Jan 2018 12:44) (90/55 - 127/71)  BP(mean): 77 (14 Jan 2018 08:54) (67 - 97)  RR: 20 (14 Jan 2018 12:44) (16 - 26)  SpO2: 99% (14 Jan 2018 12:44) (99% - 100%)    PHYSICAL EXAM-  GENERAL: well-groomed, well-developed  HEAD:  Atraumatic, Normocephalic  EYES: EOMI, PERRLA, conjunctiva and sclera clear  ENMT: Moist mucous membranes, No lesions  NECK: Supple, No JVD, Normal thyroid  NERVOUS SYSTEM:  Alert & Oriented X 3, Motor Strength 5/5 B/L upper and lower extremities;   CHEST/LUNG: Bilateral basal rales, R>L with occasional scant rhonchi; No rubs  HEART: Regular rate and rhythm; No murmurs, rubs, or gallops  ABDOMEN: Soft, Nontender, Nondistended; Bowel sounds present  EXTREMITIES:  2+ Peripheral Pulses, No clubbing, cyanosis, or edema  LYMPH: No lymphadenopathy noted  SKIN: No rashes or lesions    LABS:                        10.6   3.6   )-----------( x        ( 14 Jan 2018 07:40 )             30.8     01-14    135  |  103  |  23.0<H>  ----------------------------<  82  4.2   |  18.0<L>  |  0.81    Ca    9.1      14 Jan 2018 07:40  Phos  3.2     01-13  Mg     2.1     01-13    TPro  4.9<L>  /  Alb  2.3<L>  /  TBili  1.5  /  DBili  x   /  AST  224<H>  /  ALT  45<H>  /  AlkPhos  213<H>  01-14    Assessment and Plan

## 2018-01-14 NOTE — PROGRESS NOTE ADULT - SUBJECTIVE AND OBJECTIVE BOX
Pt seen and examined f/u diarrhea and elevated LFTs  No new complaints with ongoing nonbloody diarrhea, possibly slightly better with stool PCR + for norovirus. Tamx yest 101.8. Tolerating diet with no nausea or vomiting,. Denies abdominal pain but abdomen feels sore. Echonoccocus antibody was negative. C diff neg. AMA and anti smooth muscle neg. DOUGLAS + 1:160. Ferritin was normal. LFTs slightly higher.    REVIEW OF SYSTEMS:    CONSTITUTIONAL: No fever, weight loss, or fatigue  EYES: No eye pain, visual disturbances, or discharge  ENMT:  No difficulty hearing, tinnitus, vertigo; No sinus or throat pain  RESPIRATORY: No cough, wheezing, chills or hemoptysis; No shortness of breath  CARDIOVASCULAR: No chest pain, palpitations, dizziness, or leg swelling  GASTROINTESTINAL: as above    MEDICATIONS:  MEDICATIONS  (STANDING):  clotrimazole Lozenge 1 Lozenge Oral five times a day  dextrose 5%. 1000 milliLiter(s) (50 mL/Hr) IV Continuous <Continuous>  dextrose 50% Injectable 12.5 Gram(s) IV Push once  dextrose 50% Injectable 25 Gram(s) IV Push once  dextrose 50% Injectable 25 Gram(s) IV Push once  insulin lispro (HumaLOG) corrective regimen sliding scale   SubCutaneous three times a day before meals  insulin lispro (HumaLOG) corrective regimen sliding scale   SubCutaneous at bedtime  nystatin Cream 1 Application(s) Topical two times a day  sodium bicarbonate 650 milliGRAM(s) Oral two times a day    MEDICATIONS  (PRN):  aluminum hydroxide/magnesium hydroxide/simethicone Suspension 30 milliLiter(s) Oral every 4 hours PRN Dyspepsia  dextrose Gel 1 Dose(s) Oral once PRN Blood Glucose LESS THAN 70 milliGRAM(s)/deciliter  glucagon  Injectable 1 milliGRAM(s) IntraMuscular once PRN Glucose LESS THAN 70 milligrams/deciliter  guaiFENesin  milliGRAM(s) Oral every 12 hours PRN Cough  ibuprofen  Tablet 800 milliGRAM(s) Oral every 6 hours PRN Tmax 100.4  metoprolol    tartrate Injectable 2.5 milliGRAM(s) IV Push every 6 hours PRN HR>110  morphine  - Injectable 2 milliGRAM(s) IV Push every 6 hours PRN Moderate Pain (4 - 6)      Allergies    No Known Allergies    Intolerances        Vital Signs Last 24 Hrs  T(C): 36.6 (14 Jan 2018 09:17), Max: 38.8 (13 Jan 2018 16:27)  T(F): 97.8 (14 Jan 2018 09:17), Max: 101.8 (13 Jan 2018 16:27)  HR: 105 (14 Jan 2018 08:54) (72 - 135)  BP: 108/63 (14 Jan 2018 08:54) (90/55 - 125/57)  BP(mean): 77 (14 Jan 2018 08:54) (67 - 97)  RR: 23 (14 Jan 2018 08:54) (16 - 26)  SpO2: 100% (14 Jan 2018 08:54) (99% - 100%)    01-13 @ 07:01  -  01-14 @ 07:00  --------------------------------------------------------  IN: 0 mL / OUT: 400 mL / NET: -400 mL        PHYSICAL EXAM:    General: Well developed; well nourished; in no acute distress  HEENT: MMM, conjunctiva and sclera clear  Gastrointestinal:Abdomen: Soft with mild diffuse tnederness but no guarding or rebound.  Normal bowel sounds; No hepatosplenomegaly  Extremities: no cyanosis, clubbing or edema.  Skin: Warm and dry. No obvious rash    LABS:  ABG - ( 12 Jan 2018 18:30 )  pH: 7.39  /  pCO2: 28    /  pO2: 107   / HCO3: 19    / Base Excess: -6.9  /  SaO2: 99                  CBC Full  -  ( 14 Jan 2018 07:40 )  WBC Count : 3.6 K/uL  Hemoglobin : 10.6 g/dL  Hematocrit : 30.8 %  Platelet Count - Automated : x  Mean Cell Volume : 78.0 fl  Mean Cell Hemoglobin : 26.8 pg  Mean Cell Hemoglobin Concentration : 34.4 g/dL  Auto Neutrophil # : x  Auto Lymphocyte # : x  Auto Monocyte # : x  Auto Eosinophil # : x  Auto Basophil # : x  Auto Neutrophil % : x  Auto Lymphocyte % : x  Auto Monocyte % : x  Auto Eosinophil % : x  Auto Basophil % : x    01-14    135  |  103  |  23.0<H>  ----------------------------<  82  4.2   |  18.0<L>  |  0.81    Ca    9.1      14 Jan 2018 07:40  Phos  3.2     01-13  Mg     2.1     01-13    TPro  4.9<L>  /  Alb  2.3<L>  /  TBili  1.5  /  DBili  x   /  AST  224<H>  /  ALT  45<H>  /  AlkPhos  213<H>  01-14

## 2018-01-14 NOTE — PROGRESS NOTE ADULT - SUBJECTIVE AND OBJECTIVE BOX
[facility]  INFECTIOUS DISEASES AND INTERNAL MEDICINE at [facility]  =======================================================  Raymon Ortiz MD  Diplomates American Board of Internal Medicine and Infectious Diseases  =======================================================    CLOUD, SARA [id number]     Follow up: Diarrhea  NEWLY DIAGNOSED HIV    Febrile  No complaints    diarrhea LESS    Allergies:  No Known Allergies      Antibiotics:  None      REVIEW OF SYSTEMS:  CONSTITUTIONAL:  + Fever + chills  HEENT:  No diplopia or blurred vision.  No earache, sore throat or runny nose.  CARDIOVASCULAR:  No pressure, squeezing, strangling, tightness, heaviness or aching about the chest, neck, axilla or epigastrium.  RESPIRATORY:  No cough, shortness of breath  GASTROINTESTINAL:  + diarrhea.  GENITOURINARY:  No dysuria, frequency or urgency. No Blood in urine  MUSCULOSKELETAL:  no joint aches, no muscle pain  SKIN:  No change in skin, hair or nails.  NEUROLOGIC:  No paresthesias, fasciculations  PSYCHIATRIC:  No disorder of thought or mood.  ENDOCRINE:  No heat or cold intolerance  HEMATOLOGICAL:  No easy bruising or bleeding.      Physical Exam:    ICU Vital Signs Last 24 Hrs  T(C): 37.6 (14 Jan 2018 14:05), Max: 38.8 (13 Jan 2018 16:27)  T(F): 99.6 (14 Jan 2018 14:05), Max: 101.8 (13 Jan 2018 16:27)  HR: 96 (14 Jan 2018 12:44) (93 - 113)  BP: 127/71 (14 Jan 2018 12:44) (90/55 - 127/71)  BP(mean): 77 (14 Jan 2018 08:54) (67 - 97)  ABP: --  ABP(mean): --  RR: 20 (14 Jan 2018 12:44) (16 - 26)  SpO2: 99% (14 Jan 2018 12:44) (99% - 100%)        GEN: NAD, pleasant  HEENT: normocephalic and atraumatic. EOMI. PERRL.    NECK: Supple.   LUNGS: Clear to auscultation.  HEART: Regular rate and rhythm  ABDOMEN: Soft, nontender, and nondistended.  Positive bowel sounds.    : No CVA tenderness  EXTREMITIES: Without any edema.  MSK: No joint swelling  NEUROLOGIC: Cranial nerves II through XII are grossly intact.  PSYCHIATRIC: Appropriate affect .  SKIN: No rash      Labs:                                         10.6   3.6   )-----------( 40       ( 14 Jan 2018 07:40 )             30.8   01-14    135  |  103  |  23.0<H>  ----------------------------<  82  4.2   |  18.0<L>  |  0.81    Ca    9.1      14 Jan 2018 07:40  Phos  3.2     01-13  Mg     2.1     01-13    TPro  4.9<L>  /  Alb  2.3<L>  /  TBili  1.5  /  DBili  x   /  AST  224<H>  /  ALT  45<H>  /  AlkPhos  213<H>  01-14       RECENT CULTURES:  GI PCR Panel, Stool (01.11.18 @ 08:31)    Specimen Source: .Stool Feces    Culture Results:   Norovirus GI/GII  DETECTED by PCR  *******Please Note:*******  GI panel PCR evaluates for:  Campylobacter, Plesiomonas shigelloides, Salmonella,  Vibrio, Yersinia enterocolitica, Enteroaggregative  Escherichia coli (EAEC), Enteropathogenic E.coli (EPEC),  Enterotoxigenic E. coli (ETEC) lt/st, Shiga-like  toxin-producing E. coli (STEC) stx1/stx2,  Shigella/ Enteroinvasive E. coli (EIEC), Cryptosporidium,  Cyclospora cayetanensis, Entamoeba histolytica,  Giardia lamblia, Adenovirus F 40/41, Astrovirus,  Norovirus GI/GII, Rotavirus A, Sapovirus      01-10 @ 02:43 .Urine Catheterized     No growth      01-10 @ 02:16 .Blood Arterial Catheter     No growth at 48 hours      01-10 @ 01:49 .Blood Blood     No growth at 48 hours      01-09 @ 18:36 .Stool Feces     No Protozoa seen by trichrome stain  No Helminths or Protozoa seen in formalin concentrate  performed by iodine stain  (routine O+P not evaluated for Microsporidia,  Cryptosporidia, Cyclospora, or Isospora.)  Note: One negative specimen does not rule  out the possibility of a parasitic infection.      01-09 @ 11:01 .Stool Feces     No enteric gram negative rods isolated  No enteric pathogens isolated.  (Stool culture examined for Salmonella,  Shigella, Campylobacter, Aeromonas, Plesiomonas,  Vibrio, E.coli O157 and Yersinia)      01-08 @ 19:42 .Blood Blood     No growth at 48 hours      01-08 @ 19:41 .Blood Blood     No growth at 48 hours      01-08 @ 17:48 .Stool Feces     No enteric pathogens isolated.  (Stool culture examined for Salmonella,  Shigella, Campylobacter, Aeromonas, Plesiomonas,  Vibrio, E.coli O157 and Yersinia)      01-02 @ 16:02 .Blood Blood     No growth at 5 days.      01-02 @ 16:01 .Blood Blood     No growth at 5 days.               EXAM:  US ABDOMEN LIMITED                        PROCEDURE DATE:  01/09/2018    INTERPRETATION:  CLINICAL INFORMATION: Hepatic cyst, long-standing right   upper quadrant pain, nausea and diarrhea  COMPARISON: Ultrasound dated 12/25/2017  TECHNIQUE: Sonography of the right upper quadrant.   FINDINGS:  Liver: Increased echogenicity and measures 19.1 cm in length. Calcified   left hepatic lobe cyst measures 9 mm. Calcified right hepatic lobe cyst   measures 1.1 cm.  Bile ducts: Normal caliber. Common bile duct measures 5.5 mm.   Gallbladder: Status post cholecystectomy      Pancreas: Visualized portions are within normal limits.  Right kidney: 10.1 cm. No hydronephrosis.      Ascites: None.  IVC: Visualized portions are within normal limits.  IMPRESSION:   Fatty liver. Calcified hepatic cysts      EXAM:  CT ABDOMEN AND PELVIS OC IC                        PROCEDURE DATE:  01/08/2018    INTERPRETATION:  Contrast enhanced CT of the abdomen and pelvis .  COMPARISON: 12/29/2017 normal CT scan.  CLINICAL HISTORY:    .  Technique: contiguous axial images were obtained with 2.5 mm slice   thickness after intravenous and oral contrast administration.  Coronal and sagittal reformats were also submitted for interpretation.  100 ml of Omnipaque were injected intravenously, and 0 ml were discarded,   without complications noted.   FINDINGS:   A tree-in-bud pattern of opacity seen within the periphery of right lower   lobe likely infectious in etiology.  There is no free intra-abdominal air or ascites.  Multiple lung small subhepatic abscess with a peripheral calcifications   noted in the right and left hepatic lobes likely indicating a   echinococcus cysts. Left hepatic lobe cyst measures 1.1 cm and the right   hepatic lobe cysts measure 1.1 and 0.9 cm of. No other lesions seen. Is mild hepatomegaly.  The, spleen, pancreas, adrenal glands, gallbladder are normal.  There is no intra or extrahepatic biliary ductal dilatation.  The stomach, duodenum, small and large bowel and appendix are within normal limits.  Both kidneys show normal uptake of contrast media without masses or hydronephrosis.    The urinary bladder shows normal morphology and contour.  The reproductive organs are within normal limits.   There are no retroperitoneal masses or abnormal lymphadenopathy.  The retroperitoneal vessels are normal.    There is degenerative spondylosis of thoracal lumbar spine with a   posterior disc ridge complexes@the L3-L4 T12-L1 and T11-T12 disc level   causing mild central canal stenosis .   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

## 2018-01-15 LAB
AFP-TM SERPL-MCNC: 2.6 NG/ML — SIGNIFICANT CHANGE UP
ALBUMIN SERPL ELPH-MCNC: 2 G/DL — LOW (ref 3.3–5.2)
ALP SERPL-CCNC: 206 U/L — HIGH (ref 40–120)
ALT FLD-CCNC: 51 U/L — HIGH
ANION GAP SERPL CALC-SCNC: 17 MMOL/L — SIGNIFICANT CHANGE UP (ref 5–17)
ANISOCYTOSIS BLD QL: SLIGHT — SIGNIFICANT CHANGE UP
AST SERPL-CCNC: 279 U/L — HIGH
BASE EXCESS BLDA CALC-SCNC: -7.7 MMOL/L — LOW (ref -2–2)
BASOPHILS # BLD AUTO: 0 K/UL — SIGNIFICANT CHANGE UP (ref 0–0.2)
BASOPHILS NFR BLD AUTO: 0 % — SIGNIFICANT CHANGE UP (ref 0–2)
BILIRUB DIRECT SERPL-MCNC: 2 MG/DL — HIGH (ref 0–0.3)
BILIRUB INDIRECT FLD-MCNC: 0.1 MG/DL — LOW (ref 0.2–1)
BILIRUB SERPL-MCNC: 2.1 MG/DL — HIGH (ref 0.4–2)
BLOOD GAS COMMENTS ARTERIAL: SIGNIFICANT CHANGE UP
BUN SERPL-MCNC: 33 MG/DL — HIGH (ref 8–20)
BURR CELLS BLD QL SMEAR: PRESENT — SIGNIFICANT CHANGE UP
CALCIUM SERPL-MCNC: 9.1 MG/DL — SIGNIFICANT CHANGE UP (ref 8.6–10.2)
CHLORIDE SERPL-SCNC: 103 MMOL/L — SIGNIFICANT CHANGE UP (ref 98–107)
CO2 SERPL-SCNC: 16 MMOL/L — LOW (ref 22–29)
CREAT SERPL-MCNC: 1.02 MG/DL — SIGNIFICANT CHANGE UP (ref 0.5–1.3)
CULTURE RESULTS: SIGNIFICANT CHANGE UP
DACRYOCYTES BLD QL SMEAR: SLIGHT — SIGNIFICANT CHANGE UP
ELLIPTOCYTES BLD QL SMEAR: SLIGHT — SIGNIFICANT CHANGE UP
EOSINOPHIL # BLD AUTO: 0 K/UL — SIGNIFICANT CHANGE UP (ref 0–0.5)
EOSINOPHIL NFR BLD AUTO: 1 % — SIGNIFICANT CHANGE UP (ref 0–5)
GAS PNL BLDA: SIGNIFICANT CHANGE UP
GAS PNL BLDA: SIGNIFICANT CHANGE UP
GLUCOSE BLDC GLUCOMTR-MCNC: 132 MG/DL — HIGH (ref 70–99)
GLUCOSE BLDC GLUCOMTR-MCNC: 147 MG/DL — HIGH (ref 70–99)
GLUCOSE BLDC GLUCOMTR-MCNC: 91 MG/DL — SIGNIFICANT CHANGE UP (ref 70–99)
GLUCOSE BLDC GLUCOMTR-MCNC: 96 MG/DL — SIGNIFICANT CHANGE UP (ref 70–99)
GLUCOSE SERPL-MCNC: 70 MG/DL — SIGNIFICANT CHANGE UP (ref 70–115)
HCO3 BLDA-SCNC: 18 MMOL/L — LOW (ref 20–26)
HCT VFR BLD CALC: 28.6 % — LOW (ref 37–47)
HCT VFR BLD CALC: 29.1 % — LOW (ref 37–47)
HGB BLD-MCNC: 9.8 G/DL — LOW (ref 12–16)
HGB BLD-MCNC: 9.8 G/DL — LOW (ref 12–16)
HIV-1 VIRAL LOAD RESULT: ABNORMAL
HIV1 RNA # SERPL NAA+PROBE: SIGNIFICANT CHANGE UP
HIV1 RNA SER-IMP: SIGNIFICANT CHANGE UP
HIV1 RNA SERPL NAA+PROBE-ACNC: ABNORMAL
HIV1 RNA SERPL NAA+PROBE-LOG#: 5.56 — SIGNIFICANT CHANGE UP
HOROWITZ INDEX BLDA+IHG-RTO: SIGNIFICANT CHANGE UP
HSV1 AB FLD QL: SIGNIFICANT CHANGE UP TITER
HSV2 AB FLD-ACNC: SIGNIFICANT CHANGE UP TITER
HYPOCHROMIA BLD QL: SLIGHT — SIGNIFICANT CHANGE UP
LACTATE BLDV-MCNC: 3.4 MMOL/L — HIGH (ref 0.5–2)
LYMPHOCYTES # BLD AUTO: 0.3 K/UL — LOW (ref 1–4.8)
LYMPHOCYTES # BLD AUTO: 1 % — LOW (ref 20–55)
MAGNESIUM SERPL-MCNC: 1.7 MG/DL — SIGNIFICANT CHANGE UP (ref 1.6–2.6)
MCHC RBC-ENTMCNC: 26.3 PG — LOW (ref 27–31)
MCHC RBC-ENTMCNC: 26.7 PG — LOW (ref 27–31)
MCHC RBC-ENTMCNC: 33.7 G/DL — SIGNIFICANT CHANGE UP (ref 32–36)
MCHC RBC-ENTMCNC: 34.3 G/DL — SIGNIFICANT CHANGE UP (ref 32–36)
MCV RBC AUTO: 77.9 FL — LOW (ref 81–99)
MCV RBC AUTO: 78.2 FL — LOW (ref 81–99)
MICROCYTES BLD QL: SLIGHT — SIGNIFICANT CHANGE UP
MONOCYTES # BLD AUTO: 0 K/UL — SIGNIFICANT CHANGE UP (ref 0–0.8)
MONOCYTES NFR BLD AUTO: 3 % — SIGNIFICANT CHANGE UP (ref 3–10)
MYELOCYTES NFR BLD: 1 % — HIGH (ref 0–0)
NEUTROPHILS # BLD AUTO: 3.4 K/UL — SIGNIFICANT CHANGE UP (ref 1.8–8)
NEUTROPHILS NFR BLD AUTO: 78 % — HIGH (ref 37–73)
NEUTS BAND # BLD: 16 % — HIGH (ref 0–8)
NRBC # BLD: 2 /100 — HIGH (ref 0–0)
OVALOCYTES BLD QL SMEAR: SLIGHT — SIGNIFICANT CHANGE UP
PCO2 BLDA: 27 MMHG — LOW (ref 35–45)
PH BLDA: 7.39 — SIGNIFICANT CHANGE UP (ref 7.35–7.45)
PHOSPHATE SERPL-MCNC: 4.4 MG/DL — SIGNIFICANT CHANGE UP (ref 2.4–4.7)
PLAT MORPH BLD: NORMAL — SIGNIFICANT CHANGE UP
PLATELET # BLD AUTO: 50 K/UL — LOW (ref 150–400)
PLATELET # BLD AUTO: 75 K/UL — LOW (ref 150–400)
PO2 BLDA: 101 MMHG — SIGNIFICANT CHANGE UP (ref 83–108)
POIKILOCYTOSIS BLD QL AUTO: SLIGHT — SIGNIFICANT CHANGE UP
POTASSIUM SERPL-MCNC: 3.6 MMOL/L — SIGNIFICANT CHANGE UP (ref 3.5–5.3)
POTASSIUM SERPL-SCNC: 3.6 MMOL/L — SIGNIFICANT CHANGE UP (ref 3.5–5.3)
PROCALCITONIN SERPL-MCNC: 5.76 NG/ML — HIGH (ref 0–0.04)
PROT SERPL-MCNC: 4.6 G/DL — LOW (ref 6.6–8.7)
RBC # BLD: 3.67 M/UL — LOW (ref 4.4–5.2)
RBC # BLD: 3.72 M/UL — LOW (ref 4.4–5.2)
RBC # FLD: 15.6 % — SIGNIFICANT CHANGE UP (ref 11–15.6)
RBC # FLD: 15.7 % — HIGH (ref 11–15.6)
RBC BLD AUTO: ABNORMAL
SAO2 % BLDA: 98 % — SIGNIFICANT CHANGE UP (ref 95–99)
SCHISTOCYTES BLD QL AUTO: SLIGHT — SIGNIFICANT CHANGE UP
SODIUM SERPL-SCNC: 136 MMOL/L — SIGNIFICANT CHANGE UP (ref 135–145)
SPECIMEN SOURCE: SIGNIFICANT CHANGE UP
WBC # BLD: 2.6 K/UL — LOW (ref 4.8–10.8)
WBC # BLD: 3.7 K/UL — LOW (ref 4.8–10.8)
WBC # FLD AUTO: 2.6 K/UL — LOW (ref 4.8–10.8)
WBC # FLD AUTO: 3.7 K/UL — LOW (ref 4.8–10.8)

## 2018-01-15 PROCEDURE — 99233 SBSQ HOSP IP/OBS HIGH 50: CPT

## 2018-01-15 PROCEDURE — 76705 ECHO EXAM OF ABDOMEN: CPT | Mod: 26

## 2018-01-15 PROCEDURE — 71045 X-RAY EXAM CHEST 1 VIEW: CPT | Mod: 26

## 2018-01-15 PROCEDURE — 74182 MRI ABDOMEN W/CONTRAST: CPT | Mod: 26

## 2018-01-15 PROCEDURE — 99232 SBSQ HOSP IP/OBS MODERATE 35: CPT

## 2018-01-15 RX ORDER — SODIUM CHLORIDE 9 MG/ML
1000 INJECTION, SOLUTION INTRAVENOUS ONCE
Qty: 0 | Refills: 0 | Status: COMPLETED | OUTPATIENT
Start: 2018-01-15 | End: 2018-01-15

## 2018-01-15 RX ORDER — SODIUM CHLORIDE 9 MG/ML
500 INJECTION, SOLUTION INTRAVENOUS ONCE
Qty: 0 | Refills: 0 | Status: DISCONTINUED | OUTPATIENT
Start: 2018-01-15 | End: 2018-01-15

## 2018-01-15 RX ORDER — SODIUM CHLORIDE 9 MG/ML
1000 INJECTION INTRAMUSCULAR; INTRAVENOUS; SUBCUTANEOUS
Qty: 0 | Refills: 0 | Status: DISCONTINUED | OUTPATIENT
Start: 2018-01-15 | End: 2018-01-15

## 2018-01-15 RX ORDER — METOPROLOL TARTRATE 50 MG
5 TABLET ORAL EVERY 6 HOURS
Qty: 0 | Refills: 0 | Status: DISCONTINUED | OUTPATIENT
Start: 2018-01-15 | End: 2018-01-16

## 2018-01-15 RX ADMIN — Medication 650 MILLIGRAM(S): at 05:11

## 2018-01-15 RX ADMIN — Medication 650 MILLIGRAM(S): at 19:28

## 2018-01-15 RX ADMIN — Medication 2.5 MILLIGRAM(S): at 14:19

## 2018-01-15 RX ADMIN — Medication 20 MILLIGRAM(S): at 05:11

## 2018-01-15 RX ADMIN — Medication 1 LOZENGE: at 19:30

## 2018-01-15 RX ADMIN — Medication 1 LOZENGE: at 08:24

## 2018-01-15 RX ADMIN — SODIUM CHLORIDE 3000 MILLILITER(S): 9 INJECTION, SOLUTION INTRAVENOUS at 16:11

## 2018-01-15 RX ADMIN — NYSTATIN CREAM 1 APPLICATION(S): 100000 CREAM TOPICAL at 05:11

## 2018-01-15 RX ADMIN — Medication 2.5 MILLIGRAM(S): at 21:33

## 2018-01-15 RX ADMIN — MORPHINE SULFATE 2 MILLIGRAM(S): 50 CAPSULE, EXTENDED RELEASE ORAL at 14:06

## 2018-01-15 RX ADMIN — Medication 1 LOZENGE: at 11:23

## 2018-01-15 RX ADMIN — Medication 2.5 MILLIGRAM(S): at 05:30

## 2018-01-15 RX ADMIN — Medication 800 MILLIGRAM(S): at 15:18

## 2018-01-15 RX ADMIN — SODIUM CHLORIDE 3000 MILLILITER(S): 9 INJECTION, SOLUTION INTRAVENOUS at 20:00

## 2018-01-15 RX ADMIN — MORPHINE SULFATE 2 MILLIGRAM(S): 50 CAPSULE, EXTENDED RELEASE ORAL at 14:37

## 2018-01-15 RX ADMIN — NYSTATIN CREAM 1 APPLICATION(S): 100000 CREAM TOPICAL at 19:31

## 2018-01-15 RX ADMIN — Medication 800 MILLIGRAM(S): at 01:36

## 2018-01-15 RX ADMIN — Medication 1 LOZENGE: at 20:35

## 2018-01-15 RX ADMIN — SODIUM CHLORIDE 50 MILLILITER(S): 9 INJECTION INTRAMUSCULAR; INTRAVENOUS; SUBCUTANEOUS at 09:00

## 2018-01-15 NOTE — CHART NOTE - NSCHARTNOTEFT_GEN_A_CORE
pt has had fever for > week, with tachycardia which ws episodic now persistent along with tachypnea since yesterday. d/w ID over weekend options of empiric antibiotics vs LP. per ID pt has already been treated with antibiotics since admission. to monitor off antibiotics and rpt c/s with temp spike and if any yield to start antibiotics. Have d/w GI about increasing trend of LFTs since yesterday, until then though elevated they were plateued.  generally deemed etiology of all findings so far has norovirus infection.     today pt lethargic and with visible RUQ tenderness, tachycardic. Procalcitonin and lactate elevated and this can be explained with all the other findings and it is not necessarily sepsis.      PLAN-     IVF  Iv lopressor  MRI liver- results shows non acute.  [facility] liver- non acute    I have discussed with pt's daughter in detail. and further plan if MRI non confirmatory of etiology, will consider transfer to [facility] under GI service for possible HIV hepatopathy. CD4 and viral load pending here. pt has had fever for > week, with tachycardia which ws episodic now persistent along with tachypnea since yesterday. d/w ID over weekend options of empiric antibiotics vs LP. per ID pt has already been treated with antibiotics since admission. to monitor off antibiotics and rpt c/s with temp spike and if any yield to start antibiotics. Have d/w GI about increasing trend of LFTs since yesterday, until then though elevated they were plateued.  generally deemed etiology of all findings so far has norovirus infection.     today pt lethargic and with visible RUQ tenderness, tachycardic. Procalcitonin and lactate elevated and this can be explained with all the other findings and it is not necessarily sepsis. to consider disseminated MAC, AFB is negative. Unless the blood c/s shows MAC.    PLAN-     IVF  Iv lopressor  MRI liver- results shows non acute.  [facility] liver- non acute    I have discussed with pt's daughter in detail. and further plan if MRI non confirmatory of etiology, will consider transfer to [facility] under GI service for possible HIV hepatopathy. CD4 and viral load pending here.

## 2018-01-15 NOTE — PROGRESS NOTE ADULT - SUBJECTIVE AND OBJECTIVE BOX
HEALTH ISSUES - PROBLEM Dx:  admitted with HIV, viral gastroenteritis diarrhea, course complicated by resp distress with hypoxia/ tachypnea/ tachycardia of unknown cause.  ICU evaluated and recommended bactrim + steroids. per ID d/abdifatah and quick taper of steroids. now with fevers and ID evaluating    INTERVAL HPI/ OVERNIGHT EVENTS:  diarrhea improved ; now with fevers and tachycardia/ tachypnea;   LFTs trended up    Vital Signs Last 24 Hrs  T(C): 39.3 (15 Julian 2018 15:13), Max: 39.3 (15 Julian 2018 15:13)  T(F): 102.8 (15 Julian 2018 15:13), Max: 102.8 (15 Julian 2018 15:13)  HR: 159 (15 Julian 2018 15:13) (96 - 159)  BP: 127/95 (15 Julian 2018 15:13) (99/57 - 143/69)  BP(mean): 73 (15 Julian 2018 04:00) (73 - 102)  RR: 28 (15 Julian 2018 15:13) (17 - 29)  SpO2: 100% (15 Julian 2018 15:13) (95% - 100%)    PHYSICAL EXAM-  GENERAL: lethargic with fevers  HEAD:  Atraumatic, Normocephalic  EYES: EOMI, PERRLA, conjunctiva and sclera clear  ENMT: Moist mucous membranes, No lesions  NECK: Supple, No JVD, Normal thyroid  NERVOUS SYSTEM:  Alert & Oriented X 3, Motor Strength 5/5 B/L upper and lower extremities;   CHEST/LUNG: Bilateral basal rales, No rubs  HEART: Regular rate and rhythm; No murmurs, rubs, or gallops  ABDOMEN: Soft, Nontender, Nondistended; Bowel sounds present  EXTREMITIES:  2+ Peripheral Pulses, No clubbing, cyanosis, or edema  LYMPH: No lymphadenopathy noted  SKIN: No rashes or lesions      LABS:                        9.8    2.6   )-----------( 50       ( 15 Julian 2018 06:22 )             29.1     01-15    136  |  103  |  33.0<H>  ----------------------------<  70  3.6   |  16.0<L>  |  1.02    Ca    9.1      15 Julian 2018 06:22  Phos  4.4     01-15  Mg     1.7     01-15    TPro  4.6<L>  /  Alb  2.0<L>  /  TBili  2.1<H>  /  DBili  2.0<H>  /  AST  279<H>  /  ALT  51<H>  /  AlkPhos  206<H>  01-15    Assessment and Plan

## 2018-01-15 NOTE — PROGRESS NOTE ADULT - PROBLEM SELECTOR PLAN 2
CXR with no pneumonia  CT Chest with no pneumonia  no PE  UA negative for UTI  Blood cultures no growth  Hold off on antibiotics since patient has been on Ertapenem last admission

## 2018-01-15 NOTE — CONSULT NOTE ADULT - SUBJECTIVE AND OBJECTIVE BOX
Patient is a 67y old  Female who presents with a chief complaint of abdominal pain fever and diarrhea (09 Jan 2018 05:07)         68 yo F with h/o DM, GERD, HTN, HLD here with possible echinococcal infection. Found to be HIV positive. Patient has been having fevers, tachycardia and has become more lethargic over the last 24 hours  Patient has been having increased RUQ abdominal pain. LFT's elevated.     PAST MEDICAL & SURGICAL HISTORY:  Anemia  Sleep apnea  Myocardial infarction  GERD (gastroesophageal reflux disease)  Dyslipidemia  Hypertension  Diabetes mellitus  H/O right knee surgery: 8/20  S/P cataract extraction: B/L  S/P partial thyroidectomy  S/P tubal ligation: s/p cyst removal of left axilla  S/P cholecystectomy    Allergies    No Known Allergies    Intolerances      FAMILY HISTORY:  No pertinent family history in first degree relatives      Medications:  clotrimazole Lozenge 1 Lozenge Oral five times a day    metoprolol    tartrate Injectable 2.5 milliGRAM(s) IV Push every 6 hours PRN    guaiFENesin  milliGRAM(s) Oral every 12 hours PRN    ibuprofen  Tablet 800 milliGRAM(s) Oral every 6 hours PRN  morphine  - Injectable 2 milliGRAM(s) IV Push every 6 hours PRN        aluminum hydroxide/magnesium hydroxide/simethicone Suspension 30 milliLiter(s) Oral every 4 hours PRN      dextrose 50% Injectable 12.5 Gram(s) IV Push once  dextrose 50% Injectable 25 Gram(s) IV Push once  dextrose 50% Injectable 25 Gram(s) IV Push once  dextrose Gel 1 Dose(s) Oral once PRN  glucagon  Injectable 1 milliGRAM(s) IntraMuscular once PRN  insulin lispro (HumaLOG) corrective regimen sliding scale   SubCutaneous three times a day before meals  insulin lispro (HumaLOG) corrective regimen sliding scale   SubCutaneous at bedtime    dextrose 5%. 1000 milliLiter(s) IV Continuous <Continuous>  lactated ringers Bolus 1000 milliLiter(s) IV Bolus once  sodium bicarbonate 650 milliGRAM(s) Oral two times a day  sodium chloride 0.9%. 1000 milliLiter(s) IV Continuous <Continuous>      nystatin Cream 1 Application(s) Topical two times a day      CONSTITUTIONAL: fever and chills.  Mouth/Throat: Thrush noted on tongue  CARDIOVASCULAR: no chest pain and no edema.  RESPIRATORY: tachypnea  Gastrointestinal : RUQ abdominal pain  GENITOURINARY: no dysuria, no frequency, and no hematuria.  MUSCULOSKELETAL: no back pain, no musculoskeletal pain, no neck pain, and no weakness.  SKIN: no abrasions, no jaundice, no lesions, no pruritis, and no rashes.  NEURO: Responsive, alert      Vital Signs Last 24 Hrs  T(C): 39.3 (15 Ujlian 2018 15:13), Max: 39.3 (15 Julian 2018 15:13)  T(F): 102.8 (15 Julian 2018 15:13), Max: 102.8 (15 Julian 2018 15:13)  HR: 159 (15 Julian 2018 15:13) (96 - 159)  BP: 127/95 (15 Julian 2018 15:13) (99/57 - 143/69)  BP(mean): 73 (15 Julian 2018 04:00) (73 - 102)  RR: 28 (15 Julian 2018 15:13) (17 - 28)  SpO2: 100% (15 Julian 2018 15:13) (95% - 100%)    ABG - ( 15 Julian 2018 16:32 )  pH: 7.39  /  pCO2: 27    /  pO2: 101   / HCO3: 18    / Base Excess: -7.7  /  SaO2: 98        I&O's Detail    14 Jan 2018 07:01  -  15 Julian 2018 07:00  --------------------------------------------------------  IN:    Oral Fluid: 360 mL  Total IN: 360 mL    OUT:  Total OUT: 0 mL    Total NET: 360 mL      15 Julian 2018 07:01  -  15 Julian 2018 17:12  --------------------------------------------------------  IN:    Lactated Ringers IV Bolus: 1000 mL    Oral Fluid: 360 mL    sodium chloride 0.9%.: 600 mL  Total IN: 1960 mL    OUT:    Voided: 575 mL  Total OUT: 575 mL    Total NET: 1385 mL    PHYSICAL EXAM-  GENERAL: lethargic with fevers  HEAD:  Atraumatic, Normocephalic  EYES: EOMI, PERRLA, conjunctiva and sclera clear  ENMT: Moist mucous membranes, No lesions  NECK: Supple, No JVD, Normal thyroid  NERVOUS SYSTEM:  Alert & Oriented X 3, Motor Strength 5/5 B/L upper and lower extremities;   CHEST/LUNG: Bilateral basal rales, No rubs  HEART: Regular rate and rhythm; No murmurs, rubs, or gallops  ABDOMEN: Soft, Nontender, Nondistended; Bowel sounds present  EXTREMITIES:  2+ Peripheral Pulses, No clubbing, cyanosis, or edema  LYMPH: No lymphadenopathy noted  SKIN: No rashes or lesions        LABS:                        9.8    2.6   )-----------( 50       ( 15 Julian 2018 06:22 )             29.1     01-15    136  |  103  |  33.0<H>  ----------------------------<  70  3.6   |  16.0<L>  |  1.02    Ca    9.1      15 Julian 2018 06:22  Phos  4.4     01-15  Mg     1.7     01-15    TPro  4.6<L>  /  Alb  2.0<L>  /  TBili  2.1<H>  /  DBili  2.0<H>  /  AST  279<H>  /  ALT  51<H>  /  AlkPhos  206<H>  01-15          CAPILLARY BLOOD GLUCOSE      POCT Blood Glucose.: 147 mg/dL (15 Julian 2018 11:21)        CULTURES:  Culture Results:   Testing in progress (01-14 @ 16:21)  Culture Results:   No Cryptosporidium, Cyclospora sp. or Isospora found by modified acid  fast stain (01-13 @ 07:30)  Culture Results:   No Microsporidia species found  by modified trichrome stain (01-13 @ 07:30)  Culture Results:   Testing in progress (01-12 @ 22:51)  Culture Results:   Testing in progress (01-12 @ 19:48)  Culture Results:   Norovirus GI/GII  DETECTED by PCR  *******Please Note:*******  GI panel PCR evaluates for:  Campylobacter, Plesiomonas shigelloides, Salmonella,  Vibrio, Yersinia enterocolitica, Enteroaggregative  Escherichia coli (EAEC), Enteropathogenic E.coli (EPEC),  Enterotoxigenic E. coli (ETEC) lt/st, Shiga-like  toxin-producing E. coli (STEC) stx1/stx2,  Shigella/ Enteroinvasive E. coli (EIEC), Cryptosporidium,  Cyclospora cayetanensis, Entamoeba histolytica,  Giardia lamblia, Adenovirus F 40/41, Astrovirus,  Norovirus GI/GII, Rotavirus A, Sapovirus (01-11 @ 08:31)  Culture Results:   No growth (01-10 @ 02:43)  Culture Results:   No growth at 5 days. (01-10 @ 02:16)  Culture Results:   No growth at 5 days. (01-10 @ 01:49)  Culture Results:   No Protozoa seen by trichrome stain  No Helminths or Protozoa seen in formalin concentrate  performed by iodine stain  (routine O+P not evaluated for Microsporidia,  Cryptosporidia, Cyclospora, or Isospora.)  Note: One negative specimen does not rule  out the possibility of a parasitic infection. (01-09 @ 18:36)  Culture Results:   Giardia antigen negative performed by rapid immunoassay (non-enzymatic).  (It is recommended that all specimens tested by  an immunochromatographic card test be  confirmed by another method.) (01-09 @ 18:36)  Culture Results:   No Protozoa seen by trichrome stain  No Helminths or Protozoa seen in formalin concentrate  performed by iodine stain  (routine O+P not evaluated for Microsporidia,  Cryptosporidia, Cyclospora, or Isospora.)  Note: One negative specimen does not rule  out the possibility of a parasitic infection. (01-09 @ 18:36)  Culture Results:   No enteric gram negative rods isolated  No enteric pathogens isolated.  (Stool culture examined for Salmonella,  Shigella, Campylobacter, Aeromonas, Plesiomonas,  Vibrio, E.coli O157 and Yersinia) (01-09 @ 11:01)  C Diff by PCR Result: NotDetec (01-09 @ 11:00)  Culture Results:   No growth at 5 days. (01-08 @ 19:42)  Culture Results:   No growth at 5 days. (01-08 @ 19:41)  Culture Results:   No enteric pathogens isolated.  (Stool culture examined for Salmonella,  Shigella, Campylobacter, Aeromonas, Plesiomonas,  Vibrio, E.coli O157 and Yersinia) (01-08 @ 17:48)      RADIOLOGY:    < from: Xray Chest 1 View AP-PORTABLE IMMEDIATE (01.15.18 @ 15:41) >   EXAM:  XR CHEST PORTABLE IMMED 1V                          PROCEDURE DATE:  01/15/2018          INTERPRETATION:  Clinical information: Shortness of breath    Portable AP radiograph of the chest was performed.    Comparison: 1/12/2018    The lungs are clear and the heart is normal. The osseous structures are   unremarkable. There are surgical clips in the left side of the neck.      Impression:    No acute pulmonary disease.    LAVON BOWMAN M.D., ATTENDING RADIOLOGIST  This document has been electronically signed. Julian 15 2018  4:42PM    < end of copied text >    < from: CT Angio Chest w/ IV Cont (01.13.18 @ 00:39) >  EXAM:  CT ANGIO CHEST (W)AW IC                          PROCEDURE DATE:  01/13/2018          INTERPRETATION:  CLINICAL HISTORY: Fever and diarrhea. Rule out pulmonary   embolism.     COMPARISON: CTA chest 8/28/2017    TECHNIQUE: Thoracic CT scan with pulmonary embolism protocol was   performed with the administration of intravenous contrast.    Total of 58 cc of Omnipaque 350 was injected intravenously. Coronal,   sagittal, and sagittal oblique reformations were obtained. Axial MIP   images were obtained.    FINDINGS:     There is no evidence of pulmonary embolism.     The thoracic aorta and main pulmonary artery are normal in caliber. The   heart is normal size.      Peripherally oriented subpleural airspace opacities in the left lung are  nonspecific and may reflect areas of infectious or inflammatory process,   not limited to entity such as septic emboli.    Minimal bibasilar dependent changes are seen posteriorly.     Small right pleural effusion noted. No pericardial effusion. There is no   pneumothorax or pneumomediastinum.    There is no axillary, mediastinal, or hilar lymphadenopathy.     Limited images of the upper abdomen shows nonspecific indeterminate   hepatic dome rim calcification, unchanged.    Advanced multilevel thoracic spondylosis. T6 osseous measurement noted.    IMPRESSION:     No evidence of pulmonary embolism.    Peripherally oriented subpleural left-sided airspace opacities may   reflect foci of infection. Small right pleural effusion. Follow-up to   resolution.    MARANDA SOLANO M.D., ATTENDING RADIOLOGIST  This document has been electronically signed. Jan 13 2018  1:27AM    < end of copied text > Patient is a 67y old  Female who presents with a chief complaint of abdominal pain fever and diarrhea (09 Jan 2018 05:07)         66 yo F with h/o DM, GERD, HTN, HLD here with possible echinococcal infection. Found to be HIV positive. Patient has been having fevers, tachycardia and has become more lethargic over the last 24 hours  Patient has been having increased RUQ abdominal pain. LFT's elevated.     PAST MEDICAL & SURGICAL HISTORY:  Anemia  Sleep apnea  Myocardial infarction  GERD (gastroesophageal reflux disease)  Dyslipidemia  Hypertension  Diabetes mellitus  H/O right knee surgery: 8/20  S/P cataract extraction: B/L  S/P partial thyroidectomy  S/P tubal ligation: s/p cyst removal of left axilla  S/P cholecystectomy    Allergies    No Known Allergies    Intolerances      FAMILY HISTORY:  No pertinent family history in first degree relatives      Medications:  clotrimazole Lozenge 1 Lozenge Oral five times a day    metoprolol    tartrate Injectable 2.5 milliGRAM(s) IV Push every 6 hours PRN    guaiFENesin  milliGRAM(s) Oral every 12 hours PRN    ibuprofen  Tablet 800 milliGRAM(s) Oral every 6 hours PRN  morphine  - Injectable 2 milliGRAM(s) IV Push every 6 hours PRN        aluminum hydroxide/magnesium hydroxide/simethicone Suspension 30 milliLiter(s) Oral every 4 hours PRN      dextrose 50% Injectable 12.5 Gram(s) IV Push once  dextrose 50% Injectable 25 Gram(s) IV Push once  dextrose 50% Injectable 25 Gram(s) IV Push once  dextrose Gel 1 Dose(s) Oral once PRN  glucagon  Injectable 1 milliGRAM(s) IntraMuscular once PRN  insulin lispro (HumaLOG) corrective regimen sliding scale   SubCutaneous three times a day before meals  insulin lispro (HumaLOG) corrective regimen sliding scale   SubCutaneous at bedtime    dextrose 5%. 1000 milliLiter(s) IV Continuous <Continuous>  lactated ringers Bolus 1000 milliLiter(s) IV Bolus once  sodium bicarbonate 650 milliGRAM(s) Oral two times a day  sodium chloride 0.9%. 1000 milliLiter(s) IV Continuous <Continuous>      nystatin Cream 1 Application(s) Topical two times a day      CONSTITUTIONAL: fever and chills.  Mouth/Throat: Thrush noted on tongue  CARDIOVASCULAR: no chest pain and no edema.  RESPIRATORY: tachypnea  Gastrointestinal : RUQ abdominal pain  GENITOURINARY: no dysuria, no frequency, and no hematuria.  MUSCULOSKELETAL: no back pain, no musculoskeletal pain, no neck pain, and no weakness.  SKIN: no abrasions, no jaundice, no lesions, no pruritis, and no rashes.  NEURO: Responsive, alert      Vital Signs Last 24 Hrs  T(C): 39.3 (15 Julian 2018 15:13), Max: 39.3 (15 Julian 2018 15:13)  T(F): 102.8 (15 Julian 2018 15:13), Max: 102.8 (15 Julian 2018 15:13)  HR: 159 (15 Julian 2018 15:13) (96 - 159)  BP: 127/95 (15 Julian 2018 15:13) (99/57 - 143/69)  BP(mean): 73 (15 Julian 2018 04:00) (73 - 102)  RR: 28 (15 Julian 2018 15:13) (17 - 28)  SpO2: 100% (15 Julian 2018 15:13) (95% - 100%)    ABG - ( 15 Julian 2018 16:32 )  pH: 7.39  /  pCO2: 27    /  pO2: 101   / HCO3: 18    / Base Excess: -7.7  /  SaO2: 98        I&O's Detail    14 Jan 2018 07:01  -  15 Julian 2018 07:00  --------------------------------------------------------  IN:    Oral Fluid: 360 mL  Total IN: 360 mL    OUT:  Total OUT: 0 mL    Total NET: 360 mL      15 Julian 2018 07:01  -  15 Julian 2018 17:12  --------------------------------------------------------  IN:    Lactated Ringers IV Bolus: 1000 mL    Oral Fluid: 360 mL    sodium chloride 0.9%.: 600 mL  Total IN: 1960 mL    OUT:    Voided: 575 mL  Total OUT: 575 mL    Total NET: 1385 mL    PHYSICAL EXAM-  GENERAL: lethargic with fevers  HEAD:  Atraumatic, Normocephalic  EYES: EOMI, PERRLA, conjunctiva and sclera clear  ENMT: Moist mucous membranes, No lesions  NECK: Supple, No JVD, Normal thyroid  NERVOUS SYSTEM:  Alert & Oriented X 3, Motor Strength 5/5 B/L upper and lower extremities;   CHEST/LUNG: Bilateral basal rales, No rubs  HEART: Regular rate and rhythm; No murmurs, rubs, or gallops  ABDOMEN: Soft, Nontender, Nondistended; Bowel sounds present  EXTREMITIES:  2+ Peripheral Pulses, No clubbing, cyanosis, or edema  LYMPH: No lymphadenopathy noted  SKIN: No rashes or lesions        LABS:                        9.8    2.6   )-----------( 50       ( 15 Julian 2018 06:22 )             29.1     01-15    136  |  103  |  33.0<H>  ----------------------------<  70  3.6   |  16.0<L>  |  1.02    Ca    9.1      15 Julian 2018 06:22  Phos  4.4     01-15  Mg     1.7     01-15    TPro  4.6<L>  /  Alb  2.0<L>  /  TBili  2.1<H>  /  DBili  2.0<H>  /  AST  279<H>  /  ALT  51<H>  /  AlkPhos  206<H>  01-15          CAPILLARY BLOOD GLUCOSE      POCT Blood Glucose.: 147 mg/dL (15 Julian 2018 11:21)        CULTURES:  Culture Results:   Testing in progress (01-14 @ 16:21)  Culture Results:   No Cryptosporidium, Cyclospora sp. or Isospora found by modified acid  fast stain (01-13 @ 07:30)  Culture Results:   No Microsporidia species found  by modified trichrome stain (01-13 @ 07:30)  Culture Results:   Testing in progress (01-12 @ 22:51)  Culture Results:   Testing in progress (01-12 @ 19:48)  Culture Results:   Norovirus GI/GII  DETECTED by PCR  *******Please Note:*******  GI panel PCR evaluates for:  Campylobacter, Plesiomonas shigelloides, Salmonella,  Vibrio, Yersinia enterocolitica, Enteroaggregative  Escherichia coli (EAEC), Enteropathogenic E.coli (EPEC),  Enterotoxigenic E. coli (ETEC) lt/st, Shiga-like  toxin-producing E. coli (STEC) stx1/stx2,  Shigella/ Enteroinvasive E. coli (EIEC), Cryptosporidium,  Cyclospora cayetanensis, Entamoeba histolytica,  Giardia lamblia, Adenovirus F 40/41, Astrovirus,  Norovirus GI/GII, Rotavirus A, Sapovirus (01-11 @ 08:31)  Culture Results:   No growth (01-10 @ 02:43)  Culture Results:   No growth at 5 days. (01-10 @ 02:16)  Culture Results:   No growth at 5 days. (01-10 @ 01:49)  Culture Results:   No Protozoa seen by trichrome stain  No Helminths or Protozoa seen in formalin concentrate  performed by iodine stain  (routine O+P not evaluated for Microsporidia,  Cryptosporidia, Cyclospora, or Isospora.)  Note: One negative specimen does not rule  out the possibility of a parasitic infection. (01-09 @ 18:36)  Culture Results:   Giardia antigen negative performed by rapid immunoassay (non-enzymatic).  (It is recommended that all specimens tested by  an immunochromatographic card test be  confirmed by another method.) (01-09 @ 18:36)  Culture Results:   No Protozoa seen by trichrome stain  No Helminths or Protozoa seen in formalin concentrate  performed by iodine stain  (routine O+P not evaluated for Microsporidia,  Cryptosporidia, Cyclospora, or Isospora.)  Note: One negative specimen does not rule  out the possibility of a parasitic infection. (01-09 @ 18:36)  Culture Results:   No enteric gram negative rods isolated  No enteric pathogens isolated.  (Stool culture examined for Salmonella,  Shigella, Campylobacter, Aeromonas, Plesiomonas,  Vibrio, E.coli O157 and Yersinia) (01-09 @ 11:01)  C Diff by PCR Result: NotDetec (01-09 @ 11:00)  Culture Results:   No growth at 5 days. (01-08 @ 19:42)  Culture Results:   No growth at 5 days. (01-08 @ 19:41)  Culture Results:   No enteric pathogens isolated.  (Stool culture examined for Salmonella,  Shigella, Campylobacter, Aeromonas, Plesiomonas,  Vibrio, E.coli O157 and Yersinia) (01-08 @ 17:48)      RADIOLOGY:    < from: Xray Chest 1 View AP-PORTABLE IMMEDIATE (01.15.18 @ 15:41) >   EXAM:  XR CHEST PORTABLE IMMED 1V                          PROCEDURE DATE:  01/15/2018          INTERPRETATION:  Clinical information: Shortness of breath    Portable AP radiograph of the chest was performed.    Comparison: 1/12/2018    The lungs are clear and the heart is normal. The osseous structures are   unremarkable. There are surgical clips in the left side of the neck.      Impression:    No acute pulmonary disease.    LAVON BOWMAN M.D., ATTENDING RADIOLOGIST  This document has been electronically signed. Julian 15 2018  4:42PM    < end of copied text >    < from: CT Angio Chest w/ IV Cont (01.13.18 @ 00:39) >  EXAM:  CT ANGIO CHEST (W)AW IC                          PROCEDURE DATE:  01/13/2018          INTERPRETATION:  CLINICAL HISTORY: Fever and diarrhea. Rule out pulmonary   embolism.     COMPARISON: CTA chest 8/28/2017    TECHNIQUE: Thoracic CT scan with pulmonary embolism protocol was   performed with the administration of intravenous contrast.    Total of 58 cc of Omnipaque 350 was injected intravenously. Coronal,   sagittal, and sagittal oblique reformations were obtained. Axial MIP   images were obtained.    FINDINGS:     There is no evidence of pulmonary embolism.     The thoracic aorta and main pulmonary artery are normal in caliber. The   heart is normal size.      Peripherally oriented subpleural airspace opacities in the left lung are  nonspecific and may reflect areas of infectious or inflammatory process,   not limited to entity such as septic emboli.    Minimal bibasilar dependent changes are seen posteriorly.     Small right pleural effusion noted. No pericardial effusion. There is no   pneumothorax or pneumomediastinum.    There is no axillary, mediastinal, or hilar lymphadenopathy.     Limited images of the upper abdomen shows nonspecific indeterminate   hepatic dome rim calcification, unchanged.    Advanced multilevel thoracic spondylosis. T6 osseous measurement noted.    IMPRESSION:     No evidence of pulmonary embolism.    Peripherally oriented subpleural left-sided airspace opacities may   reflect foci of infection. Small right pleural effusion. Follow-up to   resolution.    MARANDA SOLANO M.D., ATTENDING RADIOLOGIST  This document has been electronically signed. Jan 13 2018  1:27AM    < end of copied text >      < from: CT Abdomen and Pelvis w/ Oral Cont and w/ IV Cont (01.08.18 @ 20:37) >   EXAM:  CT ABDOMEN AND PELVIS OC IC                          PROCEDURE DATE:  01/08/2018          INTERPRETATION:  Contrast enhanced CT of the abdomen and pelvis .  COMPARISON: 12/29/2017 normal CT scan.    CLINICAL HISTORY:    .    Technique: contiguous axial images were obtained with 2.5 mm slice   thickness after intravenous and oral contrast administration.  Coronal and sagittal reformats were also submitted for interpretation.    100 ml of Omnipaque were injected intravenously, and 0 ml were discarded,   without complications noted.     FINDINGS:     A tree-in-bud pattern of opacity seen within the periphery of right lower   lobe likely infectious in etiology.  There is no free intra-abdominal air or ascites.  Multiple lung small subhepatic abscess with a peripheral calcifications   noted in the right and left hepatic lobes likely indicating a   echinococcus cysts. Left hepatic lobe cyst measures 1.1 cm and the right   hepatic lobe cysts measure 1.1 and 0.9 cm of. No other lesions seen. Is   mild hepatomegaly.  The, spleen, pancreas, adrenal glands, gallbladder are normal.    There is no intra or extrahepatic biliary ductal dilatation.    The stomach, duodenum, small and large bowel and appendix are within   normal limits.    Both kidneys show normal uptake of contrast media without masses or   hydronephrosis.      The urinary bladder shows normal morphology and contour.    The reproductive organs are within normal limits.       There are no retroperitoneal masses or abnormal lymphadenopathy.  The retroperitoneal vessels are normal.      There is degenerative spondylosis of thoracal lumbar spine with a   posterior disc ridge complexes@the L3-L4 T12-L1 and T11-T12 disc level   causing mild central canal stenosis .   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    TREASURE SALAS M.D., ATTENDING RADIOLOGIST  This document has been electronically signed. Jan 8 2018  9:19PM          < end of copied text > Patient is a 67y old  Female who presents with a chief complaint of abdominal pain fever and diarrhea (09 Jan 2018 05:07)         68 yo F with h/o DM, GERD, HTN, HLD was admitted  1/8/18  and has recently had multiple admissions to the hospital,  here with possible echinococcal infection. Found to be HIV positive. Patient has been having fevers, tachycardia and has become more lethargic over the last 24 hours. Patient has been having increased RUQ abdominal pain. LFT's elevated.     PAST MEDICAL & SURGICAL HISTORY:  Anemia  Sleep apnea  Myocardial infarction  GERD (gastroesophageal reflux disease)  Dyslipidemia  Hypertension  Diabetes mellitus  H/O right knee surgery: 8/20  S/P cataract extraction: B/L  S/P partial thyroidectomy  S/P tubal ligation: s/p cyst removal of left axilla  S/P cholecystectomy    Allergies    No Known Allergies    Intolerances      FAMILY HISTORY:  No pertinent family history in first degree relatives      Medications:  clotrimazole Lozenge 1 Lozenge Oral five times a day    metoprolol    tartrate Injectable 2.5 milliGRAM(s) IV Push every 6 hours PRN    guaiFENesin  milliGRAM(s) Oral every 12 hours PRN    ibuprofen  Tablet 800 milliGRAM(s) Oral every 6 hours PRN  morphine  - Injectable 2 milliGRAM(s) IV Push every 6 hours PRN        aluminum hydroxide/magnesium hydroxide/simethicone Suspension 30 milliLiter(s) Oral every 4 hours PRN      dextrose 50% Injectable 12.5 Gram(s) IV Push once  dextrose 50% Injectable 25 Gram(s) IV Push once  dextrose 50% Injectable 25 Gram(s) IV Push once  dextrose Gel 1 Dose(s) Oral once PRN  glucagon  Injectable 1 milliGRAM(s) IntraMuscular once PRN  insulin lispro (HumaLOG) corrective regimen sliding scale   SubCutaneous three times a day before meals  insulin lispro (HumaLOG) corrective regimen sliding scale   SubCutaneous at bedtime    dextrose 5%. 1000 milliLiter(s) IV Continuous <Continuous>  lactated ringers Bolus 1000 milliLiter(s) IV Bolus once  sodium bicarbonate 650 milliGRAM(s) Oral two times a day  sodium chloride 0.9%. 1000 milliLiter(s) IV Continuous <Continuous>      nystatin Cream 1 Application(s) Topical two times a day      CONSTITUTIONAL: fever and chills.  Mouth/Throat: Thrush noted on tongue  CARDIOVASCULAR: no chest pain and no edema.  RESPIRATORY: tachypnea  Gastrointestinal : RUQ abdominal pain  GENITOURINARY: no dysuria, no frequency, and no hematuria.  MUSCULOSKELETAL: no back pain, no musculoskeletal pain, no neck pain, and no weakness.  SKIN: no abrasions, no jaundice, no lesions, no pruritis, and no rashes.  NEURO: Responsive, alert      Vital Signs Last 24 Hrs  T(C): 39.3 (15 Julian 2018 15:13), Max: 39.3 (15 Julian 2018 15:13)  T(F): 102.8 (15 Julian 2018 15:13), Max: 102.8 (15 Julian 2018 15:13)  HR: 159 (15 Julian 2018 15:13) (96 - 159)  BP: 127/95 (15 Julian 2018 15:13) (99/57 - 143/69)  BP(mean): 73 (15 Julian 2018 04:00) (73 - 102)  RR: 28 (15 Julian 2018 15:13) (17 - 28)  SpO2: 100% (15 Julian 2018 15:13) (95% - 100%)    ABG - ( 15 Julian 2018 16:32 )  pH: 7.39  /  pCO2: 27    /  pO2: 101   / HCO3: 18    / Base Excess: -7.7  /  SaO2: 98        I&O's Detail    14 Jan 2018 07:01  -  15 Julian 2018 07:00  --------------------------------------------------------  IN:    Oral Fluid: 360 mL  Total IN: 360 mL    OUT:  Total OUT: 0 mL    Total NET: 360 mL      15 Julian 2018 07:01  -  15 Julian 2018 17:12  --------------------------------------------------------  IN:    Lactated Ringers IV Bolus: 1000 mL    Oral Fluid: 360 mL    sodium chloride 0.9%.: 600 mL  Total IN: 1960 mL    OUT:    Voided: 575 mL  Total OUT: 575 mL    Total NET: 1385 mL    PHYSICAL EXAM-  GENERAL: lethargic with fevers  HEAD:  Atraumatic, Normocephalic  EYES: EOMI, PERRLA, conjunctiva and sclera clear  ENMT: Moist mucous membranes, No lesions  NECK: Supple, No JVD, Normal thyroid  NERVOUS SYSTEM:  Alert & Oriented X 3, Motor Strength 5/5 B/L upper and lower extremities;   CHEST/LUNG: Bilateral basal rales, No rubs  HEART: Regular rate and rhythm; No murmurs, rubs, or gallops  ABDOMEN: Soft, RUQ tenderness to palpation, no guarding or rigidity.  Bowel sounds present  EXTREMITIES:  2+ Peripheral Pulses, No clubbing, cyanosis, or edema  LYMPH: No lymphadenopathy noted  SKIN: No rashes or lesions        LABS:                        9.8    2.6   )-----------( 50       ( 15 Julian 2018 06:22 )             29.1     01-15    136  |  103  |  33.0<H>  ----------------------------<  70  3.6   |  16.0<L>  |  1.02    Ca    9.1      15 Julian 2018 06:22  Phos  4.4     01-15  Mg     1.7     01-15    TPro  4.6<L>  /  Alb  2.0<L>  /  TBili  2.1<H>  /  DBili  2.0<H>  /  AST  279<H>  /  ALT  51<H>  /  AlkPhos  206<H>  01-15          CAPILLARY BLOOD GLUCOSE      POCT Blood Glucose.: 147 mg/dL (15 Julian 2018 11:21)        CULTURES:  Culture Results:   Testing in progress (01-14 @ 16:21)  Culture Results:   No Cryptosporidium, Cyclospora sp. or Isospora found by modified acid  fast stain (01-13 @ 07:30)  Culture Results:   No Microsporidia species found  by modified trichrome stain (01-13 @ 07:30)  Culture Results:   Testing in progress (01-12 @ 22:51)  Culture Results:   Testing in progress (01-12 @ 19:48)  Culture Results:   Norovirus GI/GII  DETECTED by PCR  *******Please Note:*******  GI panel PCR evaluates for:  Campylobacter, Plesiomonas shigelloides, Salmonella,  Vibrio, Yersinia enterocolitica, Enteroaggregative  Escherichia coli (EAEC), Enteropathogenic E.coli (EPEC),  Enterotoxigenic E. coli (ETEC) lt/st, Shiga-like  toxin-producing E. coli (STEC) stx1/stx2,  Shigella/ Enteroinvasive E. coli (EIEC), Cryptosporidium,  Cyclospora cayetanensis, Entamoeba histolytica,  Giardia lamblia, Adenovirus F 40/41, Astrovirus,  Norovirus GI/GII, Rotavirus A, Sapovirus (01-11 @ 08:31)  Culture Results:   No growth (01-10 @ 02:43)  Culture Results:   No growth at 5 days. (01-10 @ 02:16)  Culture Results:   No growth at 5 days. (01-10 @ 01:49)  Culture Results:   No Protozoa seen by trichrome stain  No Helminths or Protozoa seen in formalin concentrate  performed by iodine stain  (routine O+P not evaluated for Microsporidia,  Cryptosporidia, Cyclospora, or Isospora.)  Note: One negative specimen does not rule  out the possibility of a parasitic infection. (01-09 @ 18:36)  Culture Results:   Giardia antigen negative performed by rapid immunoassay (non-enzymatic).  (It is recommended that all specimens tested by  an immunochromatographic card test be  confirmed by another method.) (01-09 @ 18:36)  Culture Results:   No Protozoa seen by trichrome stain  No Helminths or Protozoa seen in formalin concentrate  performed by iodine stain  (routine O+P not evaluated for Microsporidia,  Cryptosporidia, Cyclospora, or Isospora.)  Note: One negative specimen does not rule  out the possibility of a parasitic infection. (01-09 @ 18:36)  Culture Results:   No enteric gram negative rods isolated  No enteric pathogens isolated.  (Stool culture examined for Salmonella,  Shigella, Campylobacter, Aeromonas, Plesiomonas,  Vibrio, E.coli O157 and Yersinia) (01-09 @ 11:01)  C Diff by PCR Result: NotDetec (01-09 @ 11:00)  Culture Results:   No growth at 5 days. (01-08 @ 19:42)  Culture Results:   No growth at 5 days. (01-08 @ 19:41)  Culture Results:   No enteric pathogens isolated.  (Stool culture examined for Salmonella,  Shigella, Campylobacter, Aeromonas, Plesiomonas,  Vibrio, E.coli O157 and Yersinia) (01-08 @ 17:48)      RADIOLOGY:    < from: Xray Chest 1 View AP-PORTABLE IMMEDIATE (01.15.18 @ 15:41) >   EXAM:  XR CHEST PORTABLE IMMED 1V                          PROCEDURE DATE:  01/15/2018          INTERPRETATION:  Clinical information: Shortness of breath    Portable AP radiograph of the chest was performed.    Comparison: 1/12/2018    The lungs are clear and the heart is normal. The osseous structures are   unremarkable. There are surgical clips in the left side of the neck.      Impression:    No acute pulmonary disease.    LAVON BOWMAN M.D., ATTENDING RADIOLOGIST  This document has been electronically signed. Julian 15 2018  4:42PM    < end of copied text >    < from: CT Angio Chest w/ IV Cont (01.13.18 @ 00:39) >  EXAM:  CT ANGIO CHEST (W)AW IC                          PROCEDURE DATE:  01/13/2018          INTERPRETATION:  CLINICAL HISTORY: Fever and diarrhea. Rule out pulmonary   embolism.     COMPARISON: CTA chest 8/28/2017    TECHNIQUE: Thoracic CT scan with pulmonary embolism protocol was   performed with the administration of intravenous contrast.    Total of 58 cc of Omnipaque 350 was injected intravenously. Coronal,   sagittal, and sagittal oblique reformations were obtained. Axial MIP   images were obtained.    FINDINGS:     There is no evidence of pulmonary embolism.     The thoracic aorta and main pulmonary artery are normal in caliber. The   heart is normal size.      Peripherally oriented subpleural airspace opacities in the left lung are  nonspecific and may reflect areas of infectious or inflammatory process,   not limited to entity such as septic emboli.    Minimal bibasilar dependent changes are seen posteriorly.     Small right pleural effusion noted. No pericardial effusion. There is no   pneumothorax or pneumomediastinum.    There is no axillary, mediastinal, or hilar lymphadenopathy.     Limited images of the upper abdomen shows nonspecific indeterminate   hepatic dome rim calcification, unchanged.    Advanced multilevel thoracic spondylosis. T6 osseous measurement noted.    IMPRESSION:     No evidence of pulmonary embolism.    Peripherally oriented subpleural left-sided airspace opacities may   reflect foci of infection. Small right pleural effusion. Follow-up to   resolution.    MARANDA SOLANO M.D., ATTENDING RADIOLOGIST  This document has been electronically signed. Jan 13 2018  1:27AM    < end of copied text >      < from: CT Abdomen and Pelvis w/ Oral Cont and w/ IV Cont (01.08.18 @ 20:37) >   EXAM:  CT ABDOMEN AND PELVIS OC IC                          PROCEDURE DATE:  01/08/2018          INTERPRETATION:  Contrast enhanced CT of the abdomen and pelvis .  COMPARISON: 12/29/2017 normal CT scan.    CLINICAL HISTORY:    .    Technique: contiguous axial images were obtained with 2.5 mm slice   thickness after intravenous and oral contrast administration.  Coronal and sagittal reformats were also submitted for interpretation.    100 ml of Omnipaque were injected intravenously, and 0 ml were discarded,   without complications noted.     FINDINGS:     A tree-in-bud pattern of opacity seen within the periphery of right lower   lobe likely infectious in etiology.  There is no free intra-abdominal air or ascites.  Multiple lung small subhepatic abscess with a peripheral calcifications   noted in the right and left hepatic lobes likely indicating a   echinococcus cysts. Left hepatic lobe cyst measures 1.1 cm and the right   hepatic lobe cysts measure 1.1 and 0.9 cm of. No other lesions seen. Is   mild hepatomegaly.  The, spleen, pancreas, adrenal glands, gallbladder are normal.    There is no intra or extrahepatic biliary ductal dilatation.    The stomach, duodenum, small and large bowel and appendix are within   normal limits.    Both kidneys show normal uptake of contrast media without masses or   hydronephrosis.      The urinary bladder shows normal morphology and contour.    The reproductive organs are within normal limits.       There are no retroperitoneal masses or abnormal lymphadenopathy.  The retroperitoneal vessels are normal.      There is degenerative spondylosis of thoracal lumbar spine with a   posterior disc ridge complexes@the L3-L4 T12-L1 and T11-T12 disc level   causing mild central canal stenosis .   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    TREASURE SALAS M.D., ATTENDING RADIOLOGIST  This document has been electronically signed. Jan 8 2018  9:19PM          < end of copied text >

## 2018-01-15 NOTE — CONSULT NOTE ADULT - ASSESSMENT
66 yo F with h/o DM, GERD, HTN, HLD here with possible echinococcal infection. Found to be HIV positive. Patient has been having fevers, tachycardia and has become more lethargic over the last 24 hours  Patient has been having increased RUQ abdominal pain. LFT's elevated.

## 2018-01-15 NOTE — PROGRESS NOTE ADULT - PROBLEM SELECTOR PLAN 1
norovirus GE   Stool cultures negative. Stool O&P negative x 2. Stool giardia negative.  C. diff negative.  CMV IgM negative  +Norovirus  diarrhea improving well clinically

## 2018-01-15 NOTE — CONSULT NOTE ADULT - PROBLEM SELECTOR RECOMMENDATION 9
give 2 L bolus of IVF  order lactate, ABG  Consider starting broad spectrum antibiotics  MRI abdomen  Ultrasound Abdomen.    Patient is not a Candidate for ICU level care at this time.    Re-consult as needed. give 2 L bolus of IVF  order lactate, ABG, blood culture, urine culture cbc, cmp  Consider starting broad spectrum antibiotics, discuss case with Infectious disease who is following patient.   MRI abdomen  Ultrasound Abdomen.    Patient is not a Candidate for ICU level care at this time.    Re-consult as needed. continue sepsis protocol IVF  30cc/kg  repeat lactate, order ABG, blood culture, urine culture cbc, cmp  Consider starting broad spectrum antibiotics, discuss case with Infectious disease who is following patient.   MRI abdomen  Ultrasound Abdomen.    At this point patient does not require admission to ICU   If patient condition deteriorates, we can re-evaluate at that time.

## 2018-01-15 NOTE — PROGRESS NOTE ADULT - SUBJECTIVE AND OBJECTIVE BOX
INTERVAL HPI/OVERNIGHT EVENTS: Patient is still continuing with diarrhea. She is feeling nauseous, but no vomiting.     MEDICATIONS  (STANDING):  clotrimazole Lozenge 1 Lozenge Oral five times a day  dextrose 5%. 1000 milliLiter(s) (50 mL/Hr) IV Continuous <Continuous>  dextrose 50% Injectable 12.5 Gram(s) IV Push once  dextrose 50% Injectable 25 Gram(s) IV Push once  dextrose 50% Injectable 25 Gram(s) IV Push once  insulin lispro (HumaLOG) corrective regimen sliding scale   SubCutaneous three times a day before meals  insulin lispro (HumaLOG) corrective regimen sliding scale   SubCutaneous at bedtime  nystatin Cream 1 Application(s) Topical two times a day  sodium bicarbonate 650 milliGRAM(s) Oral two times a day  sodium chloride 0.9%. 1000 milliLiter(s) (50 mL/Hr) IV Continuous <Continuous>  sodium chloride 0.9%. 1000 milliLiter(s) (150 mL/Hr) IV Continuous <Continuous>    MEDICATIONS  (PRN):  aluminum hydroxide/magnesium hydroxide/simethicone Suspension 30 milliLiter(s) Oral every 4 hours PRN Dyspepsia  dextrose Gel 1 Dose(s) Oral once PRN Blood Glucose LESS THAN 70 milliGRAM(s)/deciliter  glucagon  Injectable 1 milliGRAM(s) IntraMuscular once PRN Glucose LESS THAN 70 milligrams/deciliter  guaiFENesin  milliGRAM(s) Oral every 12 hours PRN Cough  ibuprofen  Tablet 800 milliGRAM(s) Oral every 6 hours PRN Tmax 100.4  metoprolol    tartrate Injectable 2.5 milliGRAM(s) IV Push every 6 hours PRN HR>110  morphine  - Injectable 2 milliGRAM(s) IV Push every 6 hours PRN Moderate Pain (4 - 6)      Allergies    No Known Allergies    Intolerances        Vital Signs Last 24 Hrs  T(C): 36.9 (15 Julian 2018 11:30), Max: 38.9 (14 Jan 2018 16:17)  T(F): 98.5 (15 Julian 2018 11:30), Max: 102.1 (14 Jan 2018 16:17)  HR: 107 (15 Julian 2018 11:30) (96 - 140)  BP: 132/68 (15 Julian 2018 11:22) (99/57 - 132/68)  BP(mean): 73 (15 Julian 2018 04:00) (73 - 102)  RR: 19 (15 Julian 2018 11:22) (17 - 29)  SpO2: 98% (15 Julian 2018 11:22) (95% - 99%)    LABS:                        9.8    2.6   )-----------( 50       ( 15 Julian 2018 06:22 )             29.1     01-15    136  |  103  |  33.0<H>  ----------------------------<  70  3.6   |  16.0<L>  |  1.02    Ca    9.1      15 Julian 2018 06:22  Phos  3.2     01-13  Mg     2.1     01-13    TPro  4.6<L>  /  Alb  2.0<L>  /  TBili  2.1<H>  /  DBili  2.0<H>  /  AST  279<H>  /  ALT  51<H>  /  AlkPhos  206<H>  01-15          RADIOLOGY & ADDITIONAL TESTS: INTERVAL HPI/OVERNIGHT EVENTS: Patient is still continuing with diarrhea. She is feeling nauseous, but no vomiting. Stool PCR positive for norovirus. HIV VL and CD4 count still pending.     MEDICATIONS  (STANDING):  clotrimazole Lozenge 1 Lozenge Oral five times a day  dextrose 5%. 1000 milliLiter(s) (50 mL/Hr) IV Continuous <Continuous>  dextrose 50% Injectable 12.5 Gram(s) IV Push once  dextrose 50% Injectable 25 Gram(s) IV Push once  dextrose 50% Injectable 25 Gram(s) IV Push once  insulin lispro (HumaLOG) corrective regimen sliding scale   SubCutaneous three times a day before meals  insulin lispro (HumaLOG) corrective regimen sliding scale   SubCutaneous at bedtime  nystatin Cream 1 Application(s) Topical two times a day  sodium bicarbonate 650 milliGRAM(s) Oral two times a day  sodium chloride 0.9%. 1000 milliLiter(s) (50 mL/Hr) IV Continuous <Continuous>  sodium chloride 0.9%. 1000 milliLiter(s) (150 mL/Hr) IV Continuous <Continuous>    MEDICATIONS  (PRN):  aluminum hydroxide/magnesium hydroxide/simethicone Suspension 30 milliLiter(s) Oral every 4 hours PRN Dyspepsia  dextrose Gel 1 Dose(s) Oral once PRN Blood Glucose LESS THAN 70 milliGRAM(s)/deciliter  glucagon  Injectable 1 milliGRAM(s) IntraMuscular once PRN Glucose LESS THAN 70 milligrams/deciliter  guaiFENesin  milliGRAM(s) Oral every 12 hours PRN Cough  ibuprofen  Tablet 800 milliGRAM(s) Oral every 6 hours PRN Tmax 100.4  metoprolol    tartrate Injectable 2.5 milliGRAM(s) IV Push every 6 hours PRN HR>110  morphine  - Injectable 2 milliGRAM(s) IV Push every 6 hours PRN Moderate Pain (4 - 6)      Allergies    No Known Allergies    Intolerances        Vital Signs Last 24 Hrs  T(C): 36.9 (15 Julian 2018 11:30), Max: 38.9 (14 Jan 2018 16:17)  T(F): 98.5 (15 Julian 2018 11:30), Max: 102.1 (14 Jan 2018 16:17)  HR: 107 (15 Julian 2018 11:30) (96 - 140)  BP: 132/68 (15 Julian 2018 11:22) (99/57 - 132/68)  BP(mean): 73 (15 Julian 2018 04:00) (73 - 102)  RR: 19 (15 Julian 2018 11:22) (17 - 29)  SpO2: 98% (15 Julian 2018 11:22) (95% - 99%)    LABS:                        9.8    2.6   )-----------( 50       ( 15 Julian 2018 06:22 )             29.1     01-15    136  |  103  |  33.0<H>  ----------------------------<  70  3.6   |  16.0<L>  |  1.02    Ca    9.1      15 Julian 2018 06:22  Phos  3.2     01-13  Mg     2.1     01-13    TPro  4.6<L>  /  Alb  2.0<L>  /  TBili  2.1<H>  /  DBili  2.0<H>  /  AST  279<H>  /  ALT  51<H>  /  AlkPhos  206<H>  01-15          RADIOLOGY & ADDITIONAL TESTS:  < from: CT Angio Chest w/ IV Cont (01.13.18 @ 00:39) >    IMPRESSION:     No evidence of pulmonary embolism.    Peripherally oriented subpleural left-sided airspace opacities may   reflect foci of infection. Small right pleural effusion. Follow-up to   resolution.    < end of copied text >

## 2018-01-15 NOTE — PROGRESS NOTE ADULT - SUBJECTIVE AND OBJECTIVE BOX
[facility]  INFECTIOUS DISEASES AND INTERNAL MEDICINE at [place]  =======================================================  Raymon Ortiz MD  Diplomates American Board of Internal Medicine and Infectious Diseases  =======================================================    CLOUDZACARIASSARA [id number]  Seen with      Follow up: Diarrhea    Febrile  No complaints  Still has diarrhea    Allergies:  No Known Allergies      Antibiotics:  None      REVIEW OF SYSTEMS:  CONSTITUTIONAL:  + Fever + chills  HEENT:  No diplopia or blurred vision.  No earache, sore throat or runny nose.  CARDIOVASCULAR:  No pressure, squeezing, strangling, tightness, heaviness or aching about the chest, neck, axilla or epigastrium.  RESPIRATORY:  No cough, shortness of breath  GASTROINTESTINAL:  + diarrhea.  GENITOURINARY:  No dysuria, frequency or urgency. No Blood in urine  MUSCULOSKELETAL:  no joint aches, no muscle pain  SKIN:  No change in skin, hair or nails.  NEUROLOGIC:  No paresthesias, fasciculations  PSYCHIATRIC:  No disorder of thought or mood.  ENDOCRINE:  No heat or cold intolerance  HEMATOLOGICAL:  No easy bruising or bleeding.      Physical Exam:  Vital Signs Last 24 Hrs  T(C): 36.7 (15 Julian 2018 08:27), Max: 38.9 (14 Jan 2018 16:17)  T(F): 98 (15 Julian 2018 08:27), Max: 102.1 (14 Jan 2018 16:17)  HR: 109 (15 Julian 2018 08:27) (96 - 140)  BP: 99/57 (15 Julian 2018 08:27) (99/57 - 127/71)  BP(mean): 73 (15 Julian 2018 04:00) (73 - 102)  RR: 20 (15 Julian 2018 08:27) (17 - 29)  SpO2: 97% (15 Julian 2018 08:27) (95% - 99%)      GEN: NAD, pleasant  HEENT: normocephalic and atraumatic. EOMI. PERRL.    NECK: Supple.   LUNGS: Clear to auscultation.  HEART: Regular rate and rhythm  ABDOMEN: Soft, nontender, and nondistended.  Positive bowel sounds.    : No CVA tenderness  EXTREMITIES: Without any edema.  MSK: No joint swelling  NEUROLOGIC: Cranial nerves II through XII are grossly intact.  PSYCHIATRIC: Appropriate affect .  SKIN: No rash      Labs:  01-15    136  |  103  |  33.0<H>  ----------------------------<  70  3.6   |  16.0<L>  |  1.02    Ca    9.1      15 Julian 2018 06:22  Phos  3.2     01-13  Mg     2.1     01-13    TPro  4.6<L>  /  Alb  2.0<L>  /  TBili  2.1<H>  /  DBili  2.0<H>  /  AST  279<H>  /  ALT  51<H>  /  AlkPhos  206<H>  01-15               9.8    2.6   )-----------( 50       ( 15 Julian 2018 06:22 )             29.1       LIVER FUNCTIONS - ( 15 Julian 2018 06:22 )  Alb: 2.0 g/dL / Pro: 4.6 g/dL / ALK PHOS: 206 U/L / ALT: 51 U/L / AST: 279 U/L / GGT: x               RECENT CULTURES:  01-14 @ 10:38    [facility]      01-12 @ 22:51 .Stool Feces     Testing in progress      01-12 @ 19:48 .Stool Feces     Testing in progress      01-11 @ 08:31 .Stool Feces     Norovirus GI/GII  DETECTED by PCR  *******Please Note:*******  GI panel PCR evaluates for:  Campylobacter, Plesiomonas shigelloides, Salmonella,  Vibrio, Yersinia enterocolitica, Enteroaggregative  Escherichia coli (EAEC), Enteropathogenic E.coli (EPEC),  Enterotoxigenic E. coli (ETEC) lt/st, Shiga-like  toxin-producing E. coli (STEC) stx1/stx2,  Shigella/ Enteroinvasive E. coli (EIEC), Cryptosporidium,  Cyclospora cayetanensis, Entamoeba histolytica,  Giardia lamblia, Adenovirus F 40/41, Astrovirus,  Norovirus GI/GII, Rotavirus A, Sapovirus      01-10 @ 02:43 .Urine Catheterized     No growth      01-10 @ 02:16 .Blood Arterial Catheter     No growth at 5 days.      01-10 @ 01:49 .Blood Blood     No growth at 5 days.      01-09 @ 18:36 .Stool Feces     No Protozoa seen by trichrome stain  No Helminths or Protozoa seen in formalin concentrate  performed by iodine stain  (routine O+P not evaluated for Microsporidia,  Cryptosporidia, Cyclospora, or Isospora.)  Note: One negative specimen does not rule  out the possibility of a parasitic infection.      01-09 @ 11:01 .Stool Feces     No enteric gram negative rods isolated  No enteric pathogens isolated.  (Stool culture examined for Salmonella,  Shigella, Campylobacter, Aeromonas, Plesiomonas,  Vibrio, E.coli O157 and Yersinia)      01-08 @ 19:42 .Blood Blood     No growth at 5 days.      01-08 @ 19:41 .Blood Blood     No growth at 5 days.      01-08 @ 17:48 .Stool Feces     No enteric pathogens isolated.  (Stool culture examined for Salmonella,  Shigella, Campylobacter, Aeromonas, Plesiomonas,  Vibrio, E.coli O157 and Yersinia)      01-02 @ 16:02 .Blood Blood     No growth at 5 days.      01-02 @ 16:01 .Blood Blood     No growth at 5 days.      EXAM:  CT ANGIO CHEST (W)AW IC                        PROCEDURE DATE:  01/13/2018    INTERPRETATION:  CLINICAL HISTORY: Fever and diarrhea. Rule out pulmonary embolism.   COMPARISON: CTA chest 8/28/2017  TECHNIQUE: Thoracic CT scan with pulmonary embolism protocol was   performed with the administration of intravenous contrast.  Total of 58 cc of Omnipaque 350 was injected intravenously. Coronal,   sagittal, and sagittal oblique reformations were obtained. Axial MIP   images were obtained.  FINDINGS:   There is no evidence of pulmonary embolism.   The thoracic aorta and main pulmonary artery are normal in caliber. The   heart is normal size.    Peripherally oriented subpleural airspace opacities in the left lung are  nonspecific and may reflect areas of infectious or inflammatory process,   not limited to entity such as septic emboli.  Minimal bibasilar dependent changes are seen posteriorly.   Small right pleural effusion noted. No pericardial effusion. There is no   pneumothorax or pneumomediastinum.  There is no axillary, mediastinal, or hilar lymphadenopathy.   Limited images of the upper abdomen shows nonspecific indeterminate   hepatic dome rim calcification, unchanged.  Advanced multilevel thoracic spondylosis. T6 osseous measurement noted.  IMPRESSION:   No evidence of pulmonary embolism.  Peripherally oriented subpleural left-sided airspace opacities may   reflect foci of infection. Small right pleural effusion. Follow-up to   resolution.      EXAM:  US ABDOMEN LIMITED                        PROCEDURE DATE:  01/09/2018    INTERPRETATION:  CLINICAL INFORMATION: Hepatic cyst, long-standing right   upper quadrant pain, nausea and diarrhea  COMPARISON: Ultrasound dated 12/25/2017  TECHNIQUE: Sonography of the right upper quadrant.   FINDINGS:  Liver: Increased echogenicity and measures 19.1 cm in length. Calcified   left hepatic lobe cyst measures 9 mm. Calcified right hepatic lobe cyst   measures 1.1 cm.  Bile ducts: Normal caliber. Common bile duct measures 5.5 mm.   Gallbladder: Status post cholecystectomy      Pancreas: Visualized portions are within normal limits.  Right kidney: 10.1 cm. No hydronephrosis.      Ascites: None.  IVC: Visualized portions are within normal limits.  IMPRESSION:   Fatty liver. Calcified hepatic cysts      EXAM:  CT ABDOMEN AND PELVIS OC IC                        PROCEDURE DATE:  01/08/2018    INTERPRETATION:  Contrast enhanced CT of the abdomen and pelvis .  COMPARISON: 12/29/2017 normal CT scan.  CLINICAL HISTORY:    .  Technique: contiguous axial images were obtained with 2.5 mm slice   thickness after intravenous and oral contrast administration.  Coronal and sagittal reformats were also submitted for interpretation.  100 ml of Omnipaque were injected intravenously, and 0 ml were discarded,   without complications noted.   FINDINGS:   A tree-in-bud pattern of opacity seen within the periphery of right lower   lobe likely infectious in etiology.  There is no free intra-abdominal air or ascites.  Multiple lung small subhepatic abscess with a peripheral calcifications   noted in the right and left hepatic lobes likely indicating a   echinococcus cysts. Left hepatic lobe cyst measures 1.1 cm and the right   hepatic lobe cysts measure 1.1 and 0.9 cm of. No other lesions seen. Is mild hepatomegaly.  The, spleen, pancreas, adrenal glands, gallbladder are normal.  There is no intra or extrahepatic biliary ductal dilatation.  The stomach, duodenum, small and large bowel and appendix are within normal limits.  Both kidneys show normal uptake of contrast media without masses or hydronephrosis.    The urinary bladder shows normal morphology and contour.  The reproductive organs are within normal limits.   There are no retroperitoneal masses or abnormal lymphadenopathy.  The retroperitoneal vessels are normal.    There is degenerative spondylosis of thoracal lumbar spine with a   posterior disc ridge complexes@the L3-L4 T12-L1 and T11-T12 disc level   causing mild central canal stenosis .   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

## 2018-01-15 NOTE — PROGRESS NOTE ADULT - PROBLEM SELECTOR PLAN 2
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

## 2018-01-16 DIAGNOSIS — B20 HUMAN IMMUNODEFICIENCY VIRUS [HIV] DISEASE: ICD-10-CM

## 2018-01-16 DIAGNOSIS — A41.9 SEPSIS, UNSPECIFIED ORGANISM: ICD-10-CM

## 2018-01-16 DIAGNOSIS — E11.8 TYPE 2 DIABETES MELLITUS WITH UNSPECIFIED COMPLICATIONS: ICD-10-CM

## 2018-01-16 DIAGNOSIS — E16.2 HYPOGLYCEMIA, UNSPECIFIED: ICD-10-CM

## 2018-01-16 LAB
% ALBUMIN: 48.6 % — SIGNIFICANT CHANGE UP
% ALPHA 1: 12.1 % — SIGNIFICANT CHANGE UP
% ALPHA 2: 13 % — SIGNIFICANT CHANGE UP
% BETA: 12.6 % — SIGNIFICANT CHANGE UP
% GAMMA: 13.7 % — SIGNIFICANT CHANGE UP
4/8 RATIO: 0.04 RATIO — LOW (ref 0.9–3.6)
4/8 RATIO: 0.05 RATIO — SIGNIFICANT CHANGE UP (ref 0.9–3.6)
ABS CD8: 126 /UL — LOW (ref 136–757)
ABS CD8: 49 /UL — LOW (ref 136–757)
ALBUMIN SERPL ELPH-MCNC: 2 G/DL — LOW (ref 3.3–5.2)
ALBUMIN SERPL ELPH-MCNC: 2 G/DL — LOW (ref 3.3–5.2)
ALBUMIN SERPL ELPH-MCNC: 2.4 G/DL — LOW (ref 3.6–5.5)
ALBUMIN/GLOB SERPL ELPH: 1 RATIO — SIGNIFICANT CHANGE UP
ALP SERPL-CCNC: 293 U/L — HIGH (ref 40–120)
ALP SERPL-CCNC: 367 U/L — HIGH (ref 40–120)
ALPHA1 GLOB SERPL ELPH-MCNC: 0.6 G/DL — HIGH (ref 0.1–0.4)
ALPHA2 GLOB SERPL ELPH-MCNC: 0.6 G/DL — SIGNIFICANT CHANGE UP (ref 0.5–1)
ALT FLD-CCNC: 84 U/L — HIGH
ALT FLD-CCNC: 99 U/L — HIGH
ANION GAP SERPL CALC-SCNC: 20 MMOL/L — HIGH (ref 5–17)
ANION GAP SERPL CALC-SCNC: 21 MMOL/L — HIGH (ref 5–17)
APPEARANCE UR: CLEAR — SIGNIFICANT CHANGE UP
APTT BLD: 40.5 SEC — HIGH (ref 27.5–37.4)
AST SERPL-CCNC: 599 U/L — HIGH
AST SERPL-CCNC: 879 U/L — HIGH
B-GLOBULIN SERPL ELPH-MCNC: 0.6 G/DL — SIGNIFICANT CHANGE UP (ref 0.5–1)
BACTERIA # UR AUTO: ABNORMAL
BILIRUB SERPL-MCNC: 2.7 MG/DL — HIGH (ref 0.4–2)
BILIRUB SERPL-MCNC: 2.7 MG/DL — HIGH (ref 0.4–2)
BILIRUB UR-MCNC: ABNORMAL
BLD GP AB SCN SERPL QL: SIGNIFICANT CHANGE UP
BUN SERPL-MCNC: 36 MG/DL — HIGH (ref 8–20)
BUN SERPL-MCNC: 44 MG/DL — HIGH (ref 8–20)
CALCIUM SERPL-MCNC: 10 MG/DL — SIGNIFICANT CHANGE UP (ref 8.6–10.2)
CALCIUM SERPL-MCNC: 9.6 MG/DL — SIGNIFICANT CHANGE UP (ref 8.6–10.2)
CD16+CD56+ CELLS NFR BLD: 14 % — SIGNIFICANT CHANGE UP (ref 4–25)
CD16+CD56+ CELLS NFR BLD: 24 % — SIGNIFICANT CHANGE UP (ref 4–25)
CD16+CD56+ CELLS NFR SPEC: 24 /UL — LOW (ref 64–494)
CD16+CD56+ CELLS NFR SPEC: 70 /UL — SIGNIFICANT CHANGE UP (ref 64–494)
CD19 BLASTS SPEC-ACNC: 33 % — HIGH (ref 5–22)
CD19 BLASTS SPEC-ACNC: 50 % — HIGH (ref 5–22)
CD19 BLASTS SPEC-ACNC: 83 /UL — SIGNIFICANT CHANGE UP (ref 68–528)
CD19 BLASTS SPEC-ACNC: 94 /UL — SIGNIFICANT CHANGE UP (ref 68–528)
CD3 BLASTS SPEC-ACNC: 125 /UL — LOW (ref 799–2171)
CD3 BLASTS SPEC-ACNC: 32 % — LOW (ref 59–85)
CD3 BLASTS SPEC-ACNC: 42 % — LOW (ref 59–85)
CD3 BLASTS SPEC-ACNC: 53 /UL — LOW (ref 799–2171)
CD4 %: 2 % — LOW (ref 36–65)
CD4 %: 2 % — LOW (ref 36–65)
CD8 %: 30 % — SIGNIFICANT CHANGE UP (ref 11–36)
CD8 %: 41 % — HIGH (ref 11–36)
CERULOPLASMIN SERPL-MCNC: 31 MG/DL — SIGNIFICANT CHANGE UP (ref 20–60)
CHLORIDE SERPL-SCNC: 102 MMOL/L — SIGNIFICANT CHANGE UP (ref 98–107)
CHLORIDE SERPL-SCNC: 103 MMOL/L — SIGNIFICANT CHANGE UP (ref 98–107)
CO2 SERPL-SCNC: 15 MMOL/L — LOW (ref 22–29)
CO2 SERPL-SCNC: 18 MMOL/L — LOW (ref 22–29)
COLOR SPEC: YELLOW — SIGNIFICANT CHANGE UP
COMMENT - URINE: SIGNIFICANT CHANGE UP
CREAT SERPL-MCNC: 1.05 MG/DL — SIGNIFICANT CHANGE UP (ref 0.5–1.3)
CREAT SERPL-MCNC: 1.12 MG/DL — SIGNIFICANT CHANGE UP (ref 0.5–1.3)
CULTURE RESULTS: SIGNIFICANT CHANGE UP
DIFF PNL FLD: ABNORMAL
EPI CELLS # UR: ABNORMAL
FERRITIN SERPL-MCNC: SIGNIFICANT CHANGE UP NG/ML (ref 11–306)
FOLATE SERPL-MCNC: 5.6 NG/ML — SIGNIFICANT CHANGE UP (ref 4–16)
GAMMA GLOBULIN: 0.7 G/DL — SIGNIFICANT CHANGE UP (ref 0.6–1.6)
GAS PNL BLDA: SIGNIFICANT CHANGE UP
GLUCOSE BLDC GLUCOMTR-MCNC: 170 MG/DL — HIGH (ref 70–99)
GLUCOSE BLDC GLUCOMTR-MCNC: 189 MG/DL — HIGH (ref 70–99)
GLUCOSE BLDC GLUCOMTR-MCNC: 40 MG/DL — CRITICAL LOW (ref 70–99)
GLUCOSE BLDC GLUCOMTR-MCNC: 47 MG/DL — LOW (ref 70–99)
GLUCOSE BLDC GLUCOMTR-MCNC: 76 MG/DL — SIGNIFICANT CHANGE UP (ref 70–99)
GLUCOSE BLDC GLUCOMTR-MCNC: 83 MG/DL — SIGNIFICANT CHANGE UP (ref 70–99)
GLUCOSE BLDC GLUCOMTR-MCNC: 92 MG/DL — SIGNIFICANT CHANGE UP (ref 70–99)
GLUCOSE BLDC GLUCOMTR-MCNC: 99 MG/DL — SIGNIFICANT CHANGE UP (ref 70–99)
GLUCOSE SERPL-MCNC: 52 MG/DL — LOW (ref 70–115)
GLUCOSE SERPL-MCNC: 63 MG/DL — LOW (ref 70–115)
GLUCOSE UR QL: NEGATIVE MG/DL — SIGNIFICANT CHANGE UP
HCT VFR BLD CALC: 21.4 % — LOW (ref 37–47)
HCT VFR BLD CALC: 24.7 % — LOW (ref 37–47)
HGB BLD-MCNC: 7 G/DL — CRITICAL LOW (ref 12–16)
HGB BLD-MCNC: 8.1 G/DL — LOW (ref 12–16)
INR BLD: 1.76 RATIO — HIGH (ref 0.88–1.16)
INTERPRETATION SERPL IFE-IMP: SIGNIFICANT CHANGE UP
IRON SATN MFR SERPL: 39 % — SIGNIFICANT CHANGE UP (ref 14–50)
IRON SATN MFR SERPL: 61 UG/DL — SIGNIFICANT CHANGE UP (ref 37–145)
KETONES UR-MCNC: ABNORMAL
LACTATE BLDV-MCNC: 5.8 MMOL/L — CRITICAL HIGH (ref 0.5–2)
LACTATE BLDV-MCNC: 6 MMOL/L — CRITICAL HIGH (ref 0.5–2)
LEUKOCYTE ESTERASE UR-ACNC: ABNORMAL
MAGNESIUM SERPL-MCNC: 1.6 MG/DL — SIGNIFICANT CHANGE UP (ref 1.6–2.6)
MAGNESIUM SERPL-MCNC: 1.7 MG/DL — LOW (ref 1.8–2.6)
MCHC RBC-ENTMCNC: 26 PG — LOW (ref 27–31)
MCHC RBC-ENTMCNC: 26.5 PG — LOW (ref 27–31)
MCHC RBC-ENTMCNC: 32.7 G/DL — SIGNIFICANT CHANGE UP (ref 32–36)
MCHC RBC-ENTMCNC: 32.8 G/DL — SIGNIFICANT CHANGE UP (ref 32–36)
MCV RBC AUTO: 79.4 FL — LOW (ref 81–99)
MCV RBC AUTO: 81.1 FL — SIGNIFICANT CHANGE UP (ref 81–99)
NITRITE UR-MCNC: NEGATIVE — SIGNIFICANT CHANGE UP
PH UR: 5 — SIGNIFICANT CHANGE UP (ref 5–8)
PHOSPHATE SERPL-MCNC: 5.5 MG/DL — HIGH (ref 2.4–4.7)
PLATELET # BLD AUTO: 108 K/UL — LOW (ref 150–400)
PLATELET # BLD AUTO: 81 K/UL — LOW (ref 150–400)
POTASSIUM SERPL-MCNC: 4.1 MMOL/L — SIGNIFICANT CHANGE UP (ref 3.5–5.3)
POTASSIUM SERPL-MCNC: 4.4 MMOL/L — SIGNIFICANT CHANGE UP (ref 3.5–5.3)
POTASSIUM SERPL-SCNC: 4.1 MMOL/L — SIGNIFICANT CHANGE UP (ref 3.5–5.3)
POTASSIUM SERPL-SCNC: 4.4 MMOL/L — SIGNIFICANT CHANGE UP (ref 3.5–5.3)
PROT PATTERN SERPL ELPH-IMP: SIGNIFICANT CHANGE UP
PROT SERPL-MCNC: 4.6 G/DL — LOW (ref 6.6–8.7)
PROT SERPL-MCNC: 4.8 G/DL — LOW (ref 6.6–8.7)
PROT SERPL-MCNC: 4.9 G/DL — LOW (ref 6–8.3)
PROT SERPL-MCNC: 4.9 G/DL — LOW (ref 6–8.3)
PROT UR-MCNC: 30 MG/DL
PROTHROM AB SERPL-ACNC: 19.6 SEC — HIGH (ref 9.8–12.7)
RBC # BLD: 2.64 M/UL — LOW (ref 4.4–5.2)
RBC # BLD: 3.11 M/UL — LOW (ref 4.4–5.2)
RBC # FLD: 16.1 % — HIGH (ref 11–15.6)
RBC # FLD: 16.4 % — HIGH (ref 11–15.6)
RBC CASTS # UR COMP ASSIST: SIGNIFICANT CHANGE UP /HPF (ref 0–4)
SODIUM SERPL-SCNC: 138 MMOL/L — SIGNIFICANT CHANGE UP (ref 135–145)
SODIUM SERPL-SCNC: 141 MMOL/L — SIGNIFICANT CHANGE UP (ref 135–145)
SP GR SPEC: 1.01 — SIGNIFICANT CHANGE UP (ref 1.01–1.02)
SPECIMEN SOURCE: SIGNIFICANT CHANGE UP
T-CELL CD4 SUBSET PNL BLD: 3 /UL — LOW (ref 489–1457)
T-CELL CD4 SUBSET PNL BLD: 5 /UL — LOW (ref 489–1457)
TIBC SERPL-MCNC: 156 UG/DL — LOW (ref 220–430)
TRANSFERRIN SERPL-MCNC: 109 MG/DL — LOW (ref 192–382)
TYPE + AB SCN PNL BLD: SIGNIFICANT CHANGE UP
UROBILINOGEN FLD QL: 1 MG/DL
VIT B12 SERPL-MCNC: >1500 PG/ML — HIGH (ref 180–914)
WBC # BLD: 4.4 K/UL — LOW (ref 4.8–10.8)
WBC # BLD: 5.5 K/UL — SIGNIFICANT CHANGE UP (ref 4.8–10.8)
WBC # FLD AUTO: 4.4 K/UL — LOW (ref 4.8–10.8)
WBC # FLD AUTO: 5.5 K/UL — SIGNIFICANT CHANGE UP (ref 4.8–10.8)
WBC UR QL: SIGNIFICANT CHANGE UP /HPF (ref 0–5)

## 2018-01-16 PROCEDURE — 74177 CT ABD & PELVIS W/CONTRAST: CPT | Mod: 26

## 2018-01-16 PROCEDURE — 99291 CRITICAL CARE FIRST HOUR: CPT

## 2018-01-16 PROCEDURE — 71260 CT THORAX DX C+: CPT | Mod: 26

## 2018-01-16 PROCEDURE — 99233 SBSQ HOSP IP/OBS HIGH 50: CPT

## 2018-01-16 PROCEDURE — 71045 X-RAY EXAM CHEST 1 VIEW: CPT | Mod: 26

## 2018-01-16 RX ORDER — ACETAMINOPHEN 500 MG
1000 TABLET ORAL ONCE
Qty: 0 | Refills: 0 | Status: COMPLETED | OUTPATIENT
Start: 2018-01-16 | End: 2018-01-16

## 2018-01-16 RX ORDER — SODIUM CHLORIDE 9 MG/ML
500 INJECTION, SOLUTION INTRAVENOUS ONCE
Qty: 0 | Refills: 0 | Status: COMPLETED | OUTPATIENT
Start: 2018-01-16 | End: 2018-01-16

## 2018-01-16 RX ORDER — ASCORBIC ACID 60 MG
1500 TABLET,CHEWABLE ORAL
Qty: 0 | Refills: 0 | Status: DISCONTINUED | OUTPATIENT
Start: 2018-01-16 | End: 2018-01-16

## 2018-01-16 RX ORDER — HYDROCORTISONE 20 MG
50 TABLET ORAL EVERY 6 HOURS
Qty: 0 | Refills: 0 | Status: DISCONTINUED | OUTPATIENT
Start: 2018-01-16 | End: 2018-01-18

## 2018-01-16 RX ORDER — SODIUM BICARBONATE 1 MEQ/ML
0.19 SYRINGE (ML) INTRAVENOUS
Qty: 150 | Refills: 0 | Status: DISCONTINUED | OUTPATIENT
Start: 2018-01-16 | End: 2018-01-18

## 2018-01-16 RX ORDER — ETHAMBUTOL HYDROCHLORIDE 400 MG/1
800 TABLET, FILM COATED ORAL DAILY
Qty: 0 | Refills: 0 | Status: DISCONTINUED | OUTPATIENT
Start: 2018-01-16 | End: 2018-01-18

## 2018-01-16 RX ORDER — DEXTROSE 50 % IN WATER 50 %
25 SYRINGE (ML) INTRAVENOUS ONCE
Qty: 0 | Refills: 0 | Status: COMPLETED | OUTPATIENT
Start: 2018-01-16 | End: 2018-01-16

## 2018-01-16 RX ORDER — METRONIDAZOLE 500 MG
TABLET ORAL
Qty: 0 | Refills: 0 | Status: DISCONTINUED | OUTPATIENT
Start: 2018-01-16 | End: 2018-01-18

## 2018-01-16 RX ORDER — ASCORBIC ACID 60 MG
1500 TABLET,CHEWABLE ORAL EVERY 6 HOURS
Qty: 0 | Refills: 0 | Status: DISCONTINUED | OUTPATIENT
Start: 2018-01-16 | End: 2018-01-18

## 2018-01-16 RX ORDER — MORPHINE SULFATE 50 MG/1
1 CAPSULE, EXTENDED RELEASE ORAL ONCE
Qty: 0 | Refills: 0 | Status: DISCONTINUED | OUTPATIENT
Start: 2018-01-16 | End: 2018-01-16

## 2018-01-16 RX ORDER — RIFABUTIN 150 MG/1
300 CAPSULE ORAL DAILY
Qty: 0 | Refills: 0 | Status: DISCONTINUED | OUTPATIENT
Start: 2018-01-16 | End: 2018-01-18

## 2018-01-16 RX ORDER — METRONIDAZOLE 500 MG
500 TABLET ORAL EVERY 8 HOURS
Qty: 0 | Refills: 0 | Status: DISCONTINUED | OUTPATIENT
Start: 2018-01-16 | End: 2018-01-18

## 2018-01-16 RX ORDER — ETHAMBUTOL HYDROCHLORIDE 400 MG/1
800 TABLET, FILM COATED ORAL EVERY OTHER DAY
Qty: 0 | Refills: 0 | Status: DISCONTINUED | OUTPATIENT
Start: 2018-01-16 | End: 2018-01-16

## 2018-01-16 RX ORDER — CEFEPIME 1 G/1
2000 INJECTION, POWDER, FOR SOLUTION INTRAMUSCULAR; INTRAVENOUS EVERY 24 HOURS
Qty: 0 | Refills: 0 | Status: DISCONTINUED | OUTPATIENT
Start: 2018-01-16 | End: 2018-01-18

## 2018-01-16 RX ORDER — SODIUM CHLORIDE 9 MG/ML
1000 INJECTION, SOLUTION INTRAVENOUS ONCE
Qty: 0 | Refills: 0 | Status: COMPLETED | OUTPATIENT
Start: 2018-01-16 | End: 2018-01-16

## 2018-01-16 RX ORDER — METRONIDAZOLE 500 MG
500 TABLET ORAL ONCE
Qty: 0 | Refills: 0 | Status: COMPLETED | OUTPATIENT
Start: 2018-01-16 | End: 2018-01-16

## 2018-01-16 RX ORDER — RIFABUTIN 150 MG/1
150 CAPSULE ORAL DAILY
Qty: 0 | Refills: 0 | Status: DISCONTINUED | OUTPATIENT
Start: 2018-01-16 | End: 2018-01-16

## 2018-01-16 RX ORDER — AZITHROMYCIN 500 MG/1
600 TABLET, FILM COATED ORAL DAILY
Qty: 0 | Refills: 0 | Status: DISCONTINUED | OUTPATIENT
Start: 2018-01-16 | End: 2018-01-17

## 2018-01-16 RX ORDER — THIAMINE MONONITRATE (VIT B1) 100 MG
200 TABLET ORAL
Qty: 0 | Refills: 0 | Status: DISCONTINUED | OUTPATIENT
Start: 2018-01-16 | End: 2018-01-18

## 2018-01-16 RX ORDER — LEVALBUTEROL 1.25 MG/.5ML
1.25 SOLUTION, CONCENTRATE RESPIRATORY (INHALATION)
Qty: 0 | Refills: 0 | Status: COMPLETED | OUTPATIENT
Start: 2018-01-16 | End: 2018-01-16

## 2018-01-16 RX ORDER — DEXTROSE 50 % IN WATER 50 %
1 SYRINGE (ML) INTRAVENOUS ONCE
Qty: 0 | Refills: 0 | Status: DISCONTINUED | OUTPATIENT
Start: 2018-01-16 | End: 2018-01-18

## 2018-01-16 RX ADMIN — NYSTATIN CREAM 1 APPLICATION(S): 100000 CREAM TOPICAL at 15:57

## 2018-01-16 RX ADMIN — Medication 125 MEQ/KG/HR: at 11:18

## 2018-01-16 RX ADMIN — SODIUM CHLORIDE 1000 MILLILITER(S): 9 INJECTION, SOLUTION INTRAVENOUS at 22:00

## 2018-01-16 RX ADMIN — MORPHINE SULFATE 1 MILLIGRAM(S): 50 CAPSULE, EXTENDED RELEASE ORAL at 04:41

## 2018-01-16 RX ADMIN — NYSTATIN CREAM 1 APPLICATION(S): 100000 CREAM TOPICAL at 07:01

## 2018-01-16 RX ADMIN — Medication 100 MILLIGRAM(S): at 18:18

## 2018-01-16 RX ADMIN — Medication 125 MEQ/KG/HR: at 23:26

## 2018-01-16 RX ADMIN — Medication 650 MILLIGRAM(S): at 07:00

## 2018-01-16 RX ADMIN — MORPHINE SULFATE 1 MILLIGRAM(S): 50 CAPSULE, EXTENDED RELEASE ORAL at 07:01

## 2018-01-16 RX ADMIN — CEFEPIME 100 MILLIGRAM(S): 1 INJECTION, POWDER, FOR SOLUTION INTRAMUSCULAR; INTRAVENOUS at 10:31

## 2018-01-16 RX ADMIN — Medication 1000 MILLIGRAM(S): at 21:30

## 2018-01-16 RX ADMIN — Medication 25 GRAM(S): at 08:09

## 2018-01-16 RX ADMIN — SODIUM CHLORIDE 16.67 MILLILITER(S): 9 INJECTION, SOLUTION INTRAVENOUS at 21:14

## 2018-01-16 RX ADMIN — LEVALBUTEROL 1.25 MILLIGRAM(S): 1.25 SOLUTION, CONCENTRATE RESPIRATORY (INHALATION) at 05:42

## 2018-01-16 RX ADMIN — Medication 400 MILLIGRAM(S): at 21:13

## 2018-01-16 RX ADMIN — SODIUM CHLORIDE 3000 MILLILITER(S): 9 INJECTION, SOLUTION INTRAVENOUS at 08:09

## 2018-01-16 RX ADMIN — Medication 50 MILLIGRAM(S): at 18:20

## 2018-01-16 RX ADMIN — LEVALBUTEROL 1.25 MILLIGRAM(S): 1.25 SOLUTION, CONCENTRATE RESPIRATORY (INHALATION) at 05:17

## 2018-01-16 RX ADMIN — Medication 10 MILLIGRAM(S): at 07:00

## 2018-01-16 RX ADMIN — Medication 1 LOZENGE: at 02:35

## 2018-01-16 RX ADMIN — Medication 1 LOZENGE: at 15:56

## 2018-01-16 RX ADMIN — Medication 102 MILLIGRAM(S): at 20:00

## 2018-01-16 RX ADMIN — Medication 800 MILLIGRAM(S): at 03:37

## 2018-01-16 RX ADMIN — Medication 1 TABLET(S): at 15:56

## 2018-01-16 NOTE — CONSULT NOTE ADULT - PROBLEM SELECTOR RECOMMENDATION 5
dextrose   hourly accuchecks and replete glucose as needed for glucose <60
Hepatitis profile is negative  GI eval

## 2018-01-16 NOTE — PROGRESS NOTE ADULT - PROBLEM SELECTOR PLAN 1
Likely secondary to Norovirus infection. Rx as per ID. Diarrhea is improving but pt. now possibly septic with fever, tachycardia and hypotension.

## 2018-01-16 NOTE — PROGRESS NOTE ADULT - PROBLEM SELECTOR PLAN 9
episode yesterday. now resolved. still on O2 NC. darrell.   await ID to decide about bactrim and steroids for PCP.   likely viral respiratory infection. RVP ordered.
with sinus tachycardia. unclear etiology. per radiology CT chest findings can be the the etiology of infection.  RVP neg. blood c/s testing. non acute MRi of liver.
Normotensive  Monitor BP
Normotensive  Monitor BP
Sliding scale   accuchecks  a1C 5.3 - NOT diabetic
Sliding scale   accuchecks  a1C 5.3 - NOT diabetic
episode yesterday. now resolved. still on O2 NC. darrell.   await ID to decide about bactrim and steroids for PCP.   likely viral respiratory infection. RVP ordered.
lipid profile pending

## 2018-01-16 NOTE — PROVIDER CONTACT NOTE (MEDICATION) - SITUATION
Pt's   vitals as per flowsheet
Pt rec'd fluid bolus 1 L of LR. Pt now c/o SOB  vitals as per flowsheet.

## 2018-01-16 NOTE — PROGRESS NOTE ADULT - PROBLEM SELECTOR PLAN 10
with sinus tachycardia. unclear etiology. per radiology CT chest findings can be the the etiology of infection. ID on case- evaluating for antibiotics. RVP neg. blood c/s testing
now with hypotension in addition to fevers. lethargic. d/w ICU and arranging for LP by IR with FFPs and Platelet transfusion. still await response from [facility]  Prognosis grim
LDL 19
Normotensive  Monitor BP
Normotensive  Monitor BP
episode yesterday. now resolved. still on O2 NC. darrell.   await ID to decide about bactrim and steroids for PCP.   likely viral respiratory infection. RVP ordered.
with sinus tachycardia. unclear etiology. per radiology CT chest findings can be the the etiology of infection. ID on case- evaluating for antibiotics. RVP neg. blood c/s testing. today getting MRi of liver. if no source found, will cosnider transferring pt to [facility]

## 2018-01-16 NOTE — PROGRESS NOTE ADULT - SUBJECTIVE AND OBJECTIVE BOX
[facility]  INFECTIOUS DISEASES AND INTERNAL MEDICINE at [place]  =======================================================  Raymon Ortiz MD  Diplomates American Board of Internal Medicine and Infectious Diseases  =======================================================    CLOUD SARA [id number]  Seen with      Follow up: Diarrhea    Febrile  Still has diarrhea    Became hypotensive and tachycardic this morning    Allergies:  No Known Allergies      Antibiotics:  None      REVIEW OF SYSTEMS:  CONSTITUTIONAL:  + Fever + chills  HEENT:  No diplopia or blurred vision.  No earache, sore throat or runny nose.  CARDIOVASCULAR:  No pressure, squeezing, strangling, tightness, heaviness or aching about the chest, neck, axilla or epigastrium.  RESPIRATORY:  No cough, shortness of breath  GASTROINTESTINAL:  + diarrhea.  GENITOURINARY:  No dysuria, frequency or urgency. No Blood in urine  MUSCULOSKELETAL:  no joint aches, no muscle pain  SKIN:  No change in skin, hair or nails.  NEUROLOGIC:  No paresthesias, fasciculations  PSYCHIATRIC:  No disorder of thought or mood.  ENDOCRINE:  No heat or cold intolerance  HEMATOLOGICAL:  No easy bruising or bleeding.      Physical Exam:  Vital Signs Last 24 Hrs  T(C): 36.7 (15 Julian 2018 08:27), Max: 38.9 (14 Jan 2018 16:17)  T(F): 98 (15 Julian 2018 08:27), Max: 102.1 (14 Jan 2018 16:17)  HR: 109 (15 Julian 2018 08:27) (96 - 140)  BP: 99/57 (15 Julian 2018 08:27) (99/57 - 127/71)  BP(mean): 73 (15 Julian 2018 04:00) (73 - 102)  RR: 20 (15 Julian 2018 08:27) (17 - 29)  SpO2: 97% (15 Julian 2018 08:27) (95% - 99%)      GEN: NAD, pleasant  HEENT: normocephalic and atraumatic. EOMI. PERRL.    NECK: Supple.   LUNGS: Clear to auscultation.  HEART: Regular rate and rhythm  ABDOMEN: Soft, Mild tenderness to deep palpation, and nondistended.  Positive bowel sounds.    : No CVA tenderness  EXTREMITIES: Without any edema.  MSK: No joint swelling  NEUROLOGIC: Cranial nerves II through XII are grossly intact.  PSYCHIATRIC: Appropriate affect .  SKIN: No rash      Labs:  01-16    138  |  103  |  36.0<H>  ----------------------------<  52<L>  4.1   |  15.0<L>  |  1.05    Ca    10.0      16 Jan 2018 07:14  Phos  4.4     01-15  Mg     1.6     01-16    TPro  4.6<L>  /  Alb  2.0<L>  /  TBili  2.7<H>  /  DBili  x   /  AST  599<H>  /  ALT  84<H>  /  AlkPhos  293<H>  01-16               8.1    4.4   )-----------( 81       ( 16 Jan 2018 07:14 )             24.7       PT/INR - ( 16 Jan 2018 07:14 )   PT: 19.6 sec;   INR: 1.76 ratio         PTT - ( 16 Jan 2018 07:14 )  PTT:40.5 sec    LIVER FUNCTIONS - ( 16 Jan 2018 07:14 )  Alb: 2.0 g/dL / Pro: 4.6 g/dL / ALK PHOS: 293 U/L / ALT: 84 U/L / AST: 599 U/L / GGT: x             ABG - ( 15 Julian 2018 17:13 )  pH: 7.39  /  pCO2: 27    /  pO2: 101   / HCO3: 18    / Base Excess: -7.7  /  SaO2: 98            RECENT CULTURES:  01-14 @ 16:21 .Stool Feces     Testing in progress        01-14 @ 10:38    [facility]      01-13 @ 22:08 .Blood Blood-Peripheral     No growth at 48 hours      01-13 @ 07:30 .Stool Stool     No Cryptosporidium, Cyclospora sp. or Isospora found by modified acid  fast stain      01-12 @ 22:51 .Stool Feces     Testing in progress      01-12 @ 19:48 .Stool Feces     No Protozoa seen by trichrome stain  No Helminths or Protozoa seen in formalin concentrate  performed by iodine stain  (routine O+P not evaluated for Microsporidia,  Cryptosporidia, Cyclospora, or Isospora.)  Note: One negative specimen does not rule  out the possibility of a parasitic infection.      01-11 @ 08:31 .Stool Feces     Norovirus GI/GII  DETECTED by PCR  *******Please Note:*******  GI panel PCR evaluates for:  Campylobacter, Plesiomonas shigelloides, Salmonella,  Vibrio, Yersinia enterocolitica, Enteroaggregative  Escherichia coli (EAEC), Enteropathogenic E.coli (EPEC),  Enterotoxigenic E. coli (ETEC) lt/st, Shiga-like  toxin-producing E. coli (STEC) stx1/stx2,  Shigella/ Enteroinvasive E. coli (EIEC), Cryptosporidium,  Cyclospora cayetanensis, Entamoeba histolytica,  Giardia lamblia, Adenovirus F 40/41, Astrovirus,  Norovirus GI/GII, Rotavirus A, Sapovirus      01-10 @ 02:43 .Urine Catheterized     No growth      01-10 @ 02:16 .Blood Arterial Catheter     No growth at 5 days.      01-10 @ 01:49 .Blood Blood     No growth at 5 days.      01-09 @ 18:36 .Stool Feces     No Protozoa seen by trichrome stain  No Helminths or Protozoa seen in formalin concentrate  performed by iodine stain  (routine O+P not evaluated for Microsporidia,  Cryptosporidia, Cyclospora, or Isospora.)  Note: One negative specimen does not rule  out the possibility of a parasitic infection.      01-09 @ 11:01 .Stool Feces     No enteric gram negative rods isolated  No enteric pathogens isolated.  (Stool culture examined for Salmonella,  Shigella, Campylobacter, Aeromonas, Plesiomonas,  Vibrio, E.coli O157 and Yersinia)      01-08 @ 19:42 .Blood Blood     No growth at 5 days.      01-08 @ 19:41 .Blood Blood     No growth at 5 days.      01-08 @ 17:48 .Stool Feces     No enteric pathogens isolated.  (Stool culture examined for Salmonella,  Shigella, Campylobacter, Aeromonas, Plesiomonas,  Vibrio, E.coli O157 and Yersinia)      01-02 @ 16:02 .Blood Blood     No growth at 5 days.      01-02 @ 16:01 .Blood Blood     No growth at 5 days.        EXAM:  US ABDOMEN LIMITED                        PROCEDURE DATE:  01/15/2018    INTERPRETATION:  CLINICAL INFORMATION: Right upper quadrant pain.  Fever.   Evaluate for ascending cholangitis.  COMPARISON: MRI abdomen from earlier on 01/15/2018.  Right upper quadrant   abdominal ultrasound from 01/09/2018.  CT abdomen pelvis from 01/08/2018.  TECHNIQUE: Sonography of the right upper quadrant.   FINDINGS:  Liver: Increased liver echogenicity compatible with hepatic steatosis.    1.5 x 1.2 cm peripherally calcified cyst in the right hepatic lobe is   grossly stable in size since CT scan of 01/08/2018.  Bile ducts: Normal caliber. Common bile duct measures 4 mm.   Gallbladder: Surgically absent.  Pancreas: Visualized portions are within normal limits.  Right kidney: 9.5 cm. No hydronephrosis.      Ascites: Trace perihepatic ascites.  IVC: Visualized portions are within normal limits.  IMPRESSION:   Status post cholecystectomy.  No sonographic evidence of biliary ductal   dilatation.  Please note that ascending cholangitis is not a sonographic   diagnosis.  Correlate with clinical symptoms and laboratory values.        EXAM:  MR ABDOMEN IC                        PROCEDURE DATE:  01/15/2018    INTERPRETATION:  [facility] RADIOLOGIST PRELIMINARY REPORT  EXAM:    MR Abdomen Without and With Intravenous Contrast  CLINICAL HISTORY:    67 years old, female; Screening exam; Other: Fever  TECHNIQUE:    Multiplanar magnetic resonance images of the abdomen without and with intravenous contrast.  CONTRAST:    5 mL of gadavist administered intravenously.  COMPARISON:    No relevant prior studies available.  FINDINGS:    Lower thorax:  Small right pleural effusion.  No dense consolidation seen in   the lung bases.    Liver:  Trace perihepatic fluid.  6 mm and 5 mm cysts in the liver. No mass   lesion or fluid collection seen in the liver. 2.0 x 1.0 cm T2 hyperintense   lesion in left lobe of liver. This finding is consistent with a hemangioma and  was better visualized on prior CT.    Gallbladder and bile ducts:  Gallbladder not seen and appears resected.   No fluid in gallbladder fossa.  No ductal dilation.    Pancreas:  Unremarkable.  No ductal dilation.  No mass.    Spleen:  Unremarkable.  No splenomegaly.    Adrenals:  Unremarkable.  No mass.    Kidneys and ureters:  Unremarkable.  No hydronephrosis.  No solid mass.    Stomach and bowel:  Unremarkable.  No obstruction.    Intraperitoneal space:  No significant free fluid in the abdomen.    Soft tissues:  Unremarkable.    Vasculature:  Unremarkable.  No abdominal aortic aneurysm.    Lymph nodes:  Unremarkable.  No enlarged lymph nodes.    Other findings:  No abscess collection.  IMPRESSION:       1.  Small right pleural effusion.  2.  Trace perihepatic fluid.  This finding is nonspecific.      EXAM:  XR CHEST PORTABLE IMMED 1V                        PROCEDURE DATE:  01/15/2018    INTERPRETATION:  Clinical information: Shortness of breath  Portable AP radiograph of the chest was performed.  Comparison: 1/12/2018  The lungs are clear and the heart is normal. The osseous structures are   unremarkable. There are surgical clips in the left side of the neck.  Impression:  No acute pulmonary disease.

## 2018-01-16 NOTE — PROGRESS NOTE ADULT - SUBJECTIVE AND OBJECTIVE BOX
INTERVAL HPI/OVERNIGHT EVENTS:Called for reevaluating for hypotension, abdominal discomfort by MICU team. Patient has been hypotensive overnight. She has been febrile. No more diarrhea. Complaining of abdominal pain. MRCP-preliminary read-nothing remarkable. Placed on broad spectrum antibiotics CD4 count -5     MEDICATIONS  (STANDING):  cefepime  IVPB 2000 milliGRAM(s) IV Intermittent every 24 hours  clotrimazole Lozenge 1 Lozenge Oral five times a day  dextrose 5%. 1000 milliLiter(s) (50 mL/Hr) IV Continuous <Continuous>  dextrose 50% Injectable 12.5 Gram(s) IV Push once  dextrose 50% Injectable 25 Gram(s) IV Push once  dextrose 50% Injectable 25 Gram(s) IV Push once  insulin lispro (HumaLOG) corrective regimen sliding scale   SubCutaneous at bedtime  nystatin Cream 1 Application(s) Topical two times a day  sodium bicarbonate  Infusion 0.323 mEq/kG/Hr (125 mL/Hr) IV Continuous <Continuous>    MEDICATIONS  (PRN):  aluminum hydroxide/magnesium hydroxide/simethicone Suspension 30 milliLiter(s) Oral every 4 hours PRN Dyspepsia  dextrose Gel 1 Dose(s) Oral once PRN Blood Glucose LESS THAN 70 milliGRAM(s)/deciliter  dextrose Gel 1 Dose(s) Oral once PRN Blood Glucose LESS THAN 70 milliGRAM(s)/deciliter  glucagon  Injectable 1 milliGRAM(s) IntraMuscular once PRN Glucose LESS THAN 70 milligrams/deciliter  morphine  - Injectable 2 milliGRAM(s) IV Push every 6 hours PRN Moderate Pain (4 - 6)      Allergies    No Known Allergies    Intolerances        Vital Signs Last 24 Hrs  T(C): 37.6 (2018 08:19), Max: 39.3 (15 Julian 2018 15:13)  T(F): 99.6 (2018 08:19), Max: 102.8 (15 Julian 2018 15:13)  HR: 131 (2018 09:16) (114 - 159)  BP: 81/55 (2018 11:01) (68/47 - 143/69)  BP(mean): 63 (2018 11:01) (59 - 83)  RR: 22 (2018 11:01) (18 - 33)  SpO2: 96% (2018 11:01) (93% - 100%)    LABS:                        8.1    4.4   )-----------( 81       ( 2018 07:14 )             24.7     -    138  |  103  |  36.0<H>  ----------------------------<  52<L>  4.1   |  15.0<L>  |  1.05    Ca    10.0      2018 07:14  Phos  4.4     -15  Mg     1.6         TPro  4.6<L>  /  Alb  2.0<L>  /  TBili  2.7<H>  /  DBili  x   /  AST  599<H>  /  ALT  84<H>  /  AlkPhos  293<H>      PT/INR - ( 2018 07:14 )   PT: 19.6 sec;   INR: 1.76 ratio         PTT - ( 2018 07:14 )  PTT:40.5 sec  Urinalysis Basic - ( 2018 11:33 )    Color: Yellow / Appearance: Clear / S.015 / pH: x  Gluc: x / Ketone: Trace  / Bili: Moderate / Urobili: 1 mg/dL   Blood: x / Protein: 30 mg/dL / Nitrite: Negative   Leuk Esterase: Trace / RBC: x / WBC x   Sq Epi: x / Non Sq Epi: x / Bacteria: x        RADIOLOGY & ADDITIONAL TESTS:

## 2018-01-16 NOTE — CONSULT NOTE ADULT - SUBJECTIVE AND OBJECTIVE BOX
Pt is a 67 YOF h/o DM, HTN, MI, REGI who was admitted after presenting with fever, diarrhea and oral thrush.  She tested positive for HIV this admission and was found to have a CD4 count of 5.  Stool was checked and cultured positive for Norovirus.  Today was recalled to evaluate this patient for ICU due to hypotension, tachycardia and hypoglycemia.  Pt was receiving a 1L IVF bolus and had received 50cc D50.  Pt was moved to ICU as she may require intubation and pressors.  Spoke with ID who was still awaiting CD4 count and will modify ABx now that CD4 resulted as 5, will need PCP prophylaxis, new blood cultures, UA and CXR done this am.  Pt's family updated with , 2 daughters at bedside, pt's surrogate/daughter Lala Carrillo was updated via phone as well.  Pt is critically ill and now meeting criteria for AIDS and is in shock which has thus far responded to fluids.  REASON FOR CONSULT: hypotension    CONSULT REQUESTED BY: Alejandro    Patient is a 67y old  Female who presents with a chief complaint of abdominal pain fever and diarrhea (2018 05:07)      BRIEF HOSPITAL COURSE: as above    Events last 24 hours: hypotension, hypoglycemia, pancytopenia    PAST MEDICAL & SURGICAL HISTORY:  Anemia  Sleep apnea  Myocardial infarction  GERD (gastroesophageal reflux disease)  Dyslipidemia  Hypertension  Diabetes mellitus  H/O right knee surgery:   S/P cataract extraction: B/L  S/P partial thyroidectomy  S/P tubal ligation: s/p cyst removal of left axilla  S/P cholecystectomy    Allergies    No Known Allergies    Intolerances      FAMILY HISTORY:  No pertinent family history in first degree relatives      Review of Systems:  CONSTITUTIONAL: No fever, chills, or fatigue  EYES: No eye pain, visual disturbances, or discharge  ENMT:  No difficulty hearing, tinnitus, vertigo; No sinus or throat pain  NECK: No pain or stiffness  RESPIRATORY: c/o mild shortness of breath  CARDIOVASCULAR: No chest pain, palpitations, dizziness, or leg swelling  GASTROINTESTINAL: c/o abdominal pain, diarrhea  GENITOURINARY: No dysuria, frequency, hematuria, or incontinence  NEUROLOGICAL: No headaches, memory loss, loss of strength, numbness, or tremors  SKIN: No itching, burning, rashes, or lesions   MUSCULOSKELETAL: No joint pain or swelling; No muscle, back, or extremity pain  PSYCHIATRIC: No depression, anxiety, mood swings, or difficulty sleeping      Medications:  cefepime  IVPB 2000 milliGRAM(s) IV Intermittent every 24 hours  clotrimazole Lozenge 1 Lozenge Oral five times a day              aluminum hydroxide/magnesium hydroxide/simethicone Suspension 30 milliLiter(s) Oral every 4 hours PRN      dextrose 50% Injectable 12.5 Gram(s) IV Push once  dextrose 50% Injectable 25 Gram(s) IV Push once  dextrose 50% Injectable 25 Gram(s) IV Push once  dextrose Gel 1 Dose(s) Oral once PRN  dextrose Gel 1 Dose(s) Oral once PRN  glucagon  Injectable 1 milliGRAM(s) IntraMuscular once PRN    dextrose 5%. 1000 milliLiter(s) IV Continuous <Continuous>  sodium bicarbonate  Infusion 0.323 mEq/kG/Hr IV Continuous <Continuous>      nystatin Cream 1 Application(s) Topical two times a day            ICU Vital Signs Last 24 Hrs  T(C): 36.6 (2018 12:16), Max: 39.3 (15 Julian 2018 15:13)  T(F): 97.8 (2018 12:16), Max: 102.8 (15 Julian 2018 15:13)  HR: 132 (2018 13:00) (114 - 159)  BP: 130/57 (2018 13:00) (68/47 - 137/80)  BP(mean): 66 (2018 12:45) (59 - 83)  ABP: --  ABP(mean): --  RR: 24 (2018 13:00) (18 - 33)  SpO2: 98% (2018 13:00) (92% - 100%)    Vital Signs Last 24 Hrs  T(C): 36.6 (2018 12:16), Max: 39.3 (15 Julian 2018 15:13)  T(F): 97.8 (2018 12:16), Max: 102.8 (15 Julian 2018 15:13)  HR: 132 (2018 13:00) (114 - 159)  BP: 130/57 (2018 13:00) (68/47 - 137/80)  BP(mean): 66 (2018 12:45) (59 - 83)  RR: 24 (2018 13:00) (18 - 33)  SpO2: 98% (2018 13:00) (92% - 100%)    ABG - ( 2018 14:29 )  pH: 7.30  /  pCO2: 39    /  pO2: 68    / HCO3: 19    / Base Excess: -6.6  /  SaO2: 92                  I&O's Detail    15 Julian 2018 07:01  -  2018 07:00  --------------------------------------------------------  IN:    Lactated Ringers IV Bolus: 1000 mL    Oral Fluid: 360 mL    sodium chloride 0.9%: 600 mL  Total IN: 1960 mL    OUT:    Voided: 775 mL  Total OUT: 775 mL    Total NET: 1185 mL            LABS:                        8.1    4.4   )-----------( 81       ( 2018 07:14 )             24.7     -    138  |  103  |  36.0<H>  ----------------------------<  52<L>  4.1   |  15.0<L>  |  1.05    Ca    10.0      2018 07:14  Phos  4.4     -15  Mg     1.6         TPro  4.6<L>  /  Alb  2.0<L>  /  TBili  2.7<H>  /  DBili  x   /  AST  599<H>  /  ALT  84<H>  /  AlkPhos  293<H>            CAPILLARY BLOOD GLUCOSE      POCT Blood Glucose.: 92 mg/dL (2018 12:36)    PT/INR - ( 2018 07:14 )   PT: 19.6 sec;   INR: 1.76 ratio         PTT - ( 2018 07:14 )  PTT:40.5 sec  Urinalysis Basic - ( 2018 11:33 )    Color: Yellow / Appearance: Clear / S.015 / pH: x  Gluc: x / Ketone: Trace  / Bili: Moderate / Urobili: 1 mg/dL   Blood: x / Protein: 30 mg/dL / Nitrite: Negative   Leuk Esterase: Trace / RBC: 0-2 /HPF / WBC 10-15 /HPF   Sq Epi: x / Non Sq Epi: Moderate / Bacteria: Few      CULTURES:  Culture Results:   Testing in progress ( @ 16:21)  Culture Results:   No growth at 48 hours ( @ 22:08)  Culture Results:   No growth at 48 hours ( @ 22:08)  Culture Results:   No Cryptosporidium, Cyclospora sp. or Isospora found by modified acid  fast stain ( @ 07:30)  Culture Results:   No Microsporidia species found  by modified trichrome stain ( @ 07:30)  Culture Results:   No Protozoa seen by trichrome stain  No Helminths or Protozoa seen in formalin concentrate  performed by iodine stain  (routine O+P not evaluated for Microsporidia,  Cryptosporidia, Cyclospora, or Isospora.)  Note: One negative specimen does not rule  out the possibility of a parasitic infection. ( @ 22:51)  Culture Results:   No Protozoa seen by trichrome stain  No Helminths or Protozoa seen in formalin concentrate  performed by iodine stain  (routine O+P not evaluated for Microsporidia,  Cryptosporidia, Cyclospora, or Isospora.)  Note: One negative specimen does not rule  out the possibility of a parasitic infection. ( @ 19:48)  Culture Results:   Norovirus GI/GII  DETECTED by PCR  *******Please Note:*******  GI panel PCR evaluates for:  Campylobacter, Plesiomonas shigelloides, Salmonella,  Vibrio, Yersinia enterocolitica, Enteroaggregative  Escherichia coli (EAEC), Enteropathogenic E.coli (EPEC),  Enterotoxigenic E. coli (ETEC) lt/st, Shiga-like  toxin-producing E. coli (STEC) stx1/stx2,  Shigella/ Enteroinvasive E. coli (EIEC), Cryptosporidium,  Cyclospora cayetanensis, Entamoeba histolytica,  Giardia lamblia, Adenovirus F 40/41, Astrovirus,  Norovirus GI/GII, Rotavirus A, Sapovirus ( @ 08:31)  Culture Results:   No growth (01-10 @ 02:43)  Culture Results:   No growth at 5 days. (01-10 @ 02:16)  Culture Results:   No growth at 5 days. (01-10 @ 01:49)  Culture Results:   No Protozoa seen by trichrome stain  No Helminths or Protozoa seen in formalin concentrate  performed by iodine stain  (routine O+P not evaluated for Microsporidia,  Cryptosporidia, Cyclospora, or Isospora.)  Note: One negative specimen does not rule  out the possibility of a parasitic infection. ( @ 18:36)  Culture Results:   Giardia antigen negative performed by rapid immunoassay (non-enzymatic).  (It is recommended that all specimens tested by  an immunochromatographic card test be  confirmed by another method.) ( @ 18:36)  Culture Results:   No Protozoa seen by trichrome stain  No Helminths or Protozoa seen in formalin concentrate  performed by iodine stain  (routine O+P not evaluated for Microsporidia,  Cryptosporidia, Cyclospora, or Isospora.)  Note: One negative specimen does not rule  out the possibility of a parasitic infection. ( @ 18:36)      Physical Examination:    General: Awake, alert, oriented, follows commands    HEENT: Pupils equal, reactive to light.  Symmetric.    PULM: Course LS bilaterally, no significant sputum production    CVS: Tachycardic, no murmurs, rubs, or gallops    ABD: Soft, mildly distended, difusely tender, normoactive bowel sounds, no masses    EXT: No edema, nontender    SKIN: Warm and well perfused, no rashes noted.    RADIOLOGY: images reviewed    CRITICAL CARE TIME SPENT: 75 min

## 2018-01-16 NOTE — CONSULT NOTE ADULT - PROBLEM SELECTOR RECOMMENDATION 6
Continue oxygen as needed to maintain 02 sat >92%  currently requiring NC 02 at 5L  if worsening hypoxia may require intubation

## 2018-01-16 NOTE — CHART NOTE - NSCHARTNOTEFT_GEN_A_CORE
Source: Patient [ ]  Family [ ]   other [X]: EMR    Current Diet: Consistent CHO w/ evening snack, No-Carb Prosource, Glucerna BID    Patient reports [X] nausea  [X] vomiting [ ] diarrhea [ ] constipation  [ ]chewing problems [ ] swallowing issues  [ ] other:     PO intake:  < 50% [ ]   50-75%  [X]   %  [ ]  other :    Source for PO intake [ ] Patient [ ] family [ ] chart [ ] staff [ ] other    Enteral /Parenteral Nutrition:     Current Weight: 136.2lbs (1-16-18)    % Weight Change     Pertinent Medications: MEDICATIONS  (STANDING):  ascorbic acid Injectable 1500 milliGRAM(s) IV Push two times a day  azithromycin   Tablet 600 milliGRAM(s) Oral daily  cefepime  IVPB 2000 milliGRAM(s) IV Intermittent every 24 hours  clotrimazole Lozenge 1 Lozenge Oral five times a day  dextrose 5%. 1000 milliLiter(s) (50 mL/Hr) IV Continuous <Continuous>  dextrose 50% Injectable 12.5 Gram(s) IV Push once  dextrose 50% Injectable 25 Gram(s) IV Push once  dextrose 50% Injectable 25 Gram(s) IV Push once  ethambutol 800 milliGRAM(s) Oral every other day  hydrocortisone sodium succinate Injectable 50 milliGRAM(s) IV Push every 6 hours  metroNIDAZOLE  IVPB      metroNIDAZOLE  IVPB 500 milliGRAM(s) IV Intermittent once  metroNIDAZOLE  IVPB 500 milliGRAM(s) IV Intermittent every 8 hours  nystatin Cream 1 Application(s) Topical two times a day  rifabutin 150 milliGRAM(s) Oral daily  sodium bicarbonate  Infusion 0.323 mEq/kG/Hr (125 mL/Hr) IV Continuous <Continuous>  thiamine IVPB 200 milliGRAM(s) IV Intermittent two times a day  trimethoprim  160 mG/sulfamethoxazole 800 mG 2 Tablet(s) Oral every 8 hours    MEDICATIONS  (PRN):  aluminum hydroxide/magnesium hydroxide/simethicone Suspension 30 milliLiter(s) Oral every 4 hours PRN Dyspepsia  dextrose Gel 1 Dose(s) Oral once PRN Blood Glucose LESS THAN 70 milliGRAM(s)/deciliter  dextrose Gel 1 Dose(s) Oral once PRN Blood Glucose LESS THAN 70 milliGRAM(s)/deciliter  glucagon  Injectable 1 milliGRAM(s) IntraMuscular once PRN Glucose LESS THAN 70 milligrams/deciliter    Pertinent Labs: CBC Full  -  ( 16 Jan 2018 07:14 )  WBC Count : 4.4 K/uL  Hemoglobin : 8.1 g/dL  Hematocrit : 24.7 %  Platelet Count - Automated : 81 K/uL  Mean Cell Volume : 79.4 fl  Mean Cell Hemoglobin : 26.0 pg  Mean Cell Hemoglobin Concentration : 32.8 g/dL  Auto Neutrophil # : x  Auto Lymphocyte # : x  Auto Monocyte # : x  Auto Eosinophil # : x  Auto Basophil # : x  Auto Neutrophil % : x  Auto Lymphocyte % : x  Auto Monocyte % : x  Auto Eosinophil % : x  Auto Basophil % : x    Skin: Intact     Nutrition focused physical exam conducted - found signs of malnutrition [X]absent [ ]present    Subcutaneous fat loss: [ ] Orbital fat pads region, [ ]Buccal fat region, [ ]Triceps region,  [ ]Ribs region    Muscle wasting: [ ]Temples region, [ ]Clavicle region, [ ]Shoulder region, [ ]Scapula region, [ ]Interosseous region,  [ ]thigh region, [ ]Calf region    Estimated Needs:   [X] No change  [ ] recalculated:     Current Nutrition Diagnosis:    Recommendations: Acute Moderate malnutrition related to dehydration dx, increased energy needs in setting of HIV dx and bouts of vomiting, diarrhea  as evidenced by 22lb (~15%) wt loss x 2 weeks, poor PO intake x2 days, nausea/vomiting x 3 days PTA.       Monitoring and Evaluation:   [X] PO intake [X] Tolerance to diet prescription [X] Weights  [X] Follow up per protocol [X] Labs:

## 2018-01-16 NOTE — PROGRESS NOTE ADULT - PROBLEM SELECTOR PROBLEM 9
Acute respiratory failure with hypoxia
Fever
Acute respiratory failure with hypoxia
Dyslipidemia
Essential hypertension
Essential hypertension
Type 2 diabetes mellitus without complication, without long-term current use of insulin
Type 2 diabetes mellitus without complication, without long-term current use of insulin

## 2018-01-16 NOTE — PROVIDER CONTACT NOTE (MEDICATION) - ACTION/TREATMENT ORDERED:
No new orders at this time. Reassess and call back provider in one hour
No new orders at this time, Dr. Núñez states " I am aware".

## 2018-01-16 NOTE — PROGRESS NOTE ADULT - PROBLEM SELECTOR PLAN 2
No active Echinococcal infection. Pt. HIV + and now febrile, hypotensive and tachycardic, management as per ID. Antibiotics held as per ID.

## 2018-01-16 NOTE — CHART NOTE - NSCHARTNOTEFT_GEN_A_CORE
Case was discussed with primary team at beginning of shift, I was advised to not start Abx or be concerned with patient sinus tachycardia. As per ID consult Hold off on antibiotics since patient has been on Ertapenem last admission. As per GI: NO role of any particular antibiotics at this time.   Pt received 1 L IV fluids at 7pm after which she developed respiratory distress, increased chest congestion both clinically and on CXR, patient has intermittent hypotension. All Fluids was held, patient was repositioned in bed and case discussed with family at bedside via . On NC, RR 25, Sao2 98% -131, BP improved to SBP~102.  ID/GI consult reviewed. Blood cultures pending from today already. Cooling measures implemented for fever, Ibuprofen 800mg every 6hrs for Temp >100.4 cont. Will hold off continuation of fluids at this time. Will cont to follow overnight.

## 2018-01-16 NOTE — PROGRESS NOTE ADULT - PROBLEM SELECTOR PLAN 1
Noted to be hypotensive and tachycardic this AM  Febrile  Pancytopenia   On fluid bolus  Will add Cefepime pending cultures  Repeat blood cultures  CXR with no pneumonia  CT Chest with no pneumonia  no PE  UA negative for UTI  U/S Abd with no acute findings  MRI liver with no acute findings

## 2018-01-16 NOTE — CONSULT NOTE ADULT - ASSESSMENT
67 YOF with newly diagnosed HIV/AIDS, Diarrhea likely due to Norovirus, 67 YOF with newly diagnosed HIV/AIDS, Diarrhea likely due to Norovirus, severe sepsis and septic shock in immunocompromised host, hypoglycemia, pancytopenia

## 2018-01-16 NOTE — PROGRESS NOTE ADULT - SUBJECTIVE AND OBJECTIVE BOX
HEALTH ISSUES - PROBLEM Dx:        INTERVAL HPI/OVERNIGHT EVENTS:              Vital Signs Last 24 Hrs  T(C): 36.6 (2018 12:16), Max: 39.3 (15 Julian 2018 15:13)  T(F): 97.8 (2018 12:16), Max: 102.8 (15 Julian 2018 15:13)  HR: 132 (2018 13:00) (114 - 159)  BP: 130/57 (2018 13:00) (68/47 - 143/69)  BP(mean): 66 (2018 12:45) (59 - 83)  RR: 24 (2018 13:00) (18 - 33)  SpO2: 98% (2018 13:00) (92% - 100%)    PHYSICAL EXAM-  GENERAL: NAD, well-groomed, well-developed  HEAD:  Atraumatic, Normocephalic  EYES: EOMI, PERRLA, conjunctiva and sclera clear  ENMT: No tonsillar erythema, exudates, or enlargement; Moist mucous membranes, Good dentition, No lesions  NECK: Supple, No JVD, Normal thyroid  NERVOUS SYSTEM:  Alert & Oriented X3, Motor Strength 5/5 B/L upper and lower extremities; DTRs 2+ intact and symmetric  CHEST/LUNG: Clear to percussion bilaterally; No rales, rhonchi, wheezing, or rubs  HEART: Regular rate and rhythm; No murmurs, rubs, or gallops  ABDOMEN: Soft, Nontender, Nondistended; Bowel sounds present  EXTREMITIES:  2+ Peripheral Pulses, No clubbing, cyanosis, or edema  LYMPH: No lymphadenopathy noted  SKIN: No rashes or lesions    LABS:                        8.1    4.4   )-----------( 81       ( 2018 07:14 )             24.7     01-16    138  |  103  |  36.0<H>  ----------------------------<  52<L>  4.1   |  15.0<L>  |  1.05    Ca    10.0      2018 07:14  Phos  4.4     01-15  Mg     1.6     01-16    TPro  4.6<L>  /  Alb  2.0<L>  /  TBili  2.7<H>  /  DBili  x   /  AST  599<H>  /  ALT  84<H>  /  AlkPhos  293<H>  -16    PT/INR - ( 2018 07:14 )   PT: 19.6 sec;   INR: 1.76 ratio         PTT - ( 2018 07:14 )  PTT:40.5 sec  Urinalysis Basic - ( 2018 11:33 )    Color: Yellow / Appearance: Clear / S.015 / pH: x  Gluc: x / Ketone: Trace  / Bili: Moderate / Urobili: 1 mg/dL   Blood: x / Protein: 30 mg/dL / Nitrite: Negative   Leuk Esterase: Trace / RBC: 0-2 /HPF / WBC 10-15 /HPF   Sq Epi: x / Non Sq Epi: Moderate / Bacteria: Few          Assessment and Plan    Encephalopathy  Malnutrition  Morbid Obesity  Functional quadriplegia    DVT Prophylaxis    Discussed with: Patient, family, RN, CM, Consultants  Plan of care/ Discharge planning discussed.    Visit Time:

## 2018-01-16 NOTE — PROGRESS NOTE ADULT - SUBJECTIVE AND OBJECTIVE BOX
Pt seen and examined. Hypotensive, tachycardic and febrile this AM. BP 68/47. Pt. is lucid however     REVIEW OF SYSTEMS:  Constitutional: As above  Gastrointestinal: No GI complaints presently offered.      MEDICATIONS:  MEDICATIONS  (STANDING):  clotrimazole Lozenge 1 Lozenge Oral five times a day  dextrose 5%. 1000 milliLiter(s) (50 mL/Hr) IV Continuous <Continuous>  dextrose 50% Injectable 12.5 Gram(s) IV Push once  dextrose 50% Injectable 25 Gram(s) IV Push once  dextrose 50% Injectable 25 Gram(s) IV Push once  insulin lispro (HumaLOG) corrective regimen sliding scale   SubCutaneous three times a day before meals  insulin lispro (HumaLOG) corrective regimen sliding scale   SubCutaneous at bedtime  nystatin Cream 1 Application(s) Topical two times a day    MEDICATIONS  (PRN):  aluminum hydroxide/magnesium hydroxide/simethicone Suspension 30 milliLiter(s) Oral every 4 hours PRN Dyspepsia  dextrose Gel 1 Dose(s) Oral once PRN Blood Glucose LESS THAN 70 milliGRAM(s)/deciliter  dextrose Gel 1 Dose(s) Oral once PRN Blood Glucose LESS THAN 70 milliGRAM(s)/deciliter  glucagon  Injectable 1 milliGRAM(s) IntraMuscular once PRN Glucose LESS THAN 70 milligrams/deciliter  guaiFENesin  milliGRAM(s) Oral every 12 hours PRN Cough  ibuprofen  Tablet 800 milliGRAM(s) Oral every 6 hours PRN Tmax 100.4  metoprolol    tartrate Injectable 5 milliGRAM(s) IV Push every 6 hours PRN see below  morphine  - Injectable 2 milliGRAM(s) IV Push every 6 hours PRN Moderate Pain (4 - 6)      Allergies    No Known Allergies    Intolerances        Vital Signs Last 24 Hrs  T(C): 38.8 (16 Jan 2018 05:09), Max: 39.3 (15 Julian 2018 15:13)  T(F): 101.8 (16 Jan 2018 05:09), Max: 102.8 (15 Julian 2018 15:13)  HR: 128 (16 Jan 2018 08:14) (107 - 159)  BP: 91/80 (16 Jan 2018 08:14) (79/50 - 143/69)  BP(mean): 59 (16 Jan 2018 04:00) (59 - 83)  RR: 18 (16 Jan 2018 07:49) (18 - 33)  SpO2: 98% (16 Jan 2018 07:49) (93% - 100%)    01-15 @ 07:01  -  01-16 @ 07:00  --------------------------------------------------------  IN: 1960 mL / OUT: 775 mL / NET: 1185 mL        PHYSICAL EXAM:    General: Well developed; well nourished; in no acute distress  HEENT: MMM, conjunctiva pink and sclera anicteric.  Lungs: clear bilaterally.  Cor: Regular rhythm, rate roughly 120.  Gastrointestinal: Abdomen: Soft non-tender non-distended; Normal bowel sounds; No hepatosplenomegaly  Neuro: Pt. a + o x 3    LABS:  ABG - ( 15 Julian 2018 17:13 )  pH: 7.39  /  pCO2: 27    /  pO2: 101   / HCO3: 18    / Base Excess: -7.7  /  SaO2: 98                  CBC Full  -  ( 16 Jan 2018 07:14 )  WBC Count : 4.4 K/uL  Hemoglobin : 8.1 g/dL  Hematocrit : 24.7 %  Platelet Count - Automated : x  Mean Cell Volume : 79.4 fl  Mean Cell Hemoglobin : 26.0 pg  Mean Cell Hemoglobin Concentration : 32.8 g/dL  Auto Neutrophil # : x  Auto Lymphocyte # : x  Auto Monocyte # : x  Auto Eosinophil # : x  Auto Basophil # : x  Auto Neutrophil % : x  Auto Lymphocyte % : x  Auto Monocyte % : x  Auto Eosinophil % : x  Auto Basophil % : x    01-16    138  |  103  |  36.0<H>  ----------------------------<  52<L>  4.1   |  15.0<L>  |  1.05    Ca    10.0      16 Jan 2018 07:14  Phos  4.4     01-15  Mg     1.6     01-16    TPro  4.6<L>  /  Alb  2.0<L>  /  TBili  2.7<H>  /  DBili  x   /  AST  599<H>  /  ALT  84<H>  /  AlkPhos  293<H>  01-16    PT/INR - ( 16 Jan 2018 07:14 )   PT: 19.6 sec;   INR: 1.76 ratio         PTT - ( 16 Jan 2018 07:14 )  PTT:40.5 sec                  RADIOLOGY & ADDITIONAL STUDIES (The following images were personally reviewed):

## 2018-01-16 NOTE — PROGRESS NOTE ADULT - PROBLEM SELECTOR PROBLEM 10
Fever
Sepsis
Acute respiratory failure with hypoxia
Dyslipidemia
Essential hypertension
Essential hypertension
Fever

## 2018-01-17 ENCOUNTER — RESULT REVIEW (OUTPATIENT)
Age: 68
End: 2018-01-17

## 2018-01-17 DIAGNOSIS — Z51.5 ENCOUNTER FOR PALLIATIVE CARE: ICD-10-CM

## 2018-01-17 DIAGNOSIS — R57.9 SHOCK, UNSPECIFIED: ICD-10-CM

## 2018-01-17 DIAGNOSIS — D64.9 ANEMIA, UNSPECIFIED: ICD-10-CM

## 2018-01-17 LAB
-  COAGULASE NEGATIVE STAPHYLOCOCCUS: SIGNIFICANT CHANGE UP
4/8 RATIO: 0.05 RATIO — LOW (ref 0.9–3.6)
ABS CD8: 103 /UL — LOW (ref 136–757)
ALBUMIN SERPL ELPH-MCNC: 1.4 G/DL — LOW (ref 3.3–5.2)
ALBUMIN SERPL ELPH-MCNC: 1.7 G/DL — LOW (ref 3.3–5.2)
ALP SERPL-CCNC: 356 U/L — HIGH (ref 40–120)
ALP SERPL-CCNC: 569 U/L — HIGH (ref 40–120)
ALT FLD-CCNC: 161 U/L — HIGH
ALT FLD-CCNC: 231 U/L — HIGH
ANION GAP SERPL CALC-SCNC: 22 MMOL/L — HIGH (ref 5–17)
ANION GAP SERPL CALC-SCNC: 26 MMOL/L — HIGH (ref 5–17)
APTT BLD: 47.3 SEC — HIGH (ref 27.5–37.4)
AST SERPL-CCNC: 1654 U/L — HIGH
AST SERPL-CCNC: 2599 U/L — HIGH
BILIRUB SERPL-MCNC: 2.4 MG/DL — HIGH (ref 0.4–2)
BILIRUB SERPL-MCNC: 2.5 MG/DL — HIGH (ref 0.4–2)
BUN SERPL-MCNC: 45 MG/DL — HIGH (ref 8–20)
BUN SERPL-MCNC: 48 MG/DL — HIGH (ref 8–20)
CALCIUM SERPL-MCNC: 7.7 MG/DL — LOW (ref 8.6–10.2)
CALCIUM SERPL-MCNC: 8.6 MG/DL — SIGNIFICANT CHANGE UP (ref 8.6–10.2)
CD16+CD56+ CELLS NFR BLD: 16 % — SIGNIFICANT CHANGE UP (ref 4–25)
CD16+CD56+ CELLS NFR SPEC: 40 /UL — LOW (ref 64–494)
CD19 BLASTS SPEC-ACNC: 38 % — HIGH (ref 5–22)
CD19 BLASTS SPEC-ACNC: 96 /UL — SIGNIFICANT CHANGE UP (ref 68–528)
CD3 BLASTS SPEC-ACNC: 110 /UL — LOW (ref 799–2171)
CD3 BLASTS SPEC-ACNC: 44 % — LOW (ref 59–85)
CD4 %: 2 % — LOW (ref 36–65)
CD8 %: 42 % — HIGH (ref 11–36)
CHLORIDE SERPL-SCNC: 92 MMOL/L — LOW (ref 98–107)
CHLORIDE SERPL-SCNC: 95 MMOL/L — LOW (ref 98–107)
CO2 SERPL-SCNC: 20 MMOL/L — LOW (ref 22–29)
CO2 SERPL-SCNC: 20 MMOL/L — LOW (ref 22–29)
CREAT SERPL-MCNC: 1.26 MG/DL — SIGNIFICANT CHANGE UP (ref 0.5–1.3)
CREAT SERPL-MCNC: 1.26 MG/DL — SIGNIFICANT CHANGE UP (ref 0.5–1.3)
CULTURE RESULTS: SIGNIFICANT CHANGE UP
FIBRINOGEN PPP-MCNC: 245 MG/DL — LOW (ref 310–510)
GAS PNL BLDA: SIGNIFICANT CHANGE UP
GLUCOSE BLDC GLUCOMTR-MCNC: 72 MG/DL — SIGNIFICANT CHANGE UP (ref 70–99)
GLUCOSE BLDC GLUCOMTR-MCNC: 84 MG/DL — SIGNIFICANT CHANGE UP (ref 70–99)
GLUCOSE SERPL-MCNC: 215 MG/DL — HIGH (ref 70–115)
GLUCOSE SERPL-MCNC: 86 MG/DL — SIGNIFICANT CHANGE UP (ref 70–115)
GRAM STN FLD: SIGNIFICANT CHANGE UP
HCT VFR BLD CALC: 17.3 % — CRITICAL LOW (ref 37–47)
HCT VFR BLD CALC: 19.6 % — CRITICAL LOW (ref 37–47)
HCT VFR BLD CALC: 20.6 % — CRITICAL LOW (ref 37–47)
HCT VFR BLD CALC: 20.7 % — CRITICAL LOW (ref 37–47)
HCT VFR BLD CALC: 25.9 % — LOW (ref 37–47)
HGB BLD-MCNC: 5.5 G/DL — CRITICAL LOW (ref 12–16)
HGB BLD-MCNC: 6.2 G/DL — CRITICAL LOW (ref 12–16)
HGB BLD-MCNC: 6.6 G/DL — CRITICAL LOW (ref 12–16)
HGB BLD-MCNC: 6.6 G/DL — CRITICAL LOW (ref 12–16)
HGB BLD-MCNC: 8.2 G/DL — LOW (ref 12–16)
INR BLD: 3.47 RATIO — HIGH (ref 0.88–1.16)
LACTATE SERPL-SCNC: 13.9 MMOL/L — CRITICAL HIGH (ref 0.5–2)
MAGNESIUM SERPL-MCNC: 1.8 MG/DL — SIGNIFICANT CHANGE UP (ref 1.6–2.6)
MAGNESIUM SERPL-MCNC: 2.2 MG/DL — SIGNIFICANT CHANGE UP (ref 1.6–2.6)
MCHC RBC-ENTMCNC: 26.6 PG — LOW (ref 27–31)
MCHC RBC-ENTMCNC: 26.6 PG — LOW (ref 27–31)
MCHC RBC-ENTMCNC: 26.7 PG — LOW (ref 27–31)
MCHC RBC-ENTMCNC: 26.7 PG — LOW (ref 27–31)
MCHC RBC-ENTMCNC: 26.8 PG — LOW (ref 27–31)
MCHC RBC-ENTMCNC: 31.6 G/DL — LOW (ref 32–36)
MCHC RBC-ENTMCNC: 31.7 G/DL — LOW (ref 32–36)
MCHC RBC-ENTMCNC: 31.8 G/DL — LOW (ref 32–36)
MCHC RBC-ENTMCNC: 31.9 G/DL — LOW (ref 32–36)
MCHC RBC-ENTMCNC: 32 G/DL — SIGNIFICANT CHANGE UP (ref 32–36)
MCV RBC AUTO: 83.4 FL — SIGNIFICANT CHANGE UP (ref 81–99)
MCV RBC AUTO: 83.5 FL — SIGNIFICANT CHANGE UP (ref 81–99)
MCV RBC AUTO: 83.6 FL — SIGNIFICANT CHANGE UP (ref 81–99)
MCV RBC AUTO: 84.4 FL — SIGNIFICANT CHANGE UP (ref 81–99)
MCV RBC AUTO: 84.8 FL — SIGNIFICANT CHANGE UP (ref 81–99)
METHOD TYPE: SIGNIFICANT CHANGE UP
PHOSPHATE SERPL-MCNC: 6 MG/DL — HIGH (ref 2.4–4.7)
PHOSPHATE SERPL-MCNC: 7.2 MG/DL — HIGH (ref 2.4–4.7)
PLATELET # BLD AUTO: 122 K/UL — LOW (ref 150–400)
PLATELET # BLD AUTO: 195 K/UL — SIGNIFICANT CHANGE UP (ref 150–400)
PLATELET # BLD AUTO: 197 K/UL — SIGNIFICANT CHANGE UP (ref 150–400)
PLATELET # BLD AUTO: 218 K/UL — SIGNIFICANT CHANGE UP (ref 150–400)
PLATELET # BLD AUTO: 254 K/UL — SIGNIFICANT CHANGE UP (ref 150–400)
POTASSIUM SERPL-MCNC: 4.8 MMOL/L — SIGNIFICANT CHANGE UP (ref 3.5–5.3)
POTASSIUM SERPL-MCNC: 5.3 MMOL/L — SIGNIFICANT CHANGE UP (ref 3.5–5.3)
POTASSIUM SERPL-SCNC: 4.8 MMOL/L — SIGNIFICANT CHANGE UP (ref 3.5–5.3)
POTASSIUM SERPL-SCNC: 5.3 MMOL/L — SIGNIFICANT CHANGE UP (ref 3.5–5.3)
PROT SERPL-MCNC: 3.6 G/DL — LOW (ref 6.6–8.7)
PROT SERPL-MCNC: 3.9 G/DL — LOW (ref 6.6–8.7)
PROTHROM AB SERPL-ACNC: 39.2 SEC — HIGH (ref 9.8–12.7)
RBC # BLD: 2.07 M/UL — LOW (ref 4.4–5.2)
RBC # BLD: 2.31 M/UL — LOW (ref 4.4–5.2)
RBC # BLD: 2.47 M/UL — LOW (ref 4.4–5.2)
RBC # BLD: 2.48 M/UL — LOW (ref 4.4–5.2)
RBC # BLD: 3.07 M/UL — LOW (ref 4.4–5.2)
RBC # FLD: 17 % — HIGH (ref 11–15.6)
RBC # FLD: 17 % — HIGH (ref 11–15.6)
RBC # FLD: 17.2 % — HIGH (ref 11–15.6)
RBC # FLD: 17.3 % — HIGH (ref 11–15.6)
RBC # FLD: 17.7 % — HIGH (ref 11–15.6)
SODIUM SERPL-SCNC: 137 MMOL/L — SIGNIFICANT CHANGE UP (ref 135–145)
SODIUM SERPL-SCNC: 138 MMOL/L — SIGNIFICANT CHANGE UP (ref 135–145)
SPECIMEN SOURCE: SIGNIFICANT CHANGE UP
SPECIMEN SOURCE: SIGNIFICANT CHANGE UP
T-CELL CD4 SUBSET PNL BLD: 5 /UL — LOW (ref 489–1457)
WBC # BLD: 15.6 K/UL — HIGH (ref 4.8–10.8)
WBC # BLD: 16.4 K/UL — SIGNIFICANT CHANGE UP (ref 4.8–10.8)
WBC # BLD: 16.6 K/UL — SIGNIFICANT CHANGE UP (ref 4.8–10.8)
WBC # BLD: 17.4 K/UL — SIGNIFICANT CHANGE UP (ref 4.8–10.8)
WBC # BLD: 17.8 K/UL — HIGH (ref 4.8–10.8)
WBC # FLD AUTO: 15.6 K/UL — HIGH (ref 4.8–10.8)
WBC # FLD AUTO: 16.4 K/UL — SIGNIFICANT CHANGE UP (ref 4.8–10.8)
WBC # FLD AUTO: 16.6 K/UL — SIGNIFICANT CHANGE UP (ref 4.8–10.8)
WBC # FLD AUTO: 17.4 K/UL — SIGNIFICANT CHANGE UP (ref 4.8–10.8)
WBC # FLD AUTO: 17.8 K/UL — HIGH (ref 4.8–10.8)

## 2018-01-17 PROCEDURE — 99233 SBSQ HOSP IP/OBS HIGH 50: CPT

## 2018-01-17 PROCEDURE — 99223 1ST HOSP IP/OBS HIGH 75: CPT

## 2018-01-17 PROCEDURE — 99291 CRITICAL CARE FIRST HOUR: CPT

## 2018-01-17 PROCEDURE — 88112 CYTOPATH CELL ENHANCE TECH: CPT | Mod: 26

## 2018-01-17 PROCEDURE — 71045 X-RAY EXAM CHEST 1 VIEW: CPT | Mod: 26,77

## 2018-01-17 PROCEDURE — 71045 X-RAY EXAM CHEST 1 VIEW: CPT | Mod: 26

## 2018-01-17 RX ORDER — AZITHROMYCIN 500 MG/1
TABLET, FILM COATED ORAL
Qty: 0 | Refills: 0 | Status: DISCONTINUED | OUTPATIENT
Start: 2018-01-17 | End: 2018-01-18

## 2018-01-17 RX ORDER — DEXMEDETOMIDINE HYDROCHLORIDE IN 0.9% SODIUM CHLORIDE 4 UG/ML
0.5 INJECTION INTRAVENOUS
Qty: 200 | Refills: 0 | Status: DISCONTINUED | OUTPATIENT
Start: 2018-01-17 | End: 2018-01-18

## 2018-01-17 RX ORDER — FENTANYL CITRATE 50 UG/ML
0.5 INJECTION INTRAVENOUS
Qty: 5000 | Refills: 0 | Status: DISCONTINUED | OUTPATIENT
Start: 2018-01-17 | End: 2018-01-17

## 2018-01-17 RX ORDER — NOREPINEPHRINE BITARTRATE/D5W 8 MG/250ML
0.02 PLASTIC BAG, INJECTION (ML) INTRAVENOUS
Qty: 8 | Refills: 0 | Status: DISCONTINUED | OUTPATIENT
Start: 2018-01-17 | End: 2018-01-18

## 2018-01-17 RX ORDER — PANTOPRAZOLE SODIUM 20 MG/1
40 TABLET, DELAYED RELEASE ORAL EVERY 12 HOURS
Qty: 0 | Refills: 0 | Status: DISCONTINUED | OUTPATIENT
Start: 2018-01-17 | End: 2018-01-18

## 2018-01-17 RX ORDER — CHLORHEXIDINE GLUCONATE 213 G/1000ML
15 SOLUTION TOPICAL EVERY 12 HOURS
Qty: 0 | Refills: 0 | Status: DISCONTINUED | OUTPATIENT
Start: 2018-01-17 | End: 2018-01-18

## 2018-01-17 RX ORDER — FENTANYL CITRATE 50 UG/ML
50 INJECTION INTRAVENOUS ONCE
Qty: 0 | Refills: 0 | Status: DISCONTINUED | OUTPATIENT
Start: 2018-01-17 | End: 2018-01-17

## 2018-01-17 RX ORDER — AZITHROMYCIN 500 MG/1
500 TABLET, FILM COATED ORAL EVERY 24 HOURS
Qty: 0 | Refills: 0 | Status: DISCONTINUED | OUTPATIENT
Start: 2018-01-18 | End: 2018-01-18

## 2018-01-17 RX ORDER — VANCOMYCIN HCL 1 G
1000 VIAL (EA) INTRAVENOUS EVERY 24 HOURS
Qty: 0 | Refills: 0 | Status: DISCONTINUED | OUTPATIENT
Start: 2018-01-17 | End: 2018-01-18

## 2018-01-17 RX ORDER — FENTANYL CITRATE 50 UG/ML
0.5 INJECTION INTRAVENOUS
Qty: 2500 | Refills: 0 | Status: DISCONTINUED | OUTPATIENT
Start: 2018-01-17 | End: 2018-01-17

## 2018-01-17 RX ORDER — CALCIUM GLUCONATE 100 MG/ML
1 VIAL (ML) INTRAVENOUS ONCE
Qty: 0 | Refills: 0 | Status: COMPLETED | OUTPATIENT
Start: 2018-01-17 | End: 2018-01-17

## 2018-01-17 RX ORDER — AZITHROMYCIN 500 MG/1
500 TABLET, FILM COATED ORAL ONCE
Qty: 0 | Refills: 0 | Status: COMPLETED | OUTPATIENT
Start: 2018-01-17 | End: 2018-01-17

## 2018-01-17 RX ORDER — PANTOPRAZOLE SODIUM 20 MG/1
40 TABLET, DELAYED RELEASE ORAL DAILY
Qty: 0 | Refills: 0 | Status: DISCONTINUED | OUTPATIENT
Start: 2018-01-17 | End: 2018-01-17

## 2018-01-17 RX ORDER — SODIUM CHLORIDE 9 MG/ML
500 INJECTION, SOLUTION INTRAVENOUS ONCE
Qty: 0 | Refills: 0 | Status: COMPLETED | OUTPATIENT
Start: 2018-01-17 | End: 2018-01-17

## 2018-01-17 RX ADMIN — CEFEPIME 100 MILLIGRAM(S): 1 INJECTION, POWDER, FOR SOLUTION INTRAMUSCULAR; INTRAVENOUS at 11:03

## 2018-01-17 RX ADMIN — NYSTATIN CREAM 1 APPLICATION(S): 100000 CREAM TOPICAL at 05:51

## 2018-01-17 RX ADMIN — AZITHROMYCIN 255 MILLIGRAM(S): 500 TABLET, FILM COATED ORAL at 15:32

## 2018-01-17 RX ADMIN — Medication 50 MILLIGRAM(S): at 06:43

## 2018-01-17 RX ADMIN — Medication 100 MILLIGRAM(S): at 04:34

## 2018-01-17 RX ADMIN — Medication 103 MILLIGRAM(S): at 01:00

## 2018-01-17 RX ADMIN — Medication 103 MILLIGRAM(S): at 14:10

## 2018-01-17 RX ADMIN — Medication 100 MILLIGRAM(S): at 17:19

## 2018-01-17 RX ADMIN — PANTOPRAZOLE SODIUM 40 MILLIGRAM(S): 20 TABLET, DELAYED RELEASE ORAL at 17:20

## 2018-01-17 RX ADMIN — Medication 520 MILLIGRAM(S): at 14:09

## 2018-01-17 RX ADMIN — Medication 102 MILLIGRAM(S): at 05:48

## 2018-01-17 RX ADMIN — NYSTATIN CREAM 1 APPLICATION(S): 100000 CREAM TOPICAL at 17:20

## 2018-01-17 RX ADMIN — Medication 100 MILLIGRAM(S): at 11:03

## 2018-01-17 RX ADMIN — FENTANYL CITRATE 50 MICROGRAM(S): 50 INJECTION INTRAVENOUS at 07:30

## 2018-01-17 RX ADMIN — DEXMEDETOMIDINE HYDROCHLORIDE IN 0.9% SODIUM CHLORIDE 7.26 MICROGRAM(S)/KG/HR: 4 INJECTION INTRAVENOUS at 21:08

## 2018-01-17 RX ADMIN — Medication 250 MILLIGRAM(S): at 12:54

## 2018-01-17 RX ADMIN — Medication 50 MILLIGRAM(S): at 00:00

## 2018-01-17 RX ADMIN — FENTANYL CITRATE 50 MICROGRAM(S): 50 INJECTION INTRAVENOUS at 07:18

## 2018-01-17 RX ADMIN — SODIUM CHLORIDE 500 MILLILITER(S): 9 INJECTION, SOLUTION INTRAVENOUS at 01:15

## 2018-01-17 RX ADMIN — ETHAMBUTOL HYDROCHLORIDE 800 MILLIGRAM(S): 400 TABLET, FILM COATED ORAL at 12:29

## 2018-01-17 RX ADMIN — CHLORHEXIDINE GLUCONATE 15 MILLILITER(S): 213 SOLUTION TOPICAL at 17:19

## 2018-01-17 RX ADMIN — Medication 200 GRAM(S): at 21:14

## 2018-01-17 RX ADMIN — Medication 50 MILLIGRAM(S): at 12:29

## 2018-01-17 RX ADMIN — Medication 50 MILLIGRAM(S): at 17:19

## 2018-01-17 RX ADMIN — Medication 103 MILLIGRAM(S): at 08:41

## 2018-01-17 RX ADMIN — Medication 52 MILLIGRAM(S): at 17:20

## 2018-01-17 RX ADMIN — Medication 103 MILLIGRAM(S): at 21:13

## 2018-01-17 NOTE — PROGRESS NOTE ADULT - SUBJECTIVE AND OBJECTIVE BOX
Patient is a 67y old  Female who presents with a chief complaint of abdominal pain fever and diarrhea (2018 05:07)      BRIEF HOSPITAL COURSE:   Pt is a 67 YOF h/o DM, HTN, MI, REGI who was admitted after presenting with fever, diarrhea and oral thrush.  She tested positive for HIV this admission and was found to have a CD4 count of 5.  Stool was checked and cultured positive for Norovirus.  Today was recalled to evaluate this patient for ICU due to hypotension, tachycardia and hypoglycemia.  Pt was receiving a 1L IVF bolus and had received 50cc D50.  Pt was moved to ICU as she may require intubation and pressors.  Spoke with ID who was still awaiting CD4 count and will modify ABx now that CD4 resulted as 5, will need PCP prophylaxis, new blood cultures, UA and CXR done this am.  Pt's family updated with , 2 daughters at bedside, pt's surrogate/daughter Lala Carrillo was updated via phone as well.  Pt is critically ill and now meeting criteria for AIDS and is in shock which has thus far responded to fluids.    Events last 24 hours: Patient was placed on levophed for Hypotension,  required intubation for respiratory distress, patient on Marik protocol for sepsis and started on antibiotics for suspected MAC and PCP pneumonia given her worsening condition and recent diagnosis of AIDS with a CD4 count of 5. Central line placed. OG tube placed    PAST MEDICAL & SURGICAL HISTORY:  Anemia  Sleep apnea  Myocardial infarction  GERD (gastroesophageal reflux disease)  Dyslipidemia  Hypertension  Diabetes mellitus  H/O right knee surgery:   S/P cataract extraction: B/L  S/P partial thyroidectomy  S/P tubal ligation: s/p cyst removal of left axilla  S/P cholecystectomy    Allergies    No Known Allergies    Intolerances      FAMILY HISTORY:  No pertinent family history in first degree relatives      Review of Systems:  CONSTITUTIONAL: No fever, chills, or fatigue  EYES: No eye pain, visual disturbances, or discharge  ENMT:  No difficulty hearing, tinnitus, vertigo; No sinus or throat pain  NECK: No pain or stiffness  RESPIRATORY: c/o mild shortness of breath  CARDIOVASCULAR: No chest pain, palpitations, dizziness, or leg swelling  GASTROINTESTINAL: c/o abdominal pain, diarrhea  GENITOURINARY: No dysuria, frequency, hematuria, or incontinence  NEUROLOGICAL: No headaches, memory loss, loss of strength, numbness, or tremors  SKIN: No itching, burning, rashes, or lesions   MUSCULOSKELETAL: No joint pain or swelling; No muscle, back, or extremity pain  PSYCHIATRIC: No depression, anxiety, mood swings, or difficulty sleeping      Medications:  cefepime  IVPB 2000 milliGRAM(s) IV Intermittent every 24 hours  clotrimazole Lozenge 1 Lozenge Oral five times a day              aluminum hydroxide/magnesium hydroxide/simethicone Suspension 30 milliLiter(s) Oral every 4 hours PRN      dextrose 50% Injectable 12.5 Gram(s) IV Push once  dextrose 50% Injectable 25 Gram(s) IV Push once  dextrose 50% Injectable 25 Gram(s) IV Push once  dextrose Gel 1 Dose(s) Oral once PRN  dextrose Gel 1 Dose(s) Oral once PRN  glucagon  Injectable 1 milliGRAM(s) IntraMuscular once PRN    dextrose 5%. 1000 milliLiter(s) IV Continuous <Continuous>  sodium bicarbonate  Infusion 0.323 mEq/kG/Hr IV Continuous <Continuous>      nystatin Cream 1 Application(s) Topical two times a day    ICU Vital Signs Last 24 Hrs  T(C): 37.4 (2018 11:00), Max: 37.9 (2018 20:00)  T(F): 99.3 (2018 11:00), Max: 100.3 (2018 20:00)  HR: 123 (2018 12:06) (99 - 143)  BP: 98/53 (2018 10:45) (61/39 - 169/68)  BP(mean): 71 (2018 10:45) (38 - 98)  ABP: --  ABP(mean): --  RR: 37 (2018 11:00) (24 - 42)  SpO2: 97% (2018 12:06) (78% - 100%)        I&O's Detail    2018 07:01  -  2018 07:00  --------------------------------------------------------  IN:    Lactated Ringers IV Bolus: 500 mL    norepinephrine Infusion: 9 mL    sodium bicarbonate  Infusion: 1625 mL    Solution: 100 mL    Solution: 1500 mL    Solution: 100 mL    Solution: 100 mL    Solution: 200 mL  Total IN: 4134 mL    OUT:    Indwelling Catheter - Urethral: 50 mL    Intermittent Catheterization - Urethral: 235 mL  Total OUT: 285 mL    Total NET: 3849 mL      2018 07:01  -  2018 12:34  --------------------------------------------------------  IN:    dexmedetomidine Infusion: 7.3 mL    norepinephrine Infusion: 123.6 mL    Packed Red Blood Cells: 350 mL    sodium bicarbonate  Infusion: 600 mL    Solution: 100 mL    Solution: 100 mL  Total IN: 1280.9 mL    OUT:    Indwelling Catheter - Urethral: 45 mL  Total OUT: 45 mL    Total NET: 1235.9 mL                CBC Full  -  ( 2018 07:43 )  WBC Count : 15.6 K/uL  Hemoglobin : 5.5 g/dL  Hematocrit : 17.3 %  Platelet Count - Automated : 122 K/uL  Mean Cell Volume : 83.6 fl  Mean Cell Hemoglobin : 26.6 pg  Mean Cell Hemoglobin Concentration : 31.8 g/dL  Auto Neutrophil # : x  Auto Lymphocyte # : x  Auto Monocyte # : x  Auto Eosinophil # : x  Auto Basophil # : x  Auto Neutrophil % : x  Auto Lymphocyte % : x  Auto Monocyte % : x  Auto Eosinophil % : x  Auto Basophil % : x        137  |  95<L>  |  45.0<H>  ----------------------------<  86  5.3   |  20.0<L>  |  1.26    Ca    8.6      2018 07:43  Phos  7.2       Mg     2.2         TPro  3.9<L>  /  Alb  1.7<L>  /  TBili  2.4<H>  /  DBili  x   /  AST  1654<H>  /  ALT  161<H>  /  AlkPhos  356<H>            CAPILLARY BLOOD GLUCOSE      POCT Blood Glucose.: 92 mg/dL (2018 12:36)    PT/INR - ( 2018 07:14 )   PT: 19.6 sec;   INR: 1.76 ratio         PTT - ( 2018 07:14 )  PTT:40.5 sec  Urinalysis Basic - ( 2018 11:33 )    Color: Yellow / Appearance: Clear / S.015 / pH: x  Gluc: x / Ketone: Trace  / Bili: Moderate / Urobili: 1 mg/dL   Blood: x / Protein: 30 mg/dL / Nitrite: Negative   Leuk Esterase: Trace / RBC: 0-2 /HPF / WBC 10-15 /HPF   Sq Epi: x / Non Sq Epi: Moderate / Bacteria: Few      CULTURES:  Culture Results:   Testing in progress ( @ 16:21)  Culture Results:   No growth at 48 hours ( @ 22:08)  Culture Results:   No growth at 48 hours ( @ 22:08)  Culture Results:   No Cryptosporidium, Cyclospora sp. or Isospora found by modified acid  fast stain ( @ 07:30)  Culture Results:   No Microsporidia species found  by modified trichrome stain ( @ 07:30)  Culture Results:   No Protozoa seen by trichrome stain  No Helminths or Protozoa seen in formalin concentrate  performed by iodine stain  (routine O+P not evaluated for Microsporidia,  Cryptosporidia, Cyclospora, or Isospora.)  Note: One negative specimen does not rule  out the possibility of a parasitic infection. ( @ 22:51)  Culture Results:   No Protozoa seen by trichrome stain  No Helminths or Protozoa seen in formalin concentrate  performed by iodine stain  (routine O+P not evaluated for Microsporidia,  Cryptosporidia, Cyclospora, or Isospora.)  Note: One negative specimen does not rule  out the possibility of a parasitic infection. ( @ 19:48)  Culture Results:   Norovirus GI/GII  DETECTED by PCR  *******Please Note:*******  GI panel PCR evaluates for:  Campylobacter, Plesiomonas shigelloides, Salmonella,  Vibrio, Yersinia enterocolitica, Enteroaggregative  Escherichia coli (EAEC), Enteropathogenic E.coli (EPEC),  Enterotoxigenic E. coli (ETEC) lt/st, Shiga-like  toxin-producing E. coli (STEC) stx1/stx2,  Shigella/ Enteroinvasive E. coli (EIEC), Cryptosporidium,  Cyclospora cayetanensis, Entamoeba histolytica,  Giardia lamblia, Adenovirus F 40/41, Astrovirus,  Norovirus GI/GII, Rotavirus A, Sapovirus ( @ 08:31)  Culture Results:   No growth (01-10 @ 02:43)  Culture Results:   No growth at 5 days. (01-10 @ 02:16)  Culture Results:   No growth at 5 days. (01-10 @ 01:49)  Culture Results:   No Protozoa seen by trichrome stain  No Helminths or Protozoa seen in formalin concentrate  performed by iodine stain  (routine O+P not evaluated for Microsporidia,  Cryptosporidia, Cyclospora, or Isospora.)  Note: One negative specimen does not rule  out the possibility of a parasitic infection. ( @ 18:36)  Culture Results:   Giardia antigen negative performed by rapid immunoassay (non-enzymatic).  (It is recommended that all specimens tested by  an immunochromatographic card test be  confirmed by another method.) ( @ 18:36)  Culture Results:   No Protozoa seen by trichrome stain  No Helminths or Protozoa seen in formalin concentrate  performed by iodine stain  (routine O+P not evaluated for Microsporidia,  Cryptosporidia, Cyclospora, or Isospora.)  Note: One negative specimen does not rule  out the possibility of a parasitic infection. ( @ 18:36)      Physical Examination:    General: minimally responsive to sternal rub    HEENT: Pupils equal, reactive to light.  Symmetric. patient is intubated, OG tube in place    PULM: Course LS bilaterally, no significant sputum production    CVS: Tachycardic, no murmurs, rubs, or gallops    ABD: Soft, mildly distended,  normoactive bowel sounds, no masses    EXT: No edema, nontender    SKIN: Warm and well perfused, no rashes noted.    RADIOLOGY:     < from: Xray Chest 1 View AP- PORTABLE-Urgent (18 @ 10:23) >     EXAM:  XR CHEST PORTABLE URGENT 1V                          PROCEDURE DATE:  2018          INTERPRETATION:  HISTORY: New right-sided central line placement  TECHNIQUE: Portable frontal view of the chest, 1 view.  COMPARISON: 2018.  FINDINGS:   New right central line is in the distal SVC region. The endotracheal tube   is above the fausto. The nasogastric tube courses below the left   hemidiaphragm, tip off edge of film.  HEART:  Enlarged  LUNGS: Tubing overlies and obscures the right apex. Again seen are dense   bilateral predominantly perihilar consolidative changes. There are new   small bilateral effusions.     OSSEOUS STRUCTURES:: degenerative changes    IMPRESSION:     No pneumothorax.                REJI VALENZUELA M.D., ATTENDING RADIOLOGIST  This document has been electronically signed. 2018 10:32AM        < end of copied text > Patient is a 67y old  Female who presents with a chief complaint of abdominal pain fever and diarrhea (2018 05:07)      BRIEF HOSPITAL COURSE:   Pt is a 67 YOF h/o DM, HTN, MI, REGI who was admitted after presenting with fever, diarrhea and oral thrush.  She tested positive for HIV this admission and was found to have a CD4 count of 5.  Stool was checked and cultured positive for Norovirus.  Today was recalled to evaluate this patient for ICU due to hypotension, tachycardia and hypoglycemia.  Pt was receiving a 1L IVF bolus and had received 50cc D50.  Pt was moved to ICU as she may require intubation and pressors.  Spoke with ID who was still awaiting CD4 count and will modify ABx now that CD4 resulted as 5, will need PCP prophylaxis, new blood cultures, UA and CXR done this am.  Pt's family updated with , 2 daughters at bedside, pt's surrogate/daughter Lala Carrillo was updated via phone as well.  Pt is critically ill and now meeting criteria for AIDS and is in shock which has thus far responded to fluids.    Events last 24 hours: Patient was placed on levophed for Hypotension,  required intubation for respiratory distress, patient on Marik protocol for sepsis and started on antibiotics for suspected MAC and PCP pneumonia given her worsening condition and recent diagnosis of AIDS with a CD4 count of 5. Central line placed. OG tube placed    PAST MEDICAL & SURGICAL HISTORY:  Anemia  Sleep apnea  Myocardial infarction  GERD (gastroesophageal reflux disease)  Dyslipidemia  Hypertension  Diabetes mellitus  H/O right knee surgery:   S/P cataract extraction: B/L  S/P partial thyroidectomy  S/P tubal ligation: s/p cyst removal of left axilla  S/P cholecystectomy    Allergies    No Known Allergies    Intolerances      FAMILY HISTORY:  No pertinent family history in first degree relatives      Review of Systems:  unable to obtain      Medications:  cefepime  IVPB 2000 milliGRAM(s) IV Intermittent every 24 hours  clotrimazole Lozenge 1 Lozenge Oral five times a day              aluminum hydroxide/magnesium hydroxide/simethicone Suspension 30 milliLiter(s) Oral every 4 hours PRN      dextrose 50% Injectable 12.5 Gram(s) IV Push once  dextrose 50% Injectable 25 Gram(s) IV Push once  dextrose 50% Injectable 25 Gram(s) IV Push once  dextrose Gel 1 Dose(s) Oral once PRN  dextrose Gel 1 Dose(s) Oral once PRN  glucagon  Injectable 1 milliGRAM(s) IntraMuscular once PRN    dextrose 5%. 1000 milliLiter(s) IV Continuous <Continuous>  sodium bicarbonate  Infusion 0.323 mEq/kG/Hr IV Continuous <Continuous>      nystatin Cream 1 Application(s) Topical two times a day    ICU Vital Signs Last 24 Hrs  T(C): 37.4 (2018 11:00), Max: 37.9 (2018 20:00)  T(F): 99.3 (2018 11:00), Max: 100.3 (2018 20:00)  HR: 123 (2018 12:06) (99 - 143)  BP: 98/53 (2018 10:45) (61/39 - 169/68)  BP(mean): 71 (2018 10:45) (38 - 98)  ABP: --  ABP(mean): --  RR: 37 (2018 11:00) (24 - 42)  SpO2: 97% (2018 12:06) (78% - 100%)        I&O's Detail    2018 07:01  -  2018 07:00  --------------------------------------------------------  IN:    Lactated Ringers IV Bolus: 500 mL    norepinephrine Infusion: 9 mL    sodium bicarbonate  Infusion: 1625 mL    Solution: 100 mL    Solution: 1500 mL    Solution: 100 mL    Solution: 100 mL    Solution: 200 mL  Total IN: 4134 mL    OUT:    Indwelling Catheter - Urethral: 50 mL    Intermittent Catheterization - Urethral: 235 mL  Total OUT: 285 mL    Total NET: 3849 mL      2018 07:01  -  2018 12:34  --------------------------------------------------------  IN:    dexmedetomidine Infusion: 7.3 mL    norepinephrine Infusion: 123.6 mL    Packed Red Blood Cells: 350 mL    sodium bicarbonate  Infusion: 600 mL    Solution: 100 mL    Solution: 100 mL  Total IN: 1280.9 mL    OUT:    Indwelling Catheter - Urethral: 45 mL  Total OUT: 45 mL    Total NET: 1235.9 mL                CBC Full  -  ( 2018 07:43 )  WBC Count : 15.6 K/uL  Hemoglobin : 5.5 g/dL  Hematocrit : 17.3 %  Platelet Count - Automated : 122 K/uL  Mean Cell Volume : 83.6 fl  Mean Cell Hemoglobin : 26.6 pg  Mean Cell Hemoglobin Concentration : 31.8 g/dL  Auto Neutrophil # : x  Auto Lymphocyte # : x  Auto Monocyte # : x  Auto Eosinophil # : x  Auto Basophil # : x  Auto Neutrophil % : x  Auto Lymphocyte % : x  Auto Monocyte % : x  Auto Eosinophil % : x  Auto Basophil % : x        137  |  95<L>  |  45.0<H>  ----------------------------<  86  5.3   |  20.0<L>  |  1.26    Ca    8.6      2018 07:43  Phos  7.2       Mg     2.2         TPro  3.9<L>  /  Alb  1.7<L>  /  TBili  2.4<H>  /  DBili  x   /  AST  1654<H>  /  ALT  161<H>  /  AlkPhos  356<H>            CAPILLARY BLOOD GLUCOSE      POCT Blood Glucose.: 92 mg/dL (2018 12:36)    PT/INR - ( 2018 07:14 )   PT: 19.6 sec;   INR: 1.76 ratio         PTT - ( 2018 07:14 )  PTT:40.5 sec  Urinalysis Basic - ( 2018 11:33 )    Color: Yellow / Appearance: Clear / S.015 / pH: x  Gluc: x / Ketone: Trace  / Bili: Moderate / Urobili: 1 mg/dL   Blood: x / Protein: 30 mg/dL / Nitrite: Negative   Leuk Esterase: Trace / RBC: 0-2 /HPF / WBC 10-15 /HPF   Sq Epi: x / Non Sq Epi: Moderate / Bacteria: Few      CULTURES:  Culture Results:   Testing in progress ( @ 16:21)  Culture Results:   No growth at 48 hours ( @ 22:08)  Culture Results:   No growth at 48 hours ( @ 22:08)  Culture Results:   No Cryptosporidium, Cyclospora sp. or Isospora found by modified acid  fast stain ( @ 07:30)  Culture Results:   No Microsporidia species found  by modified trichrome stain ( @ 07:30)  Culture Results:   No Protozoa seen by trichrome stain  No Helminths or Protozoa seen in formalin concentrate  performed by iodine stain  (routine O+P not evaluated for Microsporidia,  Cryptosporidia, Cyclospora, or Isospora.)  Note: One negative specimen does not rule  out the possibility of a parasitic infection. ( @ 22:51)  Culture Results:   No Protozoa seen by trichrome stain  No Helminths or Protozoa seen in formalin concentrate  performed by iodine stain  (routine O+P not evaluated for Microsporidia,  Cryptosporidia, Cyclospora, or Isospora.)  Note: One negative specimen does not rule  out the possibility of a parasitic infection. ( @ 19:48)  Culture Results:   Norovirus GI/GII  DETECTED by PCR  *******Please Note:*******  GI panel PCR evaluates for:  Campylobacter, Plesiomonas shigelloides, Salmonella,  Vibrio, Yersinia enterocolitica, Enteroaggregative  Escherichia coli (EAEC), Enteropathogenic E.coli (EPEC),  Enterotoxigenic E. coli (ETEC) lt/st, Shiga-like  toxin-producing E. coli (STEC) stx1/stx2,  Shigella/ Enteroinvasive E. coli (EIEC), Cryptosporidium,  Cyclospora cayetanensis, Entamoeba histolytica,  Giardia lamblia, Adenovirus F 40/41, Astrovirus,  Norovirus GI/GII, Rotavirus A, Sapovirus ( @ 08:31)  Culture Results:   No growth (01-10 @ 02:43)  Culture Results:   No growth at 5 days. (01-10 @ 02:16)  Culture Results:   No growth at 5 days. (01-10 @ 01:49)  Culture Results:   No Protozoa seen by trichrome stain  No Helminths or Protozoa seen in formalin concentrate  performed by iodine stain  (routine O+P not evaluated for Microsporidia,  Cryptosporidia, Cyclospora, or Isospora.)  Note: One negative specimen does not rule  out the possibility of a parasitic infection. ( @ 18:36)  Culture Results:   Giardia antigen negative performed by rapid immunoassay (non-enzymatic).  (It is recommended that all specimens tested by  an immunochromatographic card test be  confirmed by another method.) ( @ 18:36)  Culture Results:   No Protozoa seen by trichrome stain  No Helminths or Protozoa seen in formalin concentrate  performed by iodine stain  (routine O+P not evaluated for Microsporidia,  Cryptosporidia, Cyclospora, or Isospora.)  Note: One negative specimen does not rule  out the possibility of a parasitic infection. ( @ 18:36)      Physical Examination:    General: minimally responsive to sternal rub    HEENT: Pupils equal, reactive to light.  Symmetric. patient is intubated, OG tube in place    PULM: Course LS bilaterally, no significant sputum production    CVS: Tachycardic, no murmurs, rubs, or gallops    ABD: Soft, mildly distended,  normoactive bowel sounds, no masses    EXT: No edema, nontender    SKIN: Warm and well perfused, no rashes noted.    RADIOLOGY:     < from: Xray Chest 1 View AP- PORTABLE-Urgent (18 @ 10:23) >     EXAM:  XR CHEST PORTABLE URGENT 1V                          PROCEDURE DATE:  2018          INTERPRETATION:  HISTORY: New right-sided central line placement  TECHNIQUE: Portable frontal view of the chest, 1 view.  COMPARISON: 2018.  FINDINGS:   New right central line is in the distal SVC region. The endotracheal tube   is above the fausto. The nasogastric tube courses below the left   hemidiaphragm, tip off edge of film.  HEART:  Enlarged  LUNGS: Tubing overlies and obscures the right apex. Again seen are dense   bilateral predominantly perihilar consolidative changes. There are new   small bilateral effusions.     OSSEOUS STRUCTURES:: degenerative changes    IMPRESSION:     No pneumothorax.                REJI VALENZUELA M.D., ATTENDING RADIOLOGIST  This document has been electronically signed. 2018 10:32AM        < end of copied text >

## 2018-01-17 NOTE — PROGRESS NOTE ADULT - PROBLEM SELECTOR PLAN 1
Noted to be hypotensive and tachycardic and now in shock  On pressors  Afebrile  Pancytopenia   On fluid bolus  Continue Cefepime pending cultures  Repeat blood cultures pending  CT Chest with + pneumonia  UA negative for UTI  CT A/P with no acute findings  Continue treatment for PJP with Bactrim, switch to suspension

## 2018-01-17 NOTE — PROGRESS NOTE ADULT - SUBJECTIVE AND OBJECTIVE BOX
Patient is a 67y old  Female who presents with a chief complaint of abdominal pain fever and diarrhea (09 Jan 2018 05:07)      HPI:PT is intubated,. Cannot give hx   68 y/o female with newly diagnosed HIV/AIDS. Transfered to ICU for sepsis. On Norepinephrine. Intubated this am.  Hgb dropped  in the 5 range from 7. Receiving tranfusion this am. No rectal bleeding or melena as per nursing. Dark material from NGT.    REVIEW OF SYSTEMS:Unable to give hx    PAST MEDICAL & SURGICAL HISTORY:  Anemia  Sleep apnea  Myocardial infarction  GERD (gastroesophageal reflux disease)  Dyslipidemia  Hypertension  Diabetes mellitus  H/O right knee surgery: 8/20  S/P cataract extraction: B/L  S/P partial thyroidectomy  S/P tubal ligation: s/p cyst removal of left axilla  S/P cholecystectomy      FAMILY HISTORY:  No pertinent family history in first degree relatives      SOCIAL HISTORY:  Smoking Status: [ ] Current, [ ] Former, [ ] Never  Pack Years:  [  ] EtOH-no  [  ] IVDA-no    MEDICATIONS:  MEDICATIONS  (STANDING):  ascorbic acid IVPB 1500 milliGRAM(s) IV Intermittent every 6 hours  azithromycin   Tablet 600 milliGRAM(s) Oral daily  cefepime  IVPB 2000 milliGRAM(s) IV Intermittent every 24 hours  chlorhexidine 0.12% Liquid 15 milliLiter(s) Swish and Spit every 12 hours  clotrimazole Lozenge 1 Lozenge Oral five times a day  dexmedetomidine Infusion 0.5 MICROgram(s)/kG/Hr (7.258 mL/Hr) IV Continuous <Continuous>  dextrose 5%. 1000 milliLiter(s) (50 mL/Hr) IV Continuous <Continuous>  dextrose 50% Injectable 12.5 Gram(s) IV Push once  dextrose 50% Injectable 25 Gram(s) IV Push once  dextrose 50% Injectable 25 Gram(s) IV Push once  ethambutol 800 milliGRAM(s) Oral daily  hydrocortisone sodium succinate Injectable 50 milliGRAM(s) IV Push every 6 hours  metroNIDAZOLE  IVPB      metroNIDAZOLE  IVPB 500 milliGRAM(s) IV Intermittent every 8 hours  norepinephrine Infusion 0.02 MICROgram(s)/kG/Min (2.177 mL/Hr) IV Continuous <Continuous>  nystatin Cream 1 Application(s) Topical two times a day  pantoprazole  Injectable 40 milliGRAM(s) IV Push every 12 hours  rifabutin 300 milliGRAM(s) Oral daily  sodium bicarbonate  Infusion 0.388 mEq/kG/Hr (150 mL/Hr) IV Continuous <Continuous>  thiamine IVPB 200 milliGRAM(s) IV Intermittent two times a day  trimethoprim / sulfamethoxazole IVPB 320 milliGRAM(s) IV Intermittent every 8 hours  vancomycin  IVPB 1000 milliGRAM(s) IV Intermittent every 24 hours    MEDICATIONS  (PRN):  dextrose Gel 1 Dose(s) Oral once PRN Blood Glucose LESS THAN 70 milliGRAM(s)/deciliter  dextrose Gel 1 Dose(s) Oral once PRN Blood Glucose LESS THAN 70 milliGRAM(s)/deciliter  glucagon  Injectable 1 milliGRAM(s) IntraMuscular once PRN Glucose LESS THAN 70 milligrams/deciliter      Allergies    No Known Allergies    Intolerances        Vital Signs Last 24 Hrs  T(C): 37.4 (17 Jan 2018 11:00), Max: 37.9 (16 Jan 2018 20:00)  T(F): 99.3 (17 Jan 2018 11:00), Max: 100.3 (16 Jan 2018 20:00)  HR: 123 (17 Jan 2018 12:06) (99 - 143)  BP: 98/53 (17 Jan 2018 10:45) (61/39 - 169/68)  BP(mean): 71 (17 Jan 2018 10:45) (38 - 98)  RR: 37 (17 Jan 2018 11:00) (22 - 42)  SpO2: 97% (17 Jan 2018 12:06) (78% - 100%)    01-16 @ 07:01 - 01-17 @ 07:00  --------------------------------------------------------  IN: 4134 mL / OUT: 285 mL / NET: 3849 mL    01-17 @ 07:01  -  01-17 @ 12:22  --------------------------------------------------------  IN: 1280.9 mL / OUT: 45 mL / NET: 1235.9 mL      Mode: AC/ CMV (Assist Control/ Continuous Mandatory Ventilation)  RR (machine): 30  TV (machine): 320  FiO2: 60  PEEP: 5  MAP: 7  PIP: 12      PHYSICAL EXAM:    General: Well developed; well nourished; in no acute distress  HEENT: MMM, conjunctiva and sclera clear  H- RRR  L- decreased BS B/L  Gastrointestinal: Soft, non-tender non-distended; Normal bowel sounds; No rebound or guarding  Extremities: Normal range of motion, No clubbing, cyanosis or edema  Neurological: Alert and oriented x3  Skin: Warm and dry. No obvious rash  rectal- brown stool       LABS:                        5.5    15.6  )-----------( 122      ( 17 Jan 2018 07:43 )             17.3     17 Jan 2018 07:43    137    |  95     |  45.0   ----------------------------<  86     5.3     |  20.0   |  1.26     Ca    8.6        17 Jan 2018 07:43  Phos  7.2       17 Jan 2018 07:43  Mg     2.2       17 Jan 2018 07:43    TPro  3.9    /  Alb  1.7    /  TBili  2.4    /  DBili  x      /  AST  1654   /  ALT  161    /  AlkPhos  356    / Amylase x      /Lipase x      17 Jan 2018 07:43              RADIOLOGY & ADDITIONAL STUDIES:     < from: CT Abdomen and Pelvis w/ IV Cont (01.16.18 @ 17:26) >  IMPRESSION:     Bilateral pulmonary infiltrates most pronounced in the upper lobes, right   side greater than left.    Small right pleural effusion.    Hepatomegaly.      ***Please see the addendum at the top of this report. It may contain   additional important information or changes.****    < end of copied text >

## 2018-01-17 NOTE — PROCEDURE NOTE - NSPOSTCAREGUIDE_GEN_A_CORE
Instructed patient/caregiver regarding signs and symptoms of infection/Verbal/written post procedure instructions were given to patient/caregiver/Instructed patient/caregiver to follow-up with primary care physician/Keep the cast/splint/dressing clean and dry/Care for catheter as per unit/ICU protocols
Care for catheter as per unit/ICU protocols

## 2018-01-17 NOTE — PROGRESS NOTE ADULT - PROBLEM SELECTOR PLAN 1
Patient was placed on levophed for Hypotension,  required intubation for respiratory distress, patient on Marik protocol for sepsis and started on antibiotics for suspected MAC and PCP pneumonia given her worsening condition and recent diagnosis of AIDS with a CD4 count of 5.

## 2018-01-17 NOTE — CONSULT NOTE ADULT - SUBJECTIVE AND OBJECTIVE BOX
HPI:    PERTINENT PMH REVIEWED: Yes No    PAST MEDICAL & SURGICAL HISTORY:  Anemia  Sleep apnea  Myocardial infarction  GERD (gastroesophageal reflux disease)  Dyslipidemia  Hypertension  Diabetes mellitus  H/O right knee surgery:   S/P cataract extraction: B/L  S/P partial thyroidectomy  S/P tubal ligation: s/p cyst removal of left axilla  S/P cholecystectomy      SOCIAL HISTORY:                                     Admitted from:  home  SNF  [facility]     Surrogate/HCP/Guardian: Phone#:    FAMILY HISTORY:  No pertinent family history in first degree relatives      Baseline ADLs (prior to admission):  Independent/ Dependent      Allergies    No Known Allergies    Intolerances        Present Symptoms:     Dyspnea: 0 1 2 3   Nausea/Vomiting: Yes No  Anxiety:  Yes No  Depression: Yes No  Fatigue: Yes No  Loss of appetite: Yes No    Pain:             Character-            Duration-            Effect-            Factors-            Frequency-            Location-            Severity-    Review of Systems: Reviewed                     Negative:                     Positive:  Unable to obtain due to poor mentation   All others negative    MEDICATIONS  (STANDING):  ascorbic acid IVPB 1500 milliGRAM(s) IV Intermittent every 6 hours  cefepime  IVPB 2000 milliGRAM(s) IV Intermittent every 24 hours  chlorhexidine 0.12% Liquid 15 milliLiter(s) Swish and Spit every 12 hours  clotrimazole Lozenge 1 Lozenge Oral five times a day  dexmedetomidine Infusion 0.5 MICROgram(s)/kG/Hr (7.258 mL/Hr) IV Continuous <Continuous>  dextrose 5%. 1000 milliLiter(s) (50 mL/Hr) IV Continuous <Continuous>  dextrose 50% Injectable 12.5 Gram(s) IV Push once  dextrose 50% Injectable 25 Gram(s) IV Push once  dextrose 50% Injectable 25 Gram(s) IV Push once  ethambutol 800 milliGRAM(s) Oral daily  hydrocortisone sodium succinate Injectable 50 milliGRAM(s) IV Push every 6 hours  metroNIDAZOLE  IVPB      metroNIDAZOLE  IVPB 500 milliGRAM(s) IV Intermittent every 8 hours  norepinephrine Infusion 0.02 MICROgram(s)/kG/Min (2.177 mL/Hr) IV Continuous <Continuous>  nystatin Cream 1 Application(s) Topical two times a day  pantoprazole  Injectable 40 milliGRAM(s) IV Push every 12 hours  rifabutin 300 milliGRAM(s) Oral daily  sodium bicarbonate  Infusion 0.388 mEq/kG/Hr (150 mL/Hr) IV Continuous <Continuous>  thiamine IVPB 200 milliGRAM(s) IV Intermittent two times a day  trimethoprim / sulfamethoxazole IVPB 320 milliGRAM(s) IV Intermittent every 8 hours  vancomycin  IVPB 1000 milliGRAM(s) IV Intermittent every 24 hours    MEDICATIONS  (PRN):  dextrose Gel 1 Dose(s) Oral once PRN Blood Glucose LESS THAN 70 milliGRAM(s)/deciliter  dextrose Gel 1 Dose(s) Oral once PRN Blood Glucose LESS THAN 70 milliGRAM(s)/deciliter  glucagon  Injectable 1 milliGRAM(s) IntraMuscular once PRN Glucose LESS THAN 70 milligrams/deciliter      PHYSICAL EXAM:    Vital Signs Last 24 Hrs  T(C): 37.4 (2018 11:00), Max: 37.9 (2018 20:00)  T(F): 99.3 (2018 11:00), Max: 100.3 (2018 20:00)  HR: 123 (2018 12:06) (99 - 143)  BP: 98/53 (2018 10:45) (61/39 - 169/68)  BP(mean): 71 (2018 10:45) (38 - 98)  RR: 37 (2018 11:00) (24 - 42)  SpO2: 97% (2018 12:06) (78% - 100%)    General: alert  oriented x ____ lethargic agitated                  cachexia  nonverbal  coma    Karnofsky:  %    HEENT: normal  dry mouth  ET tube/trach    Lungs: comfortable tachypnea/labored breathing  excessive secretions    CV: normal  tachycardia    GI: normal  distended  tender  no BS               PEG/NG/OG tube  constipation  last BM:     : normal  incontinent  oliguria/anuria  nishi    MSK: normal  weakness  edema             ambulatory  bedbound/wheelchair bound    Skin: normal  pressure ulcers- Stage_____  no rash    LABS:                        5.5    15.6  )-----------( 122      ( 2018 07:43 )             17.3     01-17    137  |  95<L>  |  45.0<H>  ----------------------------<  86  5.3   |  20.0<L>  |  1.26    Ca    8.6      2018 07:43  Phos  7.2     -  Mg     2.2     01-17    TPro  3.9<L>  /  Alb  1.7<L>  /  TBili  2.4<H>  /  DBili  x   /  AST  1654<H>  /  ALT  161<H>  /  AlkPhos  356<H>      PT/INR - ( 2018 07:14 )   PT: 19.6 sec;   INR: 1.76 ratio         PTT - ( 2018 07:14 )  PTT:40.5 sec  Urinalysis Basic - ( 2018 11:33 )    Color: Yellow / Appearance: Clear / S.015 / pH: x  Gluc: x / Ketone: Trace  / Bili: Moderate / Urobili: 1 mg/dL   Blood: x / Protein: 30 mg/dL / Nitrite: Negative   Leuk Esterase: Trace / RBC: 0-2 /HPF / WBC 10-15 /HPF   Sq Epi: x / Non Sq Epi: Moderate / Bacteria: Few      I&O's Summary    2018 07:  -  2018 07:00  --------------------------------------------------------  IN: 4134 mL / OUT: 285 mL / NET: 3849 mL    2018 07:  -  2018 12:38  --------------------------------------------------------  IN: 1280.9 mL / OUT: 45 mL / NET: 1235.9 mL        RADIOLOGY & ADDITIONAL STUDIES:    ADVANCE DIRECTIVES:   DNR YES NO  Completed on:                     MOLST  YES NO   Completed on:  Living Will  YES NO   Completed on: HPI: 67F with PMH as listed admitted with     PERTINENT PMH REVIEWED: Yes     PAST MEDICAL & SURGICAL HISTORY:  Anemia  Sleep apnea  Myocardial infarction  GERD (gastroesophageal reflux disease)  Dyslipidemia  Hypertension  Diabetes mellitus  H/O right knee surgery:   S/P cataract extraction: B/L  S/P partial thyroidectomy  S/P tubal ligation: s/p cyst removal of left axilla  S/P cholecystectomy    SOCIAL HISTORY:                                     Admitted from:  home  SNF  [facility]     Surrogate - 8 children - family has assigned daughter Lala Carrillo     FAMILY HISTORY:  No pertinent family history in first degree relatives      Baseline ADLs (prior to admission):  Independent/ Dependent      Allergies    No Known Allergies    Intolerances        Present Symptoms:     Dyspnea: 0 1 2 3   Nausea/Vomiting: Yes No  Anxiety:  Yes No  Depression: Yes No  Fatigue: Yes No  Loss of appetite: Yes No    Pain:             Character-            Duration-            Effect-            Factors-            Frequency-            Location-            Severity-    Review of Systems: Reviewed                     Negative:                     Positive:  Unable to obtain due to poor mentation   All others negative    MEDICATIONS  (STANDING):  ascorbic acid IVPB 1500 milliGRAM(s) IV Intermittent every 6 hours  cefepime  IVPB 2000 milliGRAM(s) IV Intermittent every 24 hours  chlorhexidine 0.12% Liquid 15 milliLiter(s) Swish and Spit every 12 hours  clotrimazole Lozenge 1 Lozenge Oral five times a day  dexmedetomidine Infusion 0.5 MICROgram(s)/kG/Hr (7.258 mL/Hr) IV Continuous <Continuous>  dextrose 5%. 1000 milliLiter(s) (50 mL/Hr) IV Continuous <Continuous>  dextrose 50% Injectable 12.5 Gram(s) IV Push once  dextrose 50% Injectable 25 Gram(s) IV Push once  dextrose 50% Injectable 25 Gram(s) IV Push once  ethambutol 800 milliGRAM(s) Oral daily  hydrocortisone sodium succinate Injectable 50 milliGRAM(s) IV Push every 6 hours  metroNIDAZOLE  IVPB      metroNIDAZOLE  IVPB 500 milliGRAM(s) IV Intermittent every 8 hours  norepinephrine Infusion 0.02 MICROgram(s)/kG/Min (2.177 mL/Hr) IV Continuous <Continuous>  nystatin Cream 1 Application(s) Topical two times a day  pantoprazole  Injectable 40 milliGRAM(s) IV Push every 12 hours  rifabutin 300 milliGRAM(s) Oral daily  sodium bicarbonate  Infusion 0.388 mEq/kG/Hr (150 mL/Hr) IV Continuous <Continuous>  thiamine IVPB 200 milliGRAM(s) IV Intermittent two times a day  trimethoprim / sulfamethoxazole IVPB 320 milliGRAM(s) IV Intermittent every 8 hours  vancomycin  IVPB 1000 milliGRAM(s) IV Intermittent every 24 hours    MEDICATIONS  (PRN):  dextrose Gel 1 Dose(s) Oral once PRN Blood Glucose LESS THAN 70 milliGRAM(s)/deciliter  dextrose Gel 1 Dose(s) Oral once PRN Blood Glucose LESS THAN 70 milliGRAM(s)/deciliter  glucagon  Injectable 1 milliGRAM(s) IntraMuscular once PRN Glucose LESS THAN 70 milligrams/deciliter      PHYSICAL EXAM:    Vital Signs Last 24 Hrs  T(C): 37.4 (2018 11:00), Max: 37.9 (2018 20:00)  T(F): 99.3 (2018 11:00), Max: 100.3 (2018 20:00)  HR: 123 (2018 12:06) (99 - 143)  BP: 98/53 (2018 10:45) (61/39 - 169/68)  BP(mean): 71 (2018 10:45) (38 - 98)  RR: 37 (2018 11:00) (24 - 42)  SpO2: 97% (2018 12:06) (78% - 100%)    General: alert  oriented x ____ lethargic agitated                  cachexia  nonverbal  coma    Karnofsky:  %    HEENT: normal  dry mouth  ET tube/trach    Lungs: comfortable tachypnea/labored breathing  excessive secretions    CV: normal  tachycardia    GI: normal  distended  tender  no BS               PEG/NG/OG tube  constipation  last BM:     : normal  incontinent  oliguria/anuria  almodovar    MSK: normal  weakness  edema             ambulatory  bedbound/wheelchair bound    Skin: normal  pressure ulcers- Stage_____  no rash    LABS:                        5.5    15.6  )-----------( 122      ( 2018 07:43 )             17.3     01-17    137  |  95<L>  |  45.0<H>  ----------------------------<  86  5.3   |  20.0<L>  |  1.26    Ca    8.6      2018 07:43  Phos  7.2     01-17  Mg     2.2         TPro  3.9<L>  /  Alb  1.7<L>  /  TBili  2.4<H>  /  DBili  x   /  AST  1654<H>  /  ALT  161<H>  /  AlkPhos  356<H>      PT/INR - ( 2018 07:14 )   PT: 19.6 sec;   INR: 1.76 ratio         PTT - ( 2018 07:14 )  PTT:40.5 sec  Urinalysis Basic - ( 2018 11:33 )    Color: Yellow / Appearance: Clear / S.015 / pH: x  Gluc: x / Ketone: Trace  / Bili: Moderate / Urobili: 1 mg/dL   Blood: x / Protein: 30 mg/dL / Nitrite: Negative   Leuk Esterase: Trace / RBC: 0-2 /HPF / WBC 10-15 /HPF   Sq Epi: x / Non Sq Epi: Moderate / Bacteria: Few      I&O's Summary    2018 07:  -  2018 07:00  --------------------------------------------------------  IN: 4134 mL / OUT: 285 mL / NET: 3849 mL    2018 07:  -  2018 12:38  --------------------------------------------------------  IN: 1280.9 mL / OUT: 45 mL / NET: 1235.9 mL        RADIOLOGY & ADDITIONAL STUDIES:    ADVANCE DIRECTIVES:   DNR YES NO  Completed on:                     MOLST  YES NO   Completed on:  Living Will  YES NO   Completed on: HPI: 67F with PMH as listed admitted with fever, diarrhea, abdominal pain, diagnosed with norovirus, recently diagnosed with HIV. Yesterday developed worsening hypoxic respiratory failure, hypotension/shock requiring transfer to the ICU, and eventually intubated.  She is currently in multiorgan failure, in shock on pressors, with very guarded outlook.     PERTINENT PMH REVIEWED: Yes     PAST MEDICAL & SURGICAL HISTORY:  Anemia  Sleep apnea  Myocardial infarction  GERD (gastroesophageal reflux disease)  Dyslipidemia  Hypertension  Diabetes mellitus  H/O right knee surgery:   S/P cataract extraction: B/L  S/P partial thyroidectomy  S/P tubal ligation: s/p cyst removal of left axilla  S/P cholecystectomy    SOCIAL HISTORY:                                     Admitted from:  home     Surrogate - 8 children - family has assigned daughter Lala Carrillo     FAMILY HISTORY:  No pertinent family history in first degree relatives      Baseline ADLs (prior to admission):  Independent/ Dependent      Allergies    No Known Allergies    Intolerances        Present Symptoms:     Dyspnea: 0 1 2 3   Nausea/Vomiting: Yes No  Anxiety:  Yes No  Depression: Yes No  Fatigue: Yes No  Loss of appetite: Yes No    Pain:             Character-            Duration-            Effect-            Factors-            Frequency-            Location-            Severity-    Review of Systems: Reviewed                     Negative:                     Positive:  Unable to obtain due to poor mentation   All others negative    MEDICATIONS  (STANDING):  ascorbic acid IVPB 1500 milliGRAM(s) IV Intermittent every 6 hours  cefepime  IVPB 2000 milliGRAM(s) IV Intermittent every 24 hours  chlorhexidine 0.12% Liquid 15 milliLiter(s) Swish and Spit every 12 hours  clotrimazole Lozenge 1 Lozenge Oral five times a day  dexmedetomidine Infusion 0.5 MICROgram(s)/kG/Hr (7.258 mL/Hr) IV Continuous <Continuous>  dextrose 5%. 1000 milliLiter(s) (50 mL/Hr) IV Continuous <Continuous>  dextrose 50% Injectable 12.5 Gram(s) IV Push once  dextrose 50% Injectable 25 Gram(s) IV Push once  dextrose 50% Injectable 25 Gram(s) IV Push once  ethambutol 800 milliGRAM(s) Oral daily  hydrocortisone sodium succinate Injectable 50 milliGRAM(s) IV Push every 6 hours  metroNIDAZOLE  IVPB      metroNIDAZOLE  IVPB 500 milliGRAM(s) IV Intermittent every 8 hours  norepinephrine Infusion 0.02 MICROgram(s)/kG/Min (2.177 mL/Hr) IV Continuous <Continuous>  nystatin Cream 1 Application(s) Topical two times a day  pantoprazole  Injectable 40 milliGRAM(s) IV Push every 12 hours  rifabutin 300 milliGRAM(s) Oral daily  sodium bicarbonate  Infusion 0.388 mEq/kG/Hr (150 mL/Hr) IV Continuous <Continuous>  thiamine IVPB 200 milliGRAM(s) IV Intermittent two times a day  trimethoprim / sulfamethoxazole IVPB 320 milliGRAM(s) IV Intermittent every 8 hours  vancomycin  IVPB 1000 milliGRAM(s) IV Intermittent every 24 hours    MEDICATIONS  (PRN):  dextrose Gel 1 Dose(s) Oral once PRN Blood Glucose LESS THAN 70 milliGRAM(s)/deciliter  dextrose Gel 1 Dose(s) Oral once PRN Blood Glucose LESS THAN 70 milliGRAM(s)/deciliter  glucagon  Injectable 1 milliGRAM(s) IntraMuscular once PRN Glucose LESS THAN 70 milligrams/deciliter      PHYSICAL EXAM:    Vital Signs Last 24 Hrs  T(C): 37.4 (2018 11:00), Max: 37.9 (2018 20:00)  T(F): 99.3 (2018 11:00), Max: 100.3 (2018 20:00)  HR: 123 (2018 12:06) (99 - 143)  BP: 98/53 (2018 10:45) (61/39 - 169/68)  BP(mean): 71 (2018 10:45) (38 - 98)  RR: 37 (2018 11:00) (24 - 42)  SpO2: 97% (2018 12:06) (78% - 100%)    General: alert  oriented x ____ lethargic agitated                  cachexia  nonverbal  coma    Karnofsky:  %    HEENT: normal  dry mouth  ET tube/trach    Lungs: comfortable tachypnea/labored breathing  excessive secretions    CV: normal  tachycardia    GI: normal  distended  tender  no BS               PEG/NG/OG tube  constipation  last BM:     : normal  incontinent  oliguria/anuria  almodovar    MSK: normal  weakness  edema             ambulatory  bedbound/wheelchair bound    Skin: normal  pressure ulcers- Stage_____  no rash    LABS:                        5.5    15.6  )-----------( 122      ( 2018 07:43 )             17.3     01-    137  |  95<L>  |  45.0<H>  ----------------------------<  86  5.3   |  20.0<L>  |  1.26    Ca    8.6      2018 07:43  Phos  7.2       Mg     2.2         TPro  3.9<L>  /  Alb  1.7<L>  /  TBili  2.4<H>  /  DBili  x   /  AST  1654<H>  /  ALT  161<H>  /  AlkPhos  356<H>      PT/INR - ( 2018 07:14 )   PT: 19.6 sec;   INR: 1.76 ratio         PTT - ( 2018 07:14 )  PTT:40.5 sec  Urinalysis Basic - ( 2018 11:33 )    Color: Yellow / Appearance: Clear / S.015 / pH: x  Gluc: x / Ketone: Trace  / Bili: Moderate / Urobili: 1 mg/dL   Blood: x / Protein: 30 mg/dL / Nitrite: Negative   Leuk Esterase: Trace / RBC: 0-2 /HPF / WBC 10-15 /HPF   Sq Epi: x / Non Sq Epi: Moderate / Bacteria: Few      I&O's Summary    2018 07:  -  2018 07:00  --------------------------------------------------------  IN: 4134 mL / OUT: 285 mL / NET: 3849 mL    2018 07:  -  2018 12:38  --------------------------------------------------------  IN: 1280.9 mL / OUT: 45 mL / NET: 1235.9 mL        RADIOLOGY & ADDITIONAL STUDIES:    ADVANCE DIRECTIVES:   DNR YES NO  Completed on:                     MOLST  YES NO   Completed on:  Living Will  YES NO   Completed on: HPI: 67F with PMH as listed admitted with fever, diarrhea, abdominal pain, diagnosed with norovirus, recently diagnosed with HIV. Yesterday developed worsening hypoxic respiratory failure, hypotension/shock requiring transfer to the ICU, and eventually intubated.  She is currently in multiorgan failure, in shock on pressors, with very guarded outlook.     PERTINENT PMH REVIEWED: Yes     PAST MEDICAL & SURGICAL HISTORY:  Anemia  Sleep apnea  Myocardial infarction  GERD (gastroesophageal reflux disease)  Dyslipidemia  Hypertension  Diabetes mellitus  H/O right knee surgery:   S/P cataract extraction: B/L  S/P partial thyroidectomy  S/P tubal ligation: s/p cyst removal of left axilla  S/P cholecystectomy    SOCIAL HISTORY:  nonsmoker                                    Admitted from:  home     Surrogate - 8 children - family has assigned daughter Lala Carrillo     FAMILY HISTORY:  No pertinent family history in first degree relatives    Baseline ADLs (prior to admission):  Independent     Allergies    No Known Allergies    Present Symptoms:     Dyspnea: vented   Nausea/Vomiting: No  Anxiety:  unable   Depression: unable   Fatigue: Yes   Loss of appetite: unable     Pain: none             Character-            Duration-            Effect-            Factors-            Frequency-            Location-            Severity-    Review of Systems: Reviewed                    Unable to obtain due to poor mentation   All others negative    MEDICATIONS  (STANDING):  ascorbic acid IVPB 1500 milliGRAM(s) IV Intermittent every 6 hours  cefepime  IVPB 2000 milliGRAM(s) IV Intermittent every 24 hours  chlorhexidine 0.12% Liquid 15 milliLiter(s) Swish and Spit every 12 hours  clotrimazole Lozenge 1 Lozenge Oral five times a day  dexmedetomidine Infusion 0.5 MICROgram(s)/kG/Hr (7.258 mL/Hr) IV Continuous <Continuous>  dextrose 5%. 1000 milliLiter(s) (50 mL/Hr) IV Continuous <Continuous>  dextrose 50% Injectable 12.5 Gram(s) IV Push once  dextrose 50% Injectable 25 Gram(s) IV Push once  dextrose 50% Injectable 25 Gram(s) IV Push once  ethambutol 800 milliGRAM(s) Oral daily  hydrocortisone sodium succinate Injectable 50 milliGRAM(s) IV Push every 6 hours  metroNIDAZOLE  IVPB      metroNIDAZOLE  IVPB 500 milliGRAM(s) IV Intermittent every 8 hours  norepinephrine Infusion 0.02 MICROgram(s)/kG/Min (2.177 mL/Hr) IV Continuous <Continuous>  nystatin Cream 1 Application(s) Topical two times a day  pantoprazole  Injectable 40 milliGRAM(s) IV Push every 12 hours  rifabutin 300 milliGRAM(s) Oral daily  sodium bicarbonate  Infusion 0.388 mEq/kG/Hr (150 mL/Hr) IV Continuous <Continuous>  thiamine IVPB 200 milliGRAM(s) IV Intermittent two times a day  trimethoprim / sulfamethoxazole IVPB 320 milliGRAM(s) IV Intermittent every 8 hours  vancomycin  IVPB 1000 milliGRAM(s) IV Intermittent every 24 hours    MEDICATIONS  (PRN):  dextrose Gel 1 Dose(s) Oral once PRN Blood Glucose LESS THAN 70 milliGRAM(s)/deciliter  dextrose Gel 1 Dose(s) Oral once PRN Blood Glucose LESS THAN 70 milliGRAM(s)/deciliter  glucagon  Injectable 1 milliGRAM(s) IntraMuscular once PRN Glucose LESS THAN 70 milligrams/deciliter    PHYSICAL EXAM:    Vital Signs Last 24 Hrs  T(C): 37.4 (2018 11:00), Max: 37.9 (2018 20:00)  T(F): 99.3 (2018 11:00), Max: 100.3 (2018 20:00)  HR: 123 (2018 12:06) (99 - 143)  BP: 98/53 (2018 10:45) (61/39 - 169/68)  BP(mean): 71 (2018 10:45) (38 - 98)  RR: 37 (2018 11:00) (24 - 42)  SpO2: 97% (2018 12:06) (78% - 100%)    General: intubated, sedated     Karnofsky:  30 %    HEENT: Et tube     Lungs: comfortable    CV: normal      GI: normal      : normal     MSK: weakness      Skin: no rash    LABS:                      5.5    15.6  )-----------( 122      ( 2018 07:43 )             17.3     01-17    137  |  95<L>  |  45.0<H>  ----------------------------<  86  5.3   |  20.0<L>  |  1.26    Ca    8.6      2018 07:43  Phos  7.2       Mg     2.2     01-17    TPro  3.9<L>  /  Alb  1.7<L>  /  TBili  2.4<H>  /  DBili  x   /  AST  1654<H>  /  ALT  161<H>  /  AlkPhos  356<H>      PT/INR - ( 2018 07:14 )   PT: 19.6 sec;   INR: 1.76 ratio       PTT - ( 2018 07:14 )  PTT:40.5 sec  Urinalysis Basic - ( 2018 11:33 )    Color: Yellow / Appearance: Clear / S.015 / pH: x  Gluc: x / Ketone: Trace  / Bili: Moderate / Urobili: 1 mg/dL   Blood: x / Protein: 30 mg/dL / Nitrite: Negative   Leuk Esterase: Trace / RBC: 0-2 /HPF / WBC 10-15 /HPF   Sq Epi: x / Non Sq Epi: Moderate / Bacteria: Few    I&O's Summary    2018 07:  -  2018 07:00  --------------------------------------------------------  IN: 4134 mL / OUT: 285 mL / NET: 3849 mL    2018 07:  -  2018 12:38  --------------------------------------------------------  IN: 1280.9 mL / OUT: 45 mL / NET: 1235.9 mL    RADIOLOGY & ADDITIONAL STUDIES:    ADVANCE DIRECTIVES: Full Code - but waiting for one other daughter, then likely will be DNR

## 2018-01-17 NOTE — PROCEDURE NOTE - NSSITEPREP_SKIN_A_CORE
chlorhexidine
Adherence to aseptic technique: hand hygiene prior to donning barriers (gown, gloves), don cap and mask, sterile drape over patient/chlorhexidine
chlorhexidine/Adherence to aseptic technique: hand hygiene prior to donning barriers (gown, gloves), don cap and mask, sterile drape over patient

## 2018-01-17 NOTE — CONSULT NOTE ADULT - PROBLEM SELECTOR RECOMMENDATION 4
CD4 count resulted today as 5  ID to add prophylaxis for PCP, ABx
-met with 2 daughters and a son at bedside, there are 8 total children. Dr. Mcbride explained the overall critical condition, and that currently she is on maximal therapy but there is a chance that she may not survive this hospitalization. Family understands. We also discussed that should her heart stop despite our maximal therapies, it is unlikely that CPR will benefit. These children agreed, but we are waiting for other daughter to come in to confirm with her as well.
on Clotrimazole

## 2018-01-17 NOTE — CONSULT NOTE ADULT - ASSESSMENT
67F with HIV, multiorgan failure, respiratory failure, shock on pressors, with poor overall prognosis.

## 2018-01-17 NOTE — CONSULT NOTE ADULT - ATTENDING COMMENTS
Thank you for the opportunity to assist with the care of this patient.   [facility] Consult [facility] [phone].
Pt seen by [facility] yesterday evening. I have been asked to re-evaluate this patient this morning at 840am for continued low BP, fevers and now hypoglycemia. 67 F with new Dx of AIDS with norovirus diarrhea now worsening septic picture possibly PNA, there is concern of opportunistic infection and worsening organ failure. Will take to ICU for volume resus, hourly accuchecks,  hemodynamic support and further infectious work up.
Will follow
I saw this patient independently and agree with the above with the following points:    1. She has had progressive diarrhea, norovirus positive.  This had been persistent and otherwise negative workup, but AFB pending.    2. The cholangiopathy, if AIDS related, could be treated with antiretrovirals and improve, but is unlikely to be the etiology of this acute decompensation.    3. No platelet/FFP transfusion necessary in the presence of no hemorrhage if no procedure necessary.    4. Her acute decompensation could be explained by an occult infection.  A CD4 count of 5 is troublesome, and in this setting, an abscess could be the cause of an acute decompensation.  Added IV contrast to her CT scan; I discussed with her daughter over the telephone and obtained consent for this.    5. The possibility of MAC infection (manifested by diarrhea and now potentially bacteremia) is in the differential diagnosis -- and given the difficulty of growing this organism in vitro, we may not have an answer for weeks.  In discussion with UYEN Blanco, a decision was made to empirically treat for disseminated MAC while awaiting results.    6. Her clinical status continues to deteriorate; it is likely that she will require mechanical ventilation and vasoactive agents.  Will empirically administer the "Marik protocol" hydrocortisone/thiamine/vitamin c given her declining clinical status and concern for occult infection driving this process.  Her elevating lactic acid, though likely to be a "type B lactic acidosis" in this setting of AIDS, nevertheless portends a guarded prognosis.  There may be a combination of cellular dysoxia combined with a type B process.    7. Updated daughter on phone, and family at bedside.    She is clearly at risk for further decompensation.

## 2018-01-17 NOTE — CHART NOTE - NSCHARTNOTEFT_GEN_A_CORE
Met with family again, other sisters have now arrived. We again explained poor prognosis. They will be calling rest of important family members to come see her because we have seen her continue to worsen throughout the day and  she may not survive the night. Family has all agreed that should she worsen, we should not perform CPR. For now continue all other aggressive treatments but should she have a cardiac arrest, DNR in place.

## 2018-01-17 NOTE — CONSULT NOTE ADULT - PROBLEM SELECTOR PROBLEM 1
Diarrhea of presumed infectious origin
Sepsis, due to unspecified organism
Acute respiratory failure with hypoxia
Septic shock

## 2018-01-17 NOTE — CONSULT NOTE ADULT - PROBLEM SELECTOR RECOMMENDATION 3
- poor overall prognosis, workup in progress, being treated with broad spectrum coverage while workup in progress.
Unable to discuss HIV test with no privacy in the ER  Will discuss once patient has a medical bed and more privacy  Check HIV Viral Load
s/p MRCP  GI following   r/o cholangiopathy related to HIV

## 2018-01-17 NOTE — PROGRESS NOTE ADULT - PROBLEM SELECTOR PLAN 2
CT Chest with Bilateral pulmonary infiltrates  CD4 count of 5  Obtain sputum for PJP  Continue treatment for PJP with Bactrim, switch to suspension

## 2018-01-17 NOTE — PROCEDURE NOTE - NSPROCDETAILS_GEN_ALL_CORE
secured in place/sterile technique, catheter placed/ultrasound utilization/dressing applied/flushes easily/blood seen on insertion/location identified, draped/prepped, sterile technique used
sterile dressing applied/sterile technique, catheter placed/ultrasound guidance/lumen(s) aspirated and flushed/guidewire recovered
positive blood return obtained via catheter/hemostasis with direct pressure, dressing applied/Seldinger technique/sutured in place/all materials/supplies accounted for at end of procedure/location identified, draped/prepped, sterile technique used, needle inserted/introduced/connected to a pressurized flush line

## 2018-01-17 NOTE — PROGRESS NOTE ADULT - SUBJECTIVE AND OBJECTIVE BOX
[facility]  INFECTIOUS DISEASES AND INTERNAL MEDICINE at [place]  =======================================================  Raymon Elliott MD [facility]   Kit Ortiz MD  Diplomates American Board of Internal Medicine and Infectious Diseases  =======================================================    CLOUD, SARA [id number]    Follow up: Diarrhea, Sepsis, respiratory failure    afebrile    s/p intubation 18    Allergies:  No Known Allergies      Antibiotics:  azithromycin   Tablet 600 milliGRAM(s) Oral daily  cefepime  IVPB 2000 milliGRAM(s) IV Intermittent every 24 hours  ethambutol 800 milliGRAM(s) Oral daily    metroNIDAZOLE  IVPB 500 milliGRAM(s) IV Intermittent every 8 hours  rifabutin 300 milliGRAM(s) Oral daily  trimethoprim  160 mG/sulfamethoxazole 800 mG 2 Tablet(s) Oral every 8 hours      REVIEW OF SYSTEMS:  Unable to obtain, patient is sedated      Physical Exam:  ICU Vital Signs Last 24 Hrs  T(C): 37 (2018 09:00), Max: 37.9 (2018 20:00)  T(F): 98.6 (2018 09:00), Max: 100.3 (2018 20:00)  HR: 127 (2018 09:30) (99 - 143)  BP: 96/51 (2018 09:30) (61/39 - 169/68)  BP(mean): 67 (2018 09:30) (46 - 98)  RR: 39 (2018 09:30) (22 - 39)  SpO2: 99% (2018 09:30) (78% - 100%)        GEN: sedated  HEENT: normocephalic and atraumatic. +ETT.    NECK: Supple.   LUNGS: Course BS B/L  HEART: Regular rate and rhythm  ABDOMEN: Soft, Mild tenderness to deep palpation, and nondistended.  Positive bowel sounds.    : + Daniels  EXTREMITIES: Without any edema.  MSK: No joint swelling  NEUROLOGIC: Sedated  PSYCHIATRIC: unable to access   SKIN: No rash      Labs:      137  |  95<L>  |  45.0<H>  ----------------------------<  86  5.3   |  20.0<L>  |  1.26    Ca    8.6      2018 07:43  Phos  7.2       Mg     2.2         TPro  3.9<L>  /  Alb  1.7<L>  /  TBili  2.4<H>  /  DBili  x   /  AST  1654<H>  /  ALT  161<H>  /  AlkPhos  356<H>                 5.5    15.6  )-----------( 122      ( 2018 07:43 )             17.3       PT/INR - ( 2018 07:14 )   PT: 19.6 sec;   INR: 1.76 ratio         PTT - ( 2018 07:14 )  PTT:40.5 sec  Urinalysis Basic - ( 2018 11:33 )    Color: Yellow / Appearance: Clear / S.015 / pH: x  Gluc: x / Ketone: Trace  / Bili: Moderate / Urobili: 1 mg/dL   Blood: x / Protein: 30 mg/dL / Nitrite: Negative   Leuk Esterase: Trace / RBC: 0-2 /HPF / WBC 10-15 /HPF   Sq Epi: x / Non Sq Epi: Moderate / Bacteria: Few      LIVER FUNCTIONS - ( 2018 07:43 )  Alb: 1.7 g/dL / Pro: 3.9 g/dL / ALK PHOS: 356 U/L / ALT: 161 U/L / AST: 1654 U/L / GGT: x             ABG - ( 2018 08:40 )  pH: 7.18  /  pCO2: 54    /  pO2: 88    / HCO3: 18    / Base Excess: -7.7  /  SaO2: 96            RECENT CULTURES:   @ 16:21 .Stool Feces     Testing in progress       @ 10:38    [facility]       @ 22:08 .Blood Blood-Peripheral     No growth at 48 hours       @ 07:30 .Stool Stool     No Cryptosporidium, Cyclospora sp. or Isospora found by modified acid  fast stain       @ 22:51 .Stool Feces     No Protozoa seen by trichrome stain  No Helminths or Protozoa seen in formalin concentrate  performed by iodine stain  (routine O+P not evaluated for Microsporidia,  Cryptosporidia, Cyclospora, or Isospora.)  Note: One negative specimen does not rule  out the possibility of a parasitic infection.       19:48 .Stool Feces     No Protozoa seen by trichrome stain  No Helminths or Protozoa seen in formalin concentrate  performed by iodine stain  (routine O+P not evaluated for Microsporidia,  Cryptosporidia, Cyclospora, or Isospora.)  Note: One negative specimen does not rule  out the possibility of a parasitic infection.       @ 08:31 .Stool Feces     Norovirus GI/GII  DETECTED by PCR  *******Please Note:*******  GI panel PCR evaluates for:  Campylobacter, Plesiomonas shigelloides, Salmonella,  Vibrio, Yersinia enterocolitica, Enteroaggregative  Escherichia coli (EAEC), Enteropathogenic E.coli (EPEC),  Enterotoxigenic E. coli (ETEC) lt/st, Shiga-like  toxin-producing E. coli (STEC) stx1/stx2,  Shigella/ Enteroinvasive E. coli (EIEC), Cryptosporidium,  Cyclospora cayetanensis, Entamoeba histolytica,  Giardia lamblia, Adenovirus F 40/41, Astrovirus,  Norovirus GI/GII, Rotavirus A, Sapovirus      01-10 @ 02:43 .Urine Catheterized     No growth      01-10 @ 02:16 .Blood Arterial Catheter     No growth at 5 days.      01-10 @ 01:49 .Blood Blood     No growth at 5 days.       @ 18:36 .Stool Feces     No Protozoa seen by trichrome stain  No Helminths or Protozoa seen in formalin concentrate  performed by iodine stain  (routine O+P not evaluated for Microsporidia,  Cryptosporidia, Cyclospora, or Isospora.)  Note: One negative specimen does not rule  out the possibility of a parasitic infection.       @ 11:01 .Stool Feces     No enteric gram negative rods isolated  No enteric pathogens isolated.  (Stool culture examined for Salmonella,  Shigella, Campylobacter, Aeromonas, Plesiomonas,  Vibrio, E.coli O157 and Yersinia)       @ 19:42 .Blood Blood     No growth at 5 days.       @ 19:41 .Blood Blood     No growth at 5 days.       @ 17:48 .Stool Feces     No enteric pathogens isolated.  (Stool culture examined for Salmonella,  Shigella, Campylobacter, Aeromonas, Plesiomonas,  Vibrio, E.coli O157 and Yersinia)       @ 16:02 .Blood Blood     No growth at 5 days.       @ 16:01 .Blood Blood     No growth at 5 days.        EXAM:  CT ABDOMEN AND PELVIS IC                         EXAM:  CT CHEST IC                        PROCEDURE DATE:  2018    INTERPRETATION:  HISTORY:  Septic shock. .  Date/Time of exam: 2018 4:28 PM  TECHNIQUE:  Sections were obtained from the lung apices to the symphysis   pubis without oral or intravenous contrast.     COMPARISON EXAMINATION:     2018.  FINDINGS:    No evidence of mediastinal or hilar lymphadenopathy. Small right pleural   effusion. No evidence of a left pleural effusion. No evidence of a   pericardial effusion. No evidence of axillary adenopathy.  Status post left hemithyroidectomy.  Bilateral groundglass infiltrates in both upper lobes, right side worse   than left. Less pronounced inflammatory changes noted in the lower lobes bilaterally.  There is a calcification in the left lobe of the liver, unchanged and   2018. Pattern megaly. Status post cholecystectomy.  The spleen is not enlarged and demonstrates no focal abnormality. The   pancreatic contour is unremarkable without evidence of mass, inflammation   or ductal dilatation. The adrenal glands demonstrate normal size and contour.  The kidneys reveal a normal unenhanced morphology without evidence of   stone or hydronephrosis.  No evidence of retroperitoneal or pelvic lymphadenopathy.  The bladder is unremarkable. The uterus and adnexal structures   demonstrate no abnormality.  There is a small amount of ascites layering in the pelvis.  No intestinal abnormality is identified on this study performed without contrast.  Degenerative changes in the spine.  IMPRESSION:   Bilateral pulmonary infiltrates most pronounced in the upper lobes, right   side greater than left.  Small right pleural effusion.  Hepatomegaly  *** ADDENDUM 2018  ***  Addendum:  Subsequent to the noncontrast CT scan, a full post IV contrast CT scan of   the chest abdomen and pelvis was performed. 95 cc of Omnipaque 300 administered.  These images demonstrate no additional findings.  *** END OF ADDENDUM 2018  ***

## 2018-01-17 NOTE — CONSULT NOTE ADULT - PROBLEM SELECTOR RECOMMENDATION 2
Echinococcus serology ordered
-on pressors
Norovirus positive  other stool cultures O&P negative  ID following

## 2018-01-18 VITALS — OXYGEN SATURATION: 74 %

## 2018-01-18 LAB
-  AMPICILLIN/SULBACTAM: SIGNIFICANT CHANGE UP
-  AMPICILLIN/SULBACTAM: SIGNIFICANT CHANGE UP
-  CEFAZOLIN: SIGNIFICANT CHANGE UP
-  CEFAZOLIN: SIGNIFICANT CHANGE UP
-  CIPROFLOXACIN: SIGNIFICANT CHANGE UP
-  CIPROFLOXACIN: SIGNIFICANT CHANGE UP
-  CLINDAMYCIN: SIGNIFICANT CHANGE UP
-  CLINDAMYCIN: SIGNIFICANT CHANGE UP
-  ERYTHROMYCIN: SIGNIFICANT CHANGE UP
-  ERYTHROMYCIN: SIGNIFICANT CHANGE UP
-  GENTAMICIN: SIGNIFICANT CHANGE UP
-  GENTAMICIN: SIGNIFICANT CHANGE UP
-  LEVOFLOXACIN: SIGNIFICANT CHANGE UP
-  LEVOFLOXACIN: SIGNIFICANT CHANGE UP
-  MOXIFLOXACIN(AEROBIC): SIGNIFICANT CHANGE UP
-  MOXIFLOXACIN(AEROBIC): SIGNIFICANT CHANGE UP
-  OXACILLIN: SIGNIFICANT CHANGE UP
-  OXACILLIN: SIGNIFICANT CHANGE UP
-  PENICILLIN: SIGNIFICANT CHANGE UP
-  PENICILLIN: SIGNIFICANT CHANGE UP
-  RIFAMPIN: SIGNIFICANT CHANGE UP
-  RIFAMPIN: SIGNIFICANT CHANGE UP
-  TETRACYCLINE: SIGNIFICANT CHANGE UP
-  TETRACYCLINE: SIGNIFICANT CHANGE UP
-  TRIMETHOPRIM/SULFAMETHOXAZOLE: SIGNIFICANT CHANGE UP
-  TRIMETHOPRIM/SULFAMETHOXAZOLE: SIGNIFICANT CHANGE UP
-  VANCOMYCIN: SIGNIFICANT CHANGE UP
-  VANCOMYCIN: SIGNIFICANT CHANGE UP
ALBUMIN SERPL ELPH-MCNC: 1.3 G/DL — LOW (ref 3.3–5.2)
ALP SERPL-CCNC: 733 U/L — HIGH (ref 40–120)
ALT FLD-CCNC: 501 U/L — HIGH
ANION GAP SERPL CALC-SCNC: 30 MMOL/L — HIGH (ref 5–17)
AST SERPL-CCNC: 6807 U/L — HIGH
BILIRUB SERPL-MCNC: 3.5 MG/DL — HIGH (ref 0.4–2)
BUN SERPL-MCNC: 50 MG/DL — HIGH (ref 8–20)
CALCIUM SERPL-MCNC: 7.3 MG/DL — LOW (ref 8.6–10.2)
CHLORIDE SERPL-SCNC: 88 MMOL/L — LOW (ref 98–107)
CO2 SERPL-SCNC: 18 MMOL/L — LOW (ref 22–29)
CREAT SERPL-MCNC: 1.46 MG/DL — HIGH (ref 0.5–1.3)
CRYPTOC AG FLD QL: NEGATIVE — SIGNIFICANT CHANGE UP
CULTURE RESULTS: SIGNIFICANT CHANGE UP
GAS PNL BLDA: SIGNIFICANT CHANGE UP
GLUCOSE SERPL-MCNC: 221 MG/DL — HIGH (ref 70–115)
HCT VFR BLD CALC: 23.1 % — LOW (ref 37–47)
HGB BLD-MCNC: 7.5 G/DL — LOW (ref 12–16)
MCHC RBC-ENTMCNC: 27.2 PG — SIGNIFICANT CHANGE UP (ref 27–31)
MCHC RBC-ENTMCNC: 32.5 G/DL — SIGNIFICANT CHANGE UP (ref 32–36)
MCV RBC AUTO: 83.7 FL — SIGNIFICANT CHANGE UP (ref 81–99)
METHOD TYPE: SIGNIFICANT CHANGE UP
METHOD TYPE: SIGNIFICANT CHANGE UP
NIGHT BLUE STAIN TISS: SIGNIFICANT CHANGE UP
ORGANISM # SPEC MICROSCOPIC CNT: SIGNIFICANT CHANGE UP
PLATELET # BLD AUTO: 292 K/UL — SIGNIFICANT CHANGE UP (ref 150–400)
POTASSIUM SERPL-MCNC: 5.2 MMOL/L — SIGNIFICANT CHANGE UP (ref 3.5–5.3)
POTASSIUM SERPL-SCNC: 5.2 MMOL/L — SIGNIFICANT CHANGE UP (ref 3.5–5.3)
PROT SERPL-MCNC: 3.6 G/DL — LOW (ref 6.6–8.7)
RBC # BLD: 2.76 M/UL — LOW (ref 4.4–5.2)
RBC # FLD: 18 % — HIGH (ref 11–15.6)
SODIUM SERPL-SCNC: 136 MMOL/L — SIGNIFICANT CHANGE UP (ref 135–145)
SPECIMEN SOURCE: SIGNIFICANT CHANGE UP
VANCOMYCIN TROUGH SERPL-MCNC: 11.1 UG/ML — SIGNIFICANT CHANGE UP (ref 10–20)
WBC # BLD: 24.8 K/UL — HIGH (ref 4.8–10.8)
WBC # FLD AUTO: 24.8 K/UL — HIGH (ref 4.8–10.8)

## 2018-01-18 PROCEDURE — 85027 COMPLETE CBC AUTOMATED: CPT

## 2018-01-18 PROCEDURE — 87116 MYCOBACTERIA CULTURE: CPT

## 2018-01-18 PROCEDURE — 85384 FIBRINOGEN ACTIVITY: CPT

## 2018-01-18 PROCEDURE — 71275 CT ANGIOGRAPHY CHEST: CPT

## 2018-01-18 PROCEDURE — 86334 IMMUNOFIX E-PHORESIS SERUM: CPT

## 2018-01-18 PROCEDURE — 81001 URINALYSIS AUTO W/SCOPE: CPT

## 2018-01-18 PROCEDURE — 82550 ASSAY OF CK (CPK): CPT

## 2018-01-18 PROCEDURE — 87206 SMEAR FLUORESCENT/ACID STAI: CPT

## 2018-01-18 PROCEDURE — 80061 LIPID PANEL: CPT

## 2018-01-18 PROCEDURE — 86664 EPSTEIN-BARR NUCLEAR ANTIGEN: CPT

## 2018-01-18 PROCEDURE — 82435 ASSAY OF BLOOD CHLORIDE: CPT

## 2018-01-18 PROCEDURE — 82947 ASSAY GLUCOSE BLOOD QUANT: CPT

## 2018-01-18 PROCEDURE — 87493 C DIFF AMPLIFIED PROBE: CPT

## 2018-01-18 PROCEDURE — 86403 PARTICLE AGGLUT ANTBDY SCRN: CPT

## 2018-01-18 PROCEDURE — 86645 CMV ANTIBODY IGM: CPT

## 2018-01-18 PROCEDURE — 84155 ASSAY OF PROTEIN SERUM: CPT

## 2018-01-18 PROCEDURE — 84165 PROTEIN E-PHORESIS SERUM: CPT

## 2018-01-18 PROCEDURE — 86920 COMPATIBILITY TEST SPIN: CPT

## 2018-01-18 PROCEDURE — 71260 CT THORAX DX C+: CPT

## 2018-01-18 PROCEDURE — 99285 EMERGENCY DEPT VISIT HI MDM: CPT | Mod: 25

## 2018-01-18 PROCEDURE — 36600 WITHDRAWAL OF ARTERIAL BLOOD: CPT

## 2018-01-18 PROCEDURE — 82728 ASSAY OF FERRITIN: CPT

## 2018-01-18 PROCEDURE — 84132 ASSAY OF SERUM POTASSIUM: CPT

## 2018-01-18 PROCEDURE — 71045 X-RAY EXAM CHEST 1 VIEW: CPT

## 2018-01-18 PROCEDURE — 87581 M.PNEUMON DNA AMP PROBE: CPT

## 2018-01-18 PROCEDURE — P9059: CPT

## 2018-01-18 PROCEDURE — 85014 HEMATOCRIT: CPT

## 2018-01-18 PROCEDURE — 87633 RESP VIRUS 12-25 TARGETS: CPT

## 2018-01-18 PROCEDURE — 82803 BLOOD GASES ANY COMBINATION: CPT

## 2018-01-18 PROCEDURE — 87150 DNA/RNA AMPLIFIED PROBE: CPT

## 2018-01-18 PROCEDURE — 31720 CLEARANCE OF AIRWAYS: CPT

## 2018-01-18 PROCEDURE — 93005 ELECTROCARDIOGRAM TRACING: CPT

## 2018-01-18 PROCEDURE — 99233 SBSQ HOSP IP/OBS HIGH 50: CPT

## 2018-01-18 PROCEDURE — 82105 ALPHA-FETOPROTEIN SERUM: CPT

## 2018-01-18 PROCEDURE — 82330 ASSAY OF CALCIUM: CPT

## 2018-01-18 PROCEDURE — 86140 C-REACTIVE PROTEIN: CPT

## 2018-01-18 PROCEDURE — 82390 ASSAY OF CERULOPLASMIN: CPT

## 2018-01-18 PROCEDURE — 36430 TRANSFUSION BLD/BLD COMPNT: CPT

## 2018-01-18 PROCEDURE — 87186 SC STD MICRODIL/AGAR DIL: CPT

## 2018-01-18 PROCEDURE — 84100 ASSAY OF PHOSPHORUS: CPT

## 2018-01-18 PROCEDURE — 74177 CT ABD & PELVIS W/CONTRAST: CPT

## 2018-01-18 PROCEDURE — 94002 VENT MGMT INPAT INIT DAY: CPT

## 2018-01-18 PROCEDURE — 96374 THER/PROPH/DIAG INJ IV PUSH: CPT | Mod: XU

## 2018-01-18 PROCEDURE — 86357 NK CELLS TOTAL COUNT: CPT

## 2018-01-18 PROCEDURE — 74182 MRI ABDOMEN W/CONTRAST: CPT

## 2018-01-18 PROCEDURE — P9037: CPT

## 2018-01-18 PROCEDURE — 87070 CULTURE OTHR SPECIMN AEROBIC: CPT

## 2018-01-18 PROCEDURE — 86038 ANTINUCLEAR ANTIBODIES: CPT

## 2018-01-18 PROCEDURE — 84145 PROCALCITONIN (PCT): CPT

## 2018-01-18 PROCEDURE — 86355 B CELLS TOTAL COUNT: CPT

## 2018-01-18 PROCEDURE — 86901 BLOOD TYPING SEROLOGIC RH(D): CPT

## 2018-01-18 PROCEDURE — 87086 URINE CULTURE/COLONY COUNT: CPT

## 2018-01-18 PROCEDURE — 80202 ASSAY OF VANCOMYCIN: CPT

## 2018-01-18 PROCEDURE — 84484 ASSAY OF TROPONIN QUANT: CPT

## 2018-01-18 PROCEDURE — 94003 VENT MGMT INPAT SUBQ DAY: CPT

## 2018-01-18 PROCEDURE — 86381 MITOCHONDRIAL ANTIBODY EACH: CPT

## 2018-01-18 PROCEDURE — 80053 COMPREHEN METABOLIC PANEL: CPT

## 2018-01-18 PROCEDURE — 86703 HIV-1/HIV-2 1 RESULT ANTBDY: CPT

## 2018-01-18 PROCEDURE — 94640 AIRWAY INHALATION TREATMENT: CPT

## 2018-01-18 PROCEDURE — 82607 VITAMIN B-12: CPT

## 2018-01-18 PROCEDURE — 86695 HERPES SIMPLEX TYPE 1 TEST: CPT

## 2018-01-18 PROCEDURE — 85610 PROTHROMBIN TIME: CPT

## 2018-01-18 PROCEDURE — 83615 LACTATE (LD) (LDH) ENZYME: CPT

## 2018-01-18 PROCEDURE — 86480 TB TEST CELL IMMUN MEASURE: CPT

## 2018-01-18 PROCEDURE — 87045 FECES CULTURE AEROBIC BACT: CPT

## 2018-01-18 PROCEDURE — 87486 CHLMYD PNEUM DNA AMP PROBE: CPT

## 2018-01-18 PROCEDURE — 87040 BLOOD CULTURE FOR BACTERIA: CPT

## 2018-01-18 PROCEDURE — 86850 RBC ANTIBODY SCREEN: CPT

## 2018-01-18 PROCEDURE — 88112 CYTOPATH CELL ENHANCE TECH: CPT

## 2018-01-18 PROCEDURE — T1013: CPT

## 2018-01-18 PROCEDURE — 94770: CPT

## 2018-01-18 PROCEDURE — 87536 HIV-1 QUANT&REVRSE TRNSCRPJ: CPT

## 2018-01-18 PROCEDURE — 87177 OVA AND PARASITES SMEARS: CPT

## 2018-01-18 PROCEDURE — 82746 ASSAY OF FOLIC ACID SERUM: CPT

## 2018-01-18 PROCEDURE — 86702 HIV-2 ANTIBODY: CPT

## 2018-01-18 PROCEDURE — 87799 DETECT AGENT NOS DNA QUANT: CPT

## 2018-01-18 PROCEDURE — 82962 GLUCOSE BLOOD TEST: CPT

## 2018-01-18 PROCEDURE — P9016: CPT

## 2018-01-18 PROCEDURE — 83550 IRON BINDING TEST: CPT

## 2018-01-18 PROCEDURE — 86753 PROTOZOA ANTIBODY NOS: CPT

## 2018-01-18 PROCEDURE — 87015 SPECIMEN INFECT AGNT CONCNTJ: CPT

## 2018-01-18 PROCEDURE — 82272 OCCULT BLD FECES 1-3 TESTS: CPT

## 2018-01-18 PROCEDURE — 87507 IADNA-DNA/RNA PROBE TQ 12-25: CPT

## 2018-01-18 PROCEDURE — 83690 ASSAY OF LIPASE: CPT

## 2018-01-18 PROCEDURE — 86701 HIV-1ANTIBODY: CPT

## 2018-01-18 PROCEDURE — 83735 ASSAY OF MAGNESIUM: CPT

## 2018-01-18 PROCEDURE — 86900 BLOOD TYPING SEROLOGIC ABO: CPT

## 2018-01-18 PROCEDURE — 86665 EPSTEIN-BARR CAPSID VCA: CPT

## 2018-01-18 PROCEDURE — 86787 VARICELLA-ZOSTER ANTIBODY: CPT

## 2018-01-18 PROCEDURE — 85730 THROMBOPLASTIN TIME PARTIAL: CPT

## 2018-01-18 PROCEDURE — 36415 COLL VENOUS BLD VENIPUNCTURE: CPT

## 2018-01-18 PROCEDURE — 76705 ECHO EXAM OF ABDOMEN: CPT

## 2018-01-18 PROCEDURE — 82248 BILIRUBIN DIRECT: CPT

## 2018-01-18 PROCEDURE — 83036 HEMOGLOBIN GLYCOSYLATED A1C: CPT

## 2018-01-18 PROCEDURE — 86663 EPSTEIN-BARR ANTIBODY: CPT

## 2018-01-18 PROCEDURE — 93306 TTE W/DOPPLER COMPLETE: CPT

## 2018-01-18 PROCEDURE — 94660 CPAP INITIATION&MGMT: CPT

## 2018-01-18 PROCEDURE — 83605 ASSAY OF LACTIC ACID: CPT

## 2018-01-18 PROCEDURE — 87046 STOOL CULTR AEROBIC BACT EA: CPT

## 2018-01-18 PROCEDURE — 87329 GIARDIA AG IA: CPT

## 2018-01-18 PROCEDURE — 84466 ASSAY OF TRANSFERRIN: CPT

## 2018-01-18 PROCEDURE — 87389 HIV-1 AG W/HIV-1&-2 AB AG IA: CPT

## 2018-01-18 PROCEDURE — 84295 ASSAY OF SERUM SODIUM: CPT

## 2018-01-18 PROCEDURE — 87798 DETECT AGENT NOS DNA AMP: CPT

## 2018-01-18 PROCEDURE — 99232 SBSQ HOSP IP/OBS MODERATE 35: CPT

## 2018-01-18 PROCEDURE — 86682 HELMINTH ANTIBODY: CPT

## 2018-01-18 PROCEDURE — 99291 CRITICAL CARE FIRST HOUR: CPT

## 2018-01-18 PROCEDURE — 87106 FUNGI IDENTIFICATION YEAST: CPT

## 2018-01-18 PROCEDURE — 86255 FLUORESCENT ANTIBODY SCREEN: CPT

## 2018-01-18 RX ORDER — HYDROMORPHONE HYDROCHLORIDE 2 MG/ML
0.5 INJECTION INTRAMUSCULAR; INTRAVENOUS; SUBCUTANEOUS
Qty: 0 | Refills: 0 | Status: DISCONTINUED | OUTPATIENT
Start: 2018-01-18 | End: 2018-01-18

## 2018-01-18 RX ORDER — HYDROMORPHONE HYDROCHLORIDE 2 MG/ML
0.5 INJECTION INTRAMUSCULAR; INTRAVENOUS; SUBCUTANEOUS EVERY 6 HOURS
Qty: 0 | Refills: 0 | Status: DISCONTINUED | OUTPATIENT
Start: 2018-01-18 | End: 2018-01-18

## 2018-01-18 RX ORDER — METRONIDAZOLE 500 MG
500 TABLET ORAL EVERY 12 HOURS
Qty: 0 | Refills: 0 | Status: DISCONTINUED | OUTPATIENT
Start: 2018-01-18 | End: 2018-01-18

## 2018-01-18 RX ORDER — VASOPRESSIN 20 [USP'U]/ML
0.04 INJECTION INTRAVENOUS
Qty: 100 | Refills: 0 | Status: DISCONTINUED | OUTPATIENT
Start: 2018-01-18 | End: 2018-01-18

## 2018-01-18 RX ORDER — MORPHINE SULFATE 50 MG/1
5 CAPSULE, EXTENDED RELEASE ORAL
Qty: 100 | Refills: 0 | Status: DISCONTINUED | OUTPATIENT
Start: 2018-01-18 | End: 2018-01-18

## 2018-01-18 RX ADMIN — Medication 100 MILLIGRAM(S): at 17:29

## 2018-01-18 RX ADMIN — Medication 52 MILLIGRAM(S): at 05:53

## 2018-01-18 RX ADMIN — PANTOPRAZOLE SODIUM 40 MILLIGRAM(S): 20 TABLET, DELAYED RELEASE ORAL at 05:52

## 2018-01-18 RX ADMIN — DEXMEDETOMIDINE HYDROCHLORIDE IN 0.9% SODIUM CHLORIDE 7.26 MICROGRAM(S)/KG/HR: 4 INJECTION INTRAVENOUS at 20:45

## 2018-01-18 RX ADMIN — Medication 50 MILLIGRAM(S): at 00:04

## 2018-01-18 RX ADMIN — Medication 100 MILLIGRAM(S): at 02:36

## 2018-01-18 RX ADMIN — Medication 520 MILLIGRAM(S): at 05:45

## 2018-01-18 RX ADMIN — HYDROMORPHONE HYDROCHLORIDE 0.5 MILLIGRAM(S): 2 INJECTION INTRAMUSCULAR; INTRAVENOUS; SUBCUTANEOUS at 18:31

## 2018-01-18 RX ADMIN — NYSTATIN CREAM 1 APPLICATION(S): 100000 CREAM TOPICAL at 17:30

## 2018-01-18 RX ADMIN — Medication 106.62 MILLIGRAM(S): at 14:16

## 2018-01-18 RX ADMIN — CHLORHEXIDINE GLUCONATE 15 MILLILITER(S): 213 SOLUTION TOPICAL at 05:45

## 2018-01-18 RX ADMIN — Medication 150 MEQ/KG/HR: at 14:14

## 2018-01-18 RX ADMIN — Medication 103 MILLIGRAM(S): at 15:30

## 2018-01-18 RX ADMIN — Medication 50 MILLIGRAM(S): at 13:00

## 2018-01-18 RX ADMIN — NYSTATIN CREAM 1 APPLICATION(S): 100000 CREAM TOPICAL at 05:45

## 2018-01-18 RX ADMIN — Medication 50 MILLIGRAM(S): at 05:52

## 2018-01-18 RX ADMIN — ETHAMBUTOL HYDROCHLORIDE 800 MILLIGRAM(S): 400 TABLET, FILM COATED ORAL at 12:01

## 2018-01-18 RX ADMIN — DEXMEDETOMIDINE HYDROCHLORIDE IN 0.9% SODIUM CHLORIDE 7.26 MICROGRAM(S)/KG/HR: 4 INJECTION INTRAVENOUS at 13:17

## 2018-01-18 RX ADMIN — HYDROMORPHONE HYDROCHLORIDE 0.5 MILLIGRAM(S): 2 INJECTION INTRAMUSCULAR; INTRAVENOUS; SUBCUTANEOUS at 14:00

## 2018-01-18 RX ADMIN — Medication 250 MILLIGRAM(S): at 13:15

## 2018-01-18 RX ADMIN — Medication 100 MILLIGRAM(S): at 10:30

## 2018-01-18 RX ADMIN — HYDROMORPHONE HYDROCHLORIDE 0.5 MILLIGRAM(S): 2 INJECTION INTRAMUSCULAR; INTRAVENOUS; SUBCUTANEOUS at 19:15

## 2018-01-18 RX ADMIN — DEXMEDETOMIDINE HYDROCHLORIDE IN 0.9% SODIUM CHLORIDE 7.26 MICROGRAM(S)/KG/HR: 4 INJECTION INTRAVENOUS at 16:30

## 2018-01-18 RX ADMIN — MORPHINE SULFATE 5 MG/HR: 50 CAPSULE, EXTENDED RELEASE ORAL at 21:49

## 2018-01-18 RX ADMIN — CHLORHEXIDINE GLUCONATE 15 MILLILITER(S): 213 SOLUTION TOPICAL at 17:28

## 2018-01-18 RX ADMIN — Medication 2.18 MICROGRAM(S)/KG/MIN: at 00:04

## 2018-01-18 RX ADMIN — DEXMEDETOMIDINE HYDROCHLORIDE IN 0.9% SODIUM CHLORIDE 7.26 MICROGRAM(S)/KG/HR: 4 INJECTION INTRAVENOUS at 10:00

## 2018-01-18 RX ADMIN — Medication 103 MILLIGRAM(S): at 02:36

## 2018-01-18 RX ADMIN — Medication 150 MEQ/KG/HR: at 00:06

## 2018-01-18 RX ADMIN — PANTOPRAZOLE SODIUM 40 MILLIGRAM(S): 20 TABLET, DELAYED RELEASE ORAL at 17:30

## 2018-01-18 RX ADMIN — Medication 52 MILLIGRAM(S): at 17:29

## 2018-01-18 RX ADMIN — Medication 50 MILLIGRAM(S): at 17:29

## 2018-01-18 RX ADMIN — CEFEPIME 100 MILLIGRAM(S): 1 INJECTION, POWDER, FOR SOLUTION INTRAMUSCULAR; INTRAVENOUS at 10:30

## 2018-01-18 RX ADMIN — HYDROMORPHONE HYDROCHLORIDE 0.5 MILLIGRAM(S): 2 INJECTION INTRAMUSCULAR; INTRAVENOUS; SUBCUTANEOUS at 13:45

## 2018-01-18 RX ADMIN — RIFABUTIN 300 MILLIGRAM(S): 150 CAPSULE ORAL at 14:15

## 2018-01-18 RX ADMIN — DEXMEDETOMIDINE HYDROCHLORIDE IN 0.9% SODIUM CHLORIDE 7.26 MICROGRAM(S)/KG/HR: 4 INJECTION INTRAVENOUS at 04:26

## 2018-01-18 RX ADMIN — Medication 520 MILLIGRAM(S): at 00:03

## 2018-01-18 RX ADMIN — Medication 103 MILLIGRAM(S): at 10:30

## 2018-01-18 NOTE — PROGRESS NOTE ADULT - PROBLEM SELECTOR PROBLEM 2
Elevated LFTs
Elevated LFTs
Abnormal CAT scan
Anemia
Elevated LFTs
Fever
Diarrhea, unspecified type
Elevated transaminase level
Elevated transaminase level
Fever
R/O Echinococcosis
R/O Pneumocystis jiroveci pneumonia
Septic shock
Shock
R/O Pneumocystis jiroveci pneumonia
Fever
Diarrhea, unspecified type
R/O Echinococcosis
Diarrhea, unspecified type
R/O Echinococcosis

## 2018-01-18 NOTE — PROGRESS NOTE ADULT - PROBLEM SELECTOR PROBLEM 7
Thrush
Pancytopenia
Type 2 diabetes mellitus without complication, without long-term current use of insulin
AIDS
Hypomagnesemia
Pancytopenia
Thrush
Type 2 diabetes mellitus without complication, without long-term current use of insulin
AIDS

## 2018-01-18 NOTE — PROGRESS NOTE ADULT - PROBLEM SELECTOR PROBLEM 6
Anemia due to blood loss
Thrush
Acute respiratory failure with hypoxia
Anemia due to blood loss
Thrush
Acute respiratory failure with hypoxia
Anemia
Anemia due to blood loss
Hypomagnesemia
Hyponatremia
Thrush
Anemia

## 2018-01-18 NOTE — PROGRESS NOTE ADULT - ASSESSMENT
68 y/o F with persistent diarrhea, Fever, new dx of HIV  WAS PUT ON BACTRIM friday    DOUBT PCP  WITH NORMAL CT CHEST   AWAIT CD4 AND VIRAL LOAD  PT WITH RECURRENT TEMPS NO OBVIOUS SOURCE ABNL LFTS  NON TOXIC  WILL DEFER MORE ABX AT PRESENT   IF SPIKES AGAIN WOULD RPEAT BLOOD CX X2 AND START EMPIRIC ABX
66 y/o F with persistent diarrhea, Fever, new dx of HIV
66 y/o F with persistent diarrhea, Fever, new dx of HIV  WAS PUT ON BACTRIM LAST PM   DOUBT PCP  WITH NORMAL CT CHEST  WILL FOLLOW UP   WITH FURTHER RECOMMENDATIONS
66 yo F with h/o DM, GERD, HTN, HLD here with possible echniococcal infection. Found to be HIV positive.
67 YOF with newly diagnosed HIV/AIDS, Diarrhea likely due to Norovirus, severe sepsis and septic shock in immunocompromised host, hypoglycemia, pancytopenia. Guarded prognosis as patient condition continues to deteriorate.    Update family really no improvement in current status, DNR, discussed de-escalation and comfort measures family needed to discuss, continue current management, add diluaid    Rass - -5   Cam - positive  VAP bundle - yes  GI ppx - yes  DVT ppx - yes  Nutrition - TF    Invasive Devices: CVC, maryam, almodovar    Advanced directives: DNR
67 year old female diagnosed HIV/AIDS, Norovirus, severe sepsis and septic shock in immunocompromised host progressing to multisystem organ failure, hypoglycemia, pancytopenia.   Pt now terminal wean being placed on comfort care after lengthy discussion with family plan to withdraw ventilatory support/ IV pressors to support MAP/ and start of morphine/ dialaudid for respiratory comfort during terminal extubation     Critical Care time: 45 mins assessing presenting problems of acute illness that poses high probability of life threatening deterioration or end organ damage/dysfunction.  Medical decision making inculding Initiating plan of withdrawl of current lcare, reviewing data, ,direct patient bedside evaluation and interpretation of vital signs, any necessary ventilator management , discusion with multidisciplinary team, discussing goals of care with patient/family, all non inclusive of procedures
68 y/o F with persistent diarrhea, Fever, new dx of HIV
68 y/o F with persistent diarrhea, Fever, new dx of HIV
68 y/o F with persistent diarrhea, Fever, new dx of HIV. now with septic shcok
68 yo F with h/o DM, GERD, HTN, HLD here with possible echinococcal infection. Found to be HIV positive.   course complicated by episode of acute hypoxic resp failure with tachycardia. unclear etiology. EF 60% and Grd 1 diastolic dysfxn
68 yo F with h/o DM, GERD, HTN, HLD here with possible echniococcal infection. Found to be HIV positive.
68 yo F with h/o DM, GERD, HTN, HLD here with possible echniococcal infection. Found to be HIV positive.   course complicated by episode of acute hypoxic resp failure with tachycardia. unclear etiology. EF 60% and Grd 1 diastolic dysfxn  clinical appears she has viral infection and RVP ordered.
67F with HIV, respiratory failure, vented, shock on pressors, multiorgan failure, worsening kidney failure.
66 y/o F with persistent diarrhea, Fever, new dx of HIV. now with septic shcok
68 y/o F with persistent diarrhea, Fever, new dx of HIV
68 y/o F with persistent diarrhea, Fever, new dx of HIV
68 yo F with h/o DM, GERD, HTN, HLD here with possible echniococcal infection. Found to be HIV positive.   course complicated by episode of acute hypoxic resp failure with tachycardia. unclear etiology. EF 60% and Grd 1 diastolic dysfxn
67 YOF with newly diagnosed HIV/AIDS, Diarrhea likely due to Norovirus, severe sepsis and septic shock in immunocompromised host, hypoglycemia, pancytopenia. Guarded prognosis as patient condition continues to deteriorate.
Patient with recently diagnosed HIV infection, abnormal LFTs, diarrhea-norovirus positive.    1. Diarrhea: Will increase IV fluids to 150 mL/hour. Continue supportive care. NO role of any particular antibiotics at this time  2. Abnormal LFTs: ? Infection related. ? HIV cholangiopathy. Can benefit from MRI/MRCP. But will hydrate today and then obtain MRI tomorrow  3. HIV infection: Initiation of treatment as per ID
68 yo F with h/o DM, GERD, HTN, HLD here with possible echinococcal infection. Found to be HIV positive.   course complicated by episode of acute hypoxic resp failure with tachycardia. unclear etiology. EF 60% and Grd 1 diastolic dysfxn

## 2018-01-18 NOTE — PROGRESS NOTE ADULT - PROBLEM SELECTOR PLAN 7
Clotrimazole
Sliding scale   accuchecks while on steroids  a1C 5.3 - NOT diabetic  dietary management
no longer pancytopenic,   Patient has thrombocytopenia, which has improved from 108 to 122 after 2 units FFP  Leukocytosis, likely secondary to acute infectious process.  Normocytic anemia, likely secondary to blood loss. last hemoglobin 5.5, patient is ordered for 2 Units PRBC, will re-test after they are completed.  Will monitor CBC
CD4 count is 5  Will hold off on HAART given her acute illness which will likely be worsened by starting HAART acutely
Clotrimazole
Sliding scale   accuchecks
replete
resolved accept anemia s/p 3 units PRBC  Leukocytosis, likely secondary to acute infectious process.
CD4 count is 5  Will hold off on HAART given her acute illness which will likely be worsened by starting HAART acutely

## 2018-01-18 NOTE — PROGRESS NOTE ADULT - PROBLEM SELECTOR PLAN 6
no signs or symptoms of acute bleeding  possibly due to hemodilution versus overwhelming sepsis  S/p 2u PRBC during this admission  Trend Hgb and transfuse to keep Hgb >9
Improved
Patient is intubated  Vent settings currently:  AC 30/320/60%/+5   will adjust as needed.
no signs or symptoms of acute bleeding  possibly due to hemodilution versus overwhelming sepsis  S/p 2u PRBC during this admission  Trend Hgb and transfuse to keep Hgb >9
Improved
Monitor sodium  possibly due to hypovolemia  C/w IVF
Parvo virus b19 pending
Patient is intubated  Vent settings currently:  AC 30/360/60%/+8  will adjust as needed.
no signs or symptoms of acute bleeding  possibly due to hemodilution versus overwhelming sepsis  S/p 2u PRBC during this admission  Trend Hgb and transfuse to keep Hgb >9
replete
Check Parvo virus b19

## 2018-01-18 NOTE — PROGRESS NOTE ADULT - SUBJECTIVE AND OBJECTIVE BOX
Patient is a 67y old  Female who presents with a chief complaint of abdominal pain fever and diarrhea (09 Jan 2018 05:07)      BRIEF HOSPITAL COURSE: Pt is a 67 YOF h/o DM, HTN, MI, REGI who was admitted after presenting with fever, diarrhea and oral thrush.  She tested positive for HIV this admission and was found to have a CD4 count of 5.  Stool was checked and cultured positive for Norovirus.  Today was recalled to evaluate this patient for ICU due to hypotension, tachycardia and hypoglycemia.  Pt was receiving a 1L IVF bolus and had received 50cc D50.  Pt was moved to ICU as she may require intubation and pressors.  Spoke with ID who was still awaiting CD4 count and will modify ABx now that CD4 resulted as 5, will need PCP prophylaxis, new blood cultures, UA and CXR done this am.  Pt's family updated with , 2 daughters at bedside, pt's surrogate/daughter Lala Carrillo was updated via phone as well.  Pt is critically ill and now meeting criteria for AIDS and is in shock which has thus far responded to fluids.    Events last 24 hours: no improvement overnight with aggressive tx in MS[facility]    PAST MEDICAL & SURGICAL HISTORY:  Anemia  Sleep apnea  Myocardial infarction  GERD (gastroesophageal reflux disease)  Dyslipidemia  Hypertension  Diabetes mellitus  H/O right knee surgery: 8/20  S/P cataract extraction: B/L  S/P partial thyroidectomy  S/P tubal ligation: s/p cyst removal of left axilla  S/P cholecystectomy      Review of Systems:  unable to obtain      Medications:  azithromycin  IVPB 500 milliGRAM(s) IV Intermittent every 24 hours  azithromycin  IVPB      cefepime  IVPB 2000 milliGRAM(s) IV Intermittent every 24 hours  ethambutol 800 milliGRAM(s) Oral daily  metroNIDAZOLE  IVPB 500 milliGRAM(s) IV Intermittent every 12 hours  rifabutin 300 milliGRAM(s) Oral daily  trimethoprim / sulfamethoxazole IVPB 160 milliGRAM(s) IV Intermittent every 8 hours  vancomycin  IVPB 1000 milliGRAM(s) IV Intermittent every 24 hours    norepinephrine Infusion 0.02 MICROgram(s)/kG/Min IV Continuous <Continuous>      dexmedetomidine Infusion 0.5 MICROgram(s)/kG/Hr IV Continuous <Continuous>  HYDROmorphone  Injectable 0.5 milliGRAM(s) IV Push every 1 hour PRN        pantoprazole  Injectable 40 milliGRAM(s) IV Push every 12 hours      dextrose 50% Injectable 12.5 Gram(s) IV Push once  dextrose 50% Injectable 25 Gram(s) IV Push once  dextrose 50% Injectable 25 Gram(s) IV Push once  dextrose Gel 1 Dose(s) Oral once PRN  dextrose Gel 1 Dose(s) Oral once PRN  glucagon  Injectable 1 milliGRAM(s) IntraMuscular once PRN  hydrocortisone sodium succinate Injectable 50 milliGRAM(s) IV Push every 6 hours  vasopressin Infusion 0.04 Unit(s)/Min IV Continuous <Continuous>    ascorbic acid IVPB 1500 milliGRAM(s) IV Intermittent every 6 hours  dextrose 5%. 1000 milliLiter(s) IV Continuous <Continuous>  sodium bicarbonate  Infusion 0.388 mEq/kG/Hr IV Continuous <Continuous>  thiamine IVPB 200 milliGRAM(s) IV Intermittent two times a day      chlorhexidine 0.12% Liquid 15 milliLiter(s) Swish and Spit every 12 hours  nystatin Cream 1 Application(s) Topical two times a day        Mode: AC/ CMV (Assist Control/ Continuous Mandatory Ventilation)  RR (machine): 35  TV (machine): 360  FiO2: 60  PEEP: 8  MAP: 13  PIP: 29      ICU Vital Signs Last 24 Hrs  T(C): 39.6 (18 Jan 2018 17:00), Max: 39.6 (18 Jan 2018 16:00)  T(F): 103.3 (18 Jan 2018 17:00), Max: 103.3 (18 Jan 2018 16:00)  HR: 107 (18 Jan 2018 17:00) (104 - 119)  BP: --  BP(mean): --  ABP: 103/45 (18 Jan 2018 17:00) (95/44 - 137/61)  ABP(mean): 67 (18 Jan 2018 17:00) (63 - 91)  RR: 40 (18 Jan 2018 17:00) (36 - 46)  SpO2: 92% (18 Jan 2018 17:00) (90% - 100%)      ABG - ( 18 Jan 2018 10:59 )  pH: 7.28  /  pCO2: 43    /  pO2: 85    / HCO3: 19    / Base Excess: -6.0  /  SaO2: 96        I&O's Detail    17 Jan 2018 07:01  -  18 Jan 2018 07:00  --------------------------------------------------------  IN:    dexmedetomidine Infusion: 153.3 mL    norepinephrine Infusion: 815.8 mL    Packed Red Blood Cells: 976 mL    sodium bicarbonate  Infusion: 3600 mL    Solution: 200 mL    Solution: 400 mL    Solution: 300 mL    Solution: 1520 mL    Solution: 100 mL    Solution: 250 mL    Solution: 250 mL    Solution: 100 mL  Total IN: 8665.1 mL    OUT:    Indwelling Catheter - Urethral: 170 mL  Total OUT: 170 mL    Total NET: 8495.1 mL      18 Jan 2018 07:01  -  18 Jan 2018 17:19  --------------------------------------------------------  IN:    dexmedetomidine Infusion: 29 mL    norepinephrine Infusion: 109 mL    sodium bicarbonate  Infusion: 750 mL    Solution: 100 mL    Solution: 100 mL    Solution: 50 mL    vasopressin Infusion: 7.2 mL  Total IN: 1145.2 mL    OUT:  Total OUT: 0 mL    Total NET: 1145.2 mL            LABS:                        7.5    24.8  )-----------( 292      ( 18 Jan 2018 05:25 )             23.1     01-18    136  |  88<L>  |  50.0<H>  ----------------------------<  221<H>  5.2   |  18.0<L>  |  1.46<H>    Ca    7.3<L>      18 Jan 2018 05:25  Phos  6.0     01-17  Mg     1.8     01-17    TPro  3.6<L>  /  Alb  1.3<L>  /  TBili  3.5<H>  /  DBili  x   /  AST  6807<H>  /  ALT  501<H>  /  AlkPhos  733<H>  01-18          CAPILLARY BLOOD GLUCOSE      POCT Blood Glucose.: 84 mg/dL (17 Jan 2018 06:37)    PT/INR - ( 17 Jan 2018 16:11 )   PT: 39.2 sec;   INR: 3.47 ratio         PTT - ( 17 Jan 2018 16:11 )  PTT:47.3 sec    CULTURES:  Culture Results:   Moderate Candida albicans  Fungal elements seen  Culture in progress (01-17-18 @ 16:00)  Culture Results:   Growth in aerobic and anaerobic bottles: Gram Positive Cocci in Clusters  Aerobic Bottle: 22:58 Hours to positivity  Anaerobic Bottle: 1day00:40 Hours to positivity  .  TYPE: (C=Critical, N=Notification, A=Abnormal) C  TESTS:  _ BLDGS  DATE/TIME CALLED: _ 01/17/2018 18:31:01  CALLED TO: Danny STARKEY  READ BACK (2 Patient Identifiers)(Y/N): _ Y  READ BACK VALUES (Y/N): _ Y  CALLED BY: _ KB  .  ***Blood Panel PCR results on this specimen are available  approximately 3 hours after the Gram stain result.***  Gram stain, PCR, and/or culture results may not always  correspond due to difference in methodologies.  ************************************************************  This PCR assay was performed using [employer].  The following targets are tested for: Enterococcus,  vancomycin resistant enterococci, Listeria monocytogenes,  coagulase negative staphylococci, S. aureus,  methicillin resistant S. aureus, Streptococcus agalactiae  (Group B), S. pneumoniae, S. pyogenes (Group A),  Acinetobacter baumannii, Enterobacter cloacae, E. coli,  Klebsiella oxytoca, K. pneumoniae, Proteus sp.,  Serratia marcescens, Haemophilus influenzae,  Neisseria meningitidis, Pseudomonas aeruginosa, Candida  albicans, C. glabrata, C krusei, C parapsilosis,  C. tropicalis and the KPC resistance gene. (01-16-18 @ 18:31)  Culture Results:   Growth in anaerobic bottle: Gram Positive Cocci in Clusters  Anaerobic Bottle: 23:08 Hours to positivity  Aerobic Bottle: No growth to date  .  TYPE: (C=Critical, N=Notification, A=Abnormal) c  TESTS:  _ BLDGS  DATE/TIME CALLED: _ 01/17/2018 18:31:32  CALLED TO: Danny RUSHING  READ BACK (2 Patient Identifiers)(Y/N): _ Y  READ BACK VALUES (Y/N): _ Y  CALLED BY: _ KB (01-16-18 @ 18:31)  Culture Results:   No growth at 48 hours (01-16-18 @ 10:09)  Culture Results:   No growth at 48 hours (01-15-18 @ 18:10)  Culture Results:   No Protozoa seen by trichrome stain  No Helminths or Protozoa seen in formalin concentrate  performed by iodine stain  (routine O+P not evaluated for Microsporidia,  Cryptosporidia, Cyclospora, or Isospora.)  Note: One negative specimen does not rule  out the possibility of a parasitic infection. (01-14-18 @ 16:21)  Rapid RVP Result: NotDetec (01-14-18 @ 10:38)  Culture Results:   No growth at 48 hours (01-13-18 @ 22:08)  Culture Results:   No growth at 48 hours (01-13-18 @ 22:08)  Culture Results:   No Cryptosporidium, Cyclospora sp. or Isospora found by modified acid  fast stain (01-13-18 @ 07:30)  Culture Results:   No Microsporidia species found  by modified trichrome stain (01-13-18 @ 07:30)  Culture Results:   No Protozoa seen by trichrome stain  No Helminths or Protozoa seen in formalin concentrate  performed by iodine stain  (routine O+P not evaluated for Microsporidia,  Cryptosporidia, Cyclospora, or Isospora.)  Note: One negative specimen does not rule  out the possibility of a parasitic infection. (01-12-18 @ 22:51)  Culture Results:   No Protozoa seen by trichrome stain  No Helminths or Protozoa seen in formalin concentrate  performed by iodine stain  (routine O+P not evaluated for Microsporidia,  Cryptosporidia, Cyclospora, or Isospora.)  Note: One negative specimen does not rule  out the possibility of a parasitic infection. (01-12-18 @ 19:48)      Physical Examination:    General: intubated and sedated    HEENT: Pupils equal, reactive to light.  Symmetric.    PULM: course diminished    CVS: sinus tachycardia    ABD: + distension, no rebound    EXT: diffuse edema    SKIN: Warm and well perfused, no rashes noted.

## 2018-01-18 NOTE — PROGRESS NOTE ADULT - PROBLEM SELECTOR PLAN 2
shock liver-prognosis poor. Nothing further to offer from GI standpoint. Will see prn your request. shock liver-prognosis poor. Nothing further to offer from GI standpoint. Will see prn your request. Agree with current therapy.

## 2018-01-18 NOTE — PROGRESS NOTE ADULT - SUBJECTIVE AND OBJECTIVE BOX
OVERNIGHT EVENTS:    BRIEF HOSPITAL COURSE:    Present Symptoms:     Dyspnea: 0 1 2 3   Nausea/Vomiting: Yes No  Anxiety:  Yes No  Depression: Yes No  Fatigue: Yes No  Loss of appetite: Yes No    Pain:             Character-            Duration-            Effect-            Factors-            Frequency-            Location-            Severity-    Review of Systems: Reviewed                     Negative:                     Positive:  Unable to obtain due to poor mentation   All others negative    MEDICATIONS  (STANDING):  ascorbic acid IVPB 1500 milliGRAM(s) IV Intermittent every 6 hours  azithromycin  IVPB 500 milliGRAM(s) IV Intermittent every 24 hours  azithromycin  IVPB      cefepime  IVPB 2000 milliGRAM(s) IV Intermittent every 24 hours  chlorhexidine 0.12% Liquid 15 milliLiter(s) Swish and Spit every 12 hours  dexmedetomidine Infusion 0.5 MICROgram(s)/kG/Hr (7.258 mL/Hr) IV Continuous <Continuous>  dextrose 5%. 1000 milliLiter(s) (50 mL/Hr) IV Continuous <Continuous>  dextrose 50% Injectable 12.5 Gram(s) IV Push once  dextrose 50% Injectable 25 Gram(s) IV Push once  dextrose 50% Injectable 25 Gram(s) IV Push once  ethambutol 800 milliGRAM(s) Oral daily  hydrocortisone sodium succinate Injectable 50 milliGRAM(s) IV Push every 6 hours  metroNIDAZOLE  IVPB 500 milliGRAM(s) IV Intermittent every 12 hours  norepinephrine Infusion 0.02 MICROgram(s)/kG/Min (2.177 mL/Hr) IV Continuous <Continuous>  nystatin Cream 1 Application(s) Topical two times a day  pantoprazole  Injectable 40 milliGRAM(s) IV Push every 12 hours  rifabutin 300 milliGRAM(s) Oral daily  sodium bicarbonate  Infusion 0.388 mEq/kG/Hr (150 mL/Hr) IV Continuous <Continuous>  thiamine IVPB 200 milliGRAM(s) IV Intermittent two times a day  trimethoprim / sulfamethoxazole IVPB 106 milliGRAM(s) IV Intermittent every 8 hours  vancomycin  IVPB 1000 milliGRAM(s) IV Intermittent every 24 hours  vasopressin Infusion 0.04 Unit(s)/Min (2.4 mL/Hr) IV Continuous <Continuous>    MEDICATIONS  (PRN):  dextrose Gel 1 Dose(s) Oral once PRN Blood Glucose LESS THAN 70 milliGRAM(s)/deciliter  dextrose Gel 1 Dose(s) Oral once PRN Blood Glucose LESS THAN 70 milliGRAM(s)/deciliter  glucagon  Injectable 1 milliGRAM(s) IntraMuscular once PRN Glucose LESS THAN 70 milligrams/deciliter  HYDROmorphone  Injectable 0.5 milliGRAM(s) IV Push every 1 hour PRN Moderate Pain (4 - 6)      PHYSICAL EXAM:    Vital Signs Last 24 Hrs  T(C): 39.3 (18 Jan 2018 13:00), Max: 39.3 (18 Jan 2018 13:00)  T(F): 102.7 (18 Jan 2018 13:00), Max: 102.7 (18 Jan 2018 13:00)  HR: 111 (18 Jan 2018 13:00) (109 - 125)  BP: --  BP(mean): --  RR: 44 (18 Jan 2018 13:00) (36 - 46)  SpO2: 94% (18 Jan 2018 13:00) (72% - 100%)    General: alert  oriented x ____ lethargic agitated                  cachexia  nonverbal  coma    Karnofsky:  %    HEENT: normal  dry mouth  ET tube/trach    Lungs: comfortable tachypnea/labored breathing  excessive secretions    CV: normal  tachycardia    GI: normal  distended  tender  no BS               PEG/NG/OG tube  constipation  last BM:     : normal  incontinent  oliguria/anuria  almodovar    MSK: normal  weakness  edema             ambulatory  bedbound/wheelchair bound    Skin: normal  pressure ulcers- Stage_____  no rash    LABS:                          7.5    24.8  )-----------( 292      ( 18 Jan 2018 05:25 )             23.1     01-18    136  |  88<L>  |  50.0<H>  ----------------------------<  221<H>  5.2   |  18.0<L>  |  1.46<H>    Ca    7.3<L>      18 Jan 2018 05:25  Phos  6.0     01-17  Mg     1.8     01-17    TPro  3.6<L>  /  Alb  1.3<L>  /  TBili  3.5<H>  /  DBili  x   /  AST  6807<H>  /  ALT  501<H>  /  AlkPhos  733<H>  01-18    PT/INR - ( 17 Jan 2018 16:11 )   PT: 39.2 sec;   INR: 3.47 ratio         PTT - ( 17 Jan 2018 16:11 )  PTT:47.3 sec    I&O's Summary    17 Jan 2018 07:01  -  18 Jan 2018 07:00  --------------------------------------------------------  IN: 8665.1 mL / OUT: 170 mL / NET: 8495.1 mL    18 Jan 2018 07:01  -  18 Jan 2018 13:46  --------------------------------------------------------  IN: 1145.2 mL / OUT: 0 mL / NET: 1145.2 mL        RADIOLOGY & ADDITIONAL STUDIES:    ADVANCE DIRECTIVES:   DNR YES NO  Completed on:                     JAIRON  YES NO   Completed on:  Living Will  YES NO   Completed on: OVERNIGHT EVENTS: doing poorly remains in multiorgan failure     Present Symptoms:     Dyspnea: vented  Nausea/Vomiting: unable   Anxiety:  unable   Depression: unable   Fatigue: unable   Loss of appetite: unable     Pain: none             Character-            Duration-            Effect-            Factors-            Frequency-            Location-            Severity-    Review of Systems: Reviewed                   Unable to obtain due to poor mentation   All others negative    MEDICATIONS  (STANDING):  ascorbic acid IVPB 1500 milliGRAM(s) IV Intermittent every 6 hours  azithromycin  IVPB 500 milliGRAM(s) IV Intermittent every 24 hours  azithromycin  IVPB      cefepime  IVPB 2000 milliGRAM(s) IV Intermittent every 24 hours  chlorhexidine 0.12% Liquid 15 milliLiter(s) Swish and Spit every 12 hours  dexmedetomidine Infusion 0.5 MICROgram(s)/kG/Hr (7.258 mL/Hr) IV Continuous <Continuous>  dextrose 5%. 1000 milliLiter(s) (50 mL/Hr) IV Continuous <Continuous>  dextrose 50% Injectable 12.5 Gram(s) IV Push once  dextrose 50% Injectable 25 Gram(s) IV Push once  dextrose 50% Injectable 25 Gram(s) IV Push once  ethambutol 800 milliGRAM(s) Oral daily  hydrocortisone sodium succinate Injectable 50 milliGRAM(s) IV Push every 6 hours  metroNIDAZOLE  IVPB 500 milliGRAM(s) IV Intermittent every 12 hours  norepinephrine Infusion 0.02 MICROgram(s)/kG/Min (2.177 mL/Hr) IV Continuous <Continuous>  nystatin Cream 1 Application(s) Topical two times a day  pantoprazole  Injectable 40 milliGRAM(s) IV Push every 12 hours  rifabutin 300 milliGRAM(s) Oral daily  sodium bicarbonate  Infusion 0.388 mEq/kG/Hr (150 mL/Hr) IV Continuous <Continuous>  thiamine IVPB 200 milliGRAM(s) IV Intermittent two times a day  trimethoprim / sulfamethoxazole IVPB 106 milliGRAM(s) IV Intermittent every 8 hours  vancomycin  IVPB 1000 milliGRAM(s) IV Intermittent every 24 hours  vasopressin Infusion 0.04 Unit(s)/Min (2.4 mL/Hr) IV Continuous <Continuous>    MEDICATIONS  (PRN):  dextrose Gel 1 Dose(s) Oral once PRN Blood Glucose LESS THAN 70 milliGRAM(s)/deciliter  dextrose Gel 1 Dose(s) Oral once PRN Blood Glucose LESS THAN 70 milliGRAM(s)/deciliter  glucagon  Injectable 1 milliGRAM(s) IntraMuscular once PRN Glucose LESS THAN 70 milligrams/deciliter  HYDROmorphone  Injectable 0.5 milliGRAM(s) IV Push every 1 hour PRN Moderate Pain (4 - 6)    PHYSICAL EXAM:    Vital Signs Last 24 Hrs  T(C): 39.3 (18 Jan 2018 13:00), Max: 39.3 (18 Jan 2018 13:00)  T(F): 102.7 (18 Jan 2018 13:00), Max: 102.7 (18 Jan 2018 13:00)  HR: 111 (18 Jan 2018 13:00) (109 - 125)  BP: --  BP(mean): --  RR: 44 (18 Jan 2018 13:00) (36 - 46)  SpO2: 94% (18 Jan 2018 13:00) (72% - 100%)    General: lethargic     Karnofsky:  10%    HEENT: ET tube    Lungs: tachypnea    CV: normal      : almodovar    MSK: weakness     Skin: no rash    LABS:                      7.5    24.8  )-----------( 292      ( 18 Jan 2018 05:25 )             23.1     01-18    136  |  88<L>  |  50.0<H>  ----------------------------<  221<H>  5.2   |  18.0<L>  |  1.46<H>    Ca    7.3<L>      18 Jan 2018 05:25  Phos  6.0     01-17  Mg     1.8     01-17    TPro  3.6<L>  /  Alb  1.3<L>  /  TBili  3.5<H>  /  DBili  x   /  AST  6807<H>  /  ALT  501<H>  /  AlkPhos  733<H>  01-18    PT/INR - ( 17 Jan 2018 16:11 )   PT: 39.2 sec;   INR: 3.47 ratio      PTT - ( 17 Jan 2018 16:11 )  PTT:47.3 sec    I&O's Summary    17 Jan 2018 07:01  -  18 Jan 2018 07:00  --------------------------------------------------------  IN: 8665.1 mL / OUT: 170 mL / NET: 8495.1 mL    18 Jan 2018 07:01  -  18 Jan 2018 13:46  --------------------------------------------------------  IN: 1145.2 mL / OUT: 0 mL / NET: 1145.2 mL    RADIOLOGY & ADDITIONAL STUDIES:    ADVANCE DIRECTIVES: DNR

## 2018-01-18 NOTE — PROGRESS NOTE ADULT - PROBLEM SELECTOR PLAN 4
Discussed diagnosis in detail  Viral load is pending  patient does not recall if she ever had testing for HIV  Patient also has not been sexually active in over 20 years, no h/o IVDU  May need psychiatric eval if depressed or in denial
most recent CD4 count of 5 and viral load [phone]   treating patient for suspected opportunistic infections (MAC and PCP)  Will send cytology for PCP DFA.   Infectious disease is following patient.
new diagnosis  Discussed at length with patient regarding new diagnosis. she denies any sexual partner for over 20 years since her 's death. [facility]  Viral load pending, CD4 count 5. Maxipime started given unknown etio of sepsis  defer to ID consult for HIV meds.  [facility] notified
Discussed diagnosis in detail  Viral load is pending  patient does not recall if she ever had testing for HIV  Patient also has not been sexually active in over 20 years, no h/o IVDU  May need psychiatric eval if depressed or in denial
Clotrimazole
Discussed diagnosis in detail  Viral load is pending  patient does not recall if she ever had testing for HIV  Patient also has not been sexually active in over 20 years, no h/o IVDU
Discussed diagnosis in detail  Viral load is pending  patient does not recall if she ever had testing for HIV  Patient also has not been sexually active in over 20 years, no h/o IVDU
Discussed diagnosis in detail  Viral load is pending  patient does not recall if she ever had testing for HIV  Patient also has not been sexually active in over 20 years, no h/o IVDU  May need psychiatric eval if depressed or in denial
Hepatitis profile is negative  GI following  At this point it is likely shock liver
most recent CD4 count of 5 and viral load [phone]   treating patient for suspected opportunistic infections (MAC and PCP)  PCP DFA pending
new diagnosis  Discussed at length with patient regarding new diagnosis. she denies any sexual partner for over 20 years since her 's death.   Viral load pending, CD4 count pending  ID consult appreciated  [facility] notified
new diagnosis  Discussed at length with patient regarding new diagnosis. she denies any sexual partner for over 20 years since her 's death.   Viral load pending, CD4 count pending  ID consult appreciated  [facility] notified
new diagnosis  Discussed at length with patient regarding new diagnosis. she denies any sexual partner for over 20 years since her 's death. [facility]  Viral load pending, CD4 count pending  ID consult appreciated  [facility] notified
new diagnosis  Discussed at length with patient regarding new diagnosis. she denies any sexual partner for over 20 years since her 's death. [facility]  Viral load pending, CD4 count pending  ID consult appreciated  [facility] notified
no signs or symptoms of acute bleeding  possibly due to hemodilution versus overwhelming sepsis  Type and screen active. Consent obtained  Transfuse 1u PRBC now.   Trend Hgb and transfuse to keep Hgb >9
- patient with extremely grave prognosis. will plan to meet with the family again today to discuss shifting focus to comfort measures only and potentially withdrawal of the ventilator.
Hepatitis profile is negative  GI following  ? Need for Liver MRI
new diagnosis  Discussed at length with patient regarding new diagnosis. she denies any sexual partner for over 20 years since her 's death. [facility]  Viral load pending, CD4 count pending  ID consult appreciated  [facility] notified

## 2018-01-18 NOTE — PROGRESS NOTE ADULT - PROBLEM SELECTOR PLAN 2
CT Chest with Bilateral pulmonary infiltrates  CD4 count of 5  Obtain sputum for PJP  Continue treatment for PJP with Bactrim

## 2018-01-18 NOTE — PROGRESS NOTE ADULT - PROBLEM SELECTOR PLAN 1
MSOF, shock multiple vasopressors, biocarb gtt, lactate 13    txMAC and PCP pneumonia given her worsening condition and recent diagnosis of AIDS with a CD4 count of 5.

## 2018-01-18 NOTE — PROGRESS NOTE ADULT - PROVIDER SPECIALTY LIST ADULT
Critical Care
Gastroenterology
Hospitalist
Infectious Disease
Internal Medicine
Internal Medicine
MICU
Palliative Care
Gastroenterology
Gastroenterology
Infectious Disease
Infectious Disease
Internal Medicine
Critical Care
Infectious Disease
Internal Medicine
Infectious Disease

## 2018-01-18 NOTE — PROGRESS NOTE ADULT - PROBLEM SELECTOR PROBLEM 4
AIDS
HIV antibody positive
HIV antibody positive
AIDS
HIV antibody positive
AIDS
Anemia due to blood loss
Elevated transaminase level
HIV antibody positive
Thrush
Palliative care encounter
Elevated transaminase level
HIV antibody positive
HIV antibody positive

## 2018-01-18 NOTE — PROGRESS NOTE ADULT - PROBLEM SELECTOR PLAN 1
Remains shock with multiorgan failure  On pressors  Pancytopenia   On fluid bolus  Continue Cefepime  blood culture from 1/16 3/6 bottles with CoNS, 1/17 blood culture so far negative  will follow up cultures  CT Chest with + pneumonia  UA negative for UTI  CT A/P with no acute findings  Continue treatment for PJP with Bactrim

## 2018-01-18 NOTE — PROGRESS NOTE ADULT - ATTENDING COMMENTS
Will Follow
Thank you for the opportunity to assist with the care of this patient.   [facility] [phone].
Will Follow
I have seen and evaluated the patient with resident    septic shock in setting of AIDS new dx  tx empirically for PCP, HAP, JOSE  vasopressin/levophed, cherise kapoor  MV for acute respiratory failure  Met with family - understand gravity of situation , DNR contiue current care, serial troponins
11. Metabolic acidosis- likely sec to all of above. bicarb PO added.

## 2018-01-18 NOTE — PROGRESS NOTE ADULT - SUBJECTIVE AND OBJECTIVE BOX
[facility]  INFECTIOUS DISEASES AND INTERNAL MEDICINE at [place]  =======================================================  Raymon Ortiz MD  Diplomates American Board of Internal Medicine and Infectious Diseases  =======================================================    CLOUD, SARA [id number]    Follow up: Diarrhea, Sepsis, respiratory failure    afebrile    s/p intubation 18    Allergies:  No Known Allergies      Antibiotics:  azithromycin  IVPB 500 milliGRAM(s) IV Intermittent every 24 hours  cefepime  IVPB 2000 milliGRAM(s) IV Intermittent every 24 hours  ethambutol 800 milliGRAM(s) Oral daily  metroNIDAZOLE  IVPB 500 milliGRAM(s) IV Intermittent every 8 hours  rifabutin 300 milliGRAM(s) Oral daily  trimethoprim / sulfamethoxazole IVPB 320 milliGRAM(s) IV Intermittent every 8 hours  vancomycin  IVPB 1000 milliGRAM(s) IV Intermittent every 24 hours      REVIEW OF SYSTEMS:  Unable to obtain, patient is sedated      Physical Exam:  ICU Vital Signs Last 24 Hrs  T(C): 39 (2018 10:00), Max: 39.2 (2018 00:00)  T(F): 102.2 (2018 10:00), Max: 102.6 (2018 00:00)  HR: 111 (2018 10:00) (109 - 127)  BP: 98/53 (2018 10:45) (98/53 - 98/53)  BP(mean): 71 (2018 10:45) (71 - 71)  ABP: 100/48 (2018 10:00) (88/41 - 164/77)  ABP(mean): 69 (2018 10:00) (60 - 169)  RR: 41 (2018 10:00) (36 - 46)  SpO2: 99% (2018 10:00) (72% - 100%)      GEN: sedated  HEENT: normocephalic and atraumatic. +ETT.    NECK: Supple.   LUNGS: Course BS B/L  HEART: Regular rate and rhythm  ABDOMEN: Soft, Mild tenderness to deep palpation, and nondistended.  Positive bowel sounds.    : + Daniels  EXTREMITIES: Without any edema.  MSK: No joint swelling  NEUROLOGIC: Sedated  PSYCHIATRIC: unable to access   SKIN: No rash      Labs:      136  |  88<L>  |  50.0<H>  ----------------------------<  221<H>  5.2   |  18.0<L>  |  1.46<H>    Ca    7.3<L>      2018 05:25  Phos  6.0       Mg     1.8         TPro  3.6<L>  /  Alb  1.3<L>  /  TBili  3.5<H>  /  DBili  x   /  AST  6807<H>  /  ALT  501<H>  /  AlkPhos  733<H>                            7.5    24.8  )-----------( 292      ( 2018 05:25 )             23.1       PT/INR - ( 2018 16:11 )   PT: 39.2 sec;   INR: 3.47 ratio         PTT - ( 2018 16:11 )  PTT:47.3 sec  Urinalysis Basic - ( 2018 11:33 )    Color: Yellow / Appearance: Clear / S.015 / pH: x  Gluc: x / Ketone: Trace  / Bili: Moderate / Urobili: 1 mg/dL   Blood: x / Protein: 30 mg/dL / Nitrite: Negative   Leuk Esterase: Trace / RBC: 0-2 /HPF / WBC 10-15 /HPF   Sq Epi: x / Non Sq Epi: Moderate / Bacteria: Few      LIVER FUNCTIONS - ( 2018 05:25 )  Alb: 1.3 g/dL / Pro: 3.6 g/dL / ALK PHOS: 733 U/L / ALT: 501 U/L / AST: 6807 U/L / GGT: x             ABG - ( 2018 16:18 )  pH: 7.17  /  pCO2: 57    /  pO2: 72    / HCO3: 18    / Base Excess: -8.4  /  SaO2: 92          RECENT CULTURES:   @ 16:00 .Sputum Sputum     Few White blood cells  Few Yeast       @ 18:31 .Blood Blood Blood Culture PCR    Growth in aerobic and anaerobic bottles: Gram Positive Cocci in Clusters  Aerobic Bottle: 22:58 Hours to positivity  Anaerobic Bottle: 1day00:40 Hours to positivity  ***Blood Panel PCR results on this specimen are available  approximately 3 hours after the Gram stain result.***  Gram stain, PCR, and/or culture results may not always  correspond due to difference in methodologies.  ************************************************************  This PCR assay was performed using [employer].  The following targets are tested for: Enterococcus,  vancomycin resistant enterococci, Listeria monocytogenes,  coagulase negative staphylococci, S. aureus,  methicillin resistant S. aureus, Streptococcus agalactiae  (Group B), S. pneumoniae, S. pyogenes (Group A),  Acinetobacter baumannii, Enterobacter cloacae, E. coli,  Klebsiella oxytoca, K. pneumoniae, Proteus sp.,  Serratia marcescens, Haemophilus influenzae,  Neisseria meningitidis, Pseudomonas aeruginosa, Candida  albicans, C. glabrata, C krusei, C parapsilosis,  C. tropicalis and the KPC resistance gene.      01-15 @ 18:10 .Blood Blood-Venous     No growth at 48 hours       @ 16:21 .Stool Feces     No Protozoa seen by trichrome stain  No Helminths or Protozoa seen in formalin concentrate  performed by iodine stain  (routine O+P not evaluated for Microsporidia,  Cryptosporidia, Cyclospora, or Isospora.)  Note: One negative specimen does not rule  out the possibility of a parasitic infection.       @ 10:38      NotDete       @ 22:08 .Blood Blood-Peripheral     No growth at 48 hours       @ 07:30 .Stool Stool     No Cryptosporidium, Cyclospora sp. or Isospora found by modified acid  fast stain       @ 22:51 .Stool Feces     No Protozoa seen by trichrome stain  No Helminths or Protozoa seen in formalin concentrate  performed by iodine stain  (routine O+P not evaluated for Microsporidia,  Cryptosporidia, Cyclospora, or Isospora.)  Note: One negative specimen does not rule  out the possibility of a parasitic infection.       19:48 .Stool Feces     No Protozoa seen by trichrome stain  No Helminths or Protozoa seen in formalin concentrate  performed by iodine stain  (routine O+P not evaluated for Microsporidia,  Cryptosporidia, Cyclospora, or Isospora.)  Note: One negative specimen does not rule  out the possibility of a parasitic infection.       @ 08:31 .Stool Feces     Norovirus GI/GII  DETECTED by PCR  *******Please Note:*******  GI panel PCR evaluates for:  Campylobacter, Plesiomonas shigelloides, Salmonella,  Vibrio, Yersinia enterocolitica, Enteroaggregative  Escherichia coli (EAEC), Enteropathogenic E.coli (EPEC),  Enterotoxigenic E. coli (ETEC) lt/st, Shiga-like  toxin-producing E. coli (STEC) stx1/stx2,  Shigella/ Enteroinvasive E. coli (EIEC), Cryptosporidium,  Cyclospora cayetanensis, Entamoeba histolytica,  Giardia lamblia, Adenovirus F 40/41, Astrovirus,  Norovirus GI/GII, Rotavirus A, Sapovirus      01-10 @ 02:43 .Urine Catheterized     No growth      01-10 @ 02:16 .Blood Arterial Catheter     No growth at 5 days.      01-10 @ 01:49 .Blood Blood     No growth at 5 days.       @ 18:36 .Stool Feces     No Protozoa seen by trichrome stain  No Helminths or Protozoa seen in formalin concentrate  performed by iodine stain  (routine O+P not evaluated for Microsporidia,  Cryptosporidia, Cyclospora, or Isospora.)  Note: One negative specimen does not rule  out the possibility of a parasitic infection.       @ 11:01 .Stool Feces     No enteric gram negative rods isolated  No enteric pathogens isolated.  (Stool culture examined for Salmonella,  Shigella, Campylobacter, Aeromonas, Plesiomonas,  Vibrio, E.coli O157 and Yersinia)       19:42 .Blood Blood     No growth at 5 days.       19:41 .Blood Blood     No growth at 5 days.       @ 17:48 .Stool Feces     No enteric pathogens isolated.  (Stool culture examined for Salmonella,  Shigella, Campylobacter, Aeromonas, Plesiomonas,  Vibrio, E.coli O157 and Yersinia)       @ 16:02 .Blood Blood     No growth at 5 days.       @ 16:01 .Blood Blood     No growth at 5 days.          EXAM:  CT ABDOMEN AND PELVIS IC                         EXAM:  CT CHEST IC                        PROCEDURE DATE:  2018    INTERPRETATION:  HISTORY:  Septic shock. .  Date/Time of exam: 2018 4:28 PM  TECHNIQUE:  Sections were obtained from the lung apices to the symphysis   pubis without oral or intravenous contrast.     COMPARISON EXAMINATION:     2018.  FINDINGS:    No evidence of mediastinal or hilar lymphadenopathy. Small right pleural   effusion. No evidence of a left pleural effusion. No evidence of a   pericardial effusion. No evidence of axillary adenopathy.  Status post left hemithyroidectomy.  Bilateral groundglass infiltrates in both upper lobes, right side worse   than left. Less pronounced inflammatory changes noted in the lower lobes bilaterally.  There is a calcification in the left lobe of the liver, unchanged and   2018. Pattern megaly. Status post cholecystectomy.  The spleen is not enlarged and demonstrates no focal abnormality. The   pancreatic contour is unremarkable without evidence of mass, inflammation   or ductal dilatation. The adrenal glands demonstrate normal size and contour.  The kidneys reveal a normal unenhanced morphology without evidence of   stone or hydronephrosis.  No evidence of retroperitoneal or pelvic lymphadenopathy.  The bladder is unremarkable. The uterus and adnexal structures   demonstrate no abnormality.  There is a small amount of ascites layering in the pelvis.  No intestinal abnormality is identified on this study performed without contrast.  Degenerative changes in the spine.  IMPRESSION:   Bilateral pulmonary infiltrates most pronounced in the upper lobes, right   side greater than left.  Small right pleural effusion.  Hepatomegaly  *** ADDENDUM 2018  ***  Addendum:  Subsequent to the noncontrast CT scan, a full post IV contrast CT scan of   the chest abdomen and pelvis was performed. 95 cc of Omnipaque 300 administered.  These images demonstrate no additional findings.  *** END OF ADDENDUM 2018  ***

## 2018-01-18 NOTE — DISCHARGE NOTE FOR THE EXPIRED PATIENT - HOSPITAL COURSE
67 year old female with PMHx of HTN, MI, DM, REGI who was admitted with fever, diarrhea, oral thrush and testing positive for norovirus in the stool. While admitted to medicine and on the floor she tested positive for HIV this admission with  a CD4 count of 5.  While on the floor she was a rapid response for hypotension, tachycardia and hypoglycemia, was transfered to ICU eventually required intubation for acute hypoxic repiratory fialure and septic shock with on IV pressors.  Spoke with ID who was still awaiting CD4 count and will modify ABx now that CD4 resulted as 5, will need PCP prophylaxis, new blood cultures, UA and CXR done this am.  Pt's family updated with , 2 daughters at bedside, pt's surrogate/daughter Lala Carrillo was updated via phone as well.  Pt is critically ill and now meeting criteria for AIDS and is in shock which has thus far responded to fluids. 67 year old female with PMHx of HTN, MI, DM, REGI who was admitted with fever, diarrhea, oral thrush and testing positive for norovirus in the stool. While admitted to medicine and on the floor she tested positive for HIV this admission with  a CD4 count of 5.  While on the floor she was a rapid response for hypotension, tachycardia and hypoglycemia, was transfered to ICU eventually required intubation for acute hypoxic respiratory failure and septic shock with on IV pressors. She was also treated empericly for PCP pneumonia but progressed over the ICU course from septic shock to multisystem organ failure on multiple IV pressors .  Tonight i conducted a family meeting at their request to discuss their discission to make the patient comfort care and terminal wean. The patient was placed on comfort care, IV pressors were stopped and she was extubated with family at bedside the patient  one to two minutes after extubation.  I examined the patient and found her to be asystole on monitor , have no spontaneous heart beat or respirations, no palpable pules and absent ascultation of heart or lung sounds via stethoscope. She was pronunced dead at 22:15

## 2018-01-18 NOTE — PROGRESS NOTE ADULT - PROBLEM SELECTOR PLAN 5
Hepatitis profile is negative  GI following  ? Need for Liver MRI
Likely related to HIV and bone marrow suppression due to overwhelming sepsis  Anemia improving s/p 2u PRBC  Thrombocytopenia worsening - continuing to trend down, transfuse to keep Plts >50 if bleeding, d/c lovenox. this can be reactive in etiology  Monitor leukopenia
resolved, will continue to monitor.
Hepatitis profile is negative  GI following  ? Need for Liver MRI
Hepatitis profile is negative  GI following
Hepatitis profile is negative  GI following
Clotrimazole
Hepatitis profile is negative  GI eval
Hepatitis profile is negative  GI eval
Hepatitis profile is negative  GI following
Likely related to HIV and bone marrow suppression due to overwhelming sepsis  Anemia improving s/p 2u PRBC  Thrombocytopenia worsening - continuing to trend down, transfuse to keep Plts >50 if bleeding, d/c lovenox  Monitor leukopenia
Likely related to HIV and bone marrow suppression due to overwhelming sepsis  Anemia improving s/p 2u PRBC  Thrombocytopenia worsening - continuing to trend down, transfuse to keep Plts >50 if bleeding, d/c lovenox. this can be reactive in etiology  Monitor leukopenia
Likely related to HIV and bone marrow suppression due to overwhelming sepsis  Anemia improving s/p 2u PRBC  Thrombocytopenia worsening - continuing to trend down, transfuse to keep Plts >50 if bleeding, d/c lovenox. this can be reactive in etiology  Monitor leukopenia
Likely related to HIV and bone marrow suppression due to overwhelming sepsis  Anemia improving s/p 2u PRBC  Thrombocytopenia worsening - peripheral smear pending, repeat platelet count, transfuse to keep Plts >50 if bleeding, d/c lovenox  Leukopenia improving
Monitor sodium  possibly due to hypovolemia  C/w IVF
Stool PCR positive for Norovirus  Stool O&P Negative  Stool AFB negative  stool Giardia Ag negative  C Diff is negative  Stool culture negative  Cryptosporidium, Cyclospora, Isospora and Microsporidia negative
resolved, will continue to monitor.
Stool PCR positive for Norovirus  Stool O&P Negative  Stool AFB negative  stool Giardia Ag negative  C Diff is negative  Stool culture negative  Cryptosporidium, Cyclospora and Isospora negative
Likely related to HIV and bone marrow suppression due to overwhelming sepsis  Anemia improving s/p 2u PRBC  Thrombocytopenia worsening - continuing to trend down, transfuse to keep Plts >50 if bleeding, d/c lovenox. this can be reactive in etiology  Monitor leukopenia

## 2018-01-18 NOTE — PROGRESS NOTE ADULT - PROBLEM SELECTOR PROBLEM 8
Type 2 diabetes mellitus without complication, without long-term current use of insulin
Acute respiratory failure with hypoxia
Type 2 diabetes mellitus with complication, unspecified long term insulin use status
Essential hypertension
Hypomagnesemia
Hypomagnesemia
R/O Echinococcosis
Type 2 diabetes mellitus with complication, unspecified long term insulin use status
Type 2 diabetes mellitus without complication, without long-term current use of insulin
R/O Echinococcosis

## 2018-01-18 NOTE — PROGRESS NOTE ADULT - PROBLEM SELECTOR PROBLEM 5
Elevated transaminase level
Hypoglycemia
Pancytopenia
Elevated transaminase level
Diarrhea, unspecified type
Elevated transaminase level
Hypoglycemia
Hyponatremia
Pancytopenia
Thrush
Diarrhea, unspecified type
Pancytopenia

## 2018-01-18 NOTE — PROGRESS NOTE ADULT - PROBLEM SELECTOR PLAN 8
Echinococcus serology negative  Calcified cysts on CT which goes with prior infection and dead cysts
Sliding scale   accuchecks while on steroids  a1C 5.3 - NOT diabetic  dietary management
episode overnight sec to IVF. now resolved. still on O2 NC. darrell.   accepted to ICU
patient has had recent episode of hypoglycemia. will continue to monitor blood sugar and treat as clinically indicated.
Echinococcus serology negative  Calcified cysts on CT which goes with prior infection and dead cysts
Normotensive  Monitor BP
Sliding scale   accuchecks  a1C 5.3 - NOT diabetic
Sliding scale   accuchecks while on steroids  a1C 5.3 - NOT diabetic  dietary management
Sliding scale   accuchecks while on steroids  a1C 5.3 - NOT diabetic  dietary management
patient has had recent episode of hypoglycemia. will continue to monitor blood sugar and treat as clinically indicated.
replete
replete

## 2018-01-18 NOTE — PROGRESS NOTE ADULT - SUBJECTIVE AND OBJECTIVE BOX
Pt seen and examined f/u diarrhea and elevated LFTs with septic shock  This morning she continues on vent and pressors. The transaminases continue to rise with recent cat scan just showing hepatomegaly. The lactate is 13. She got 3 units PRBC yesterday but signs of GI bleed with no blood in NG and no further BMs. Still febrile.    REVIEW OF SYSTEMS: not obtainable        MEDICATIONS:  MEDICATIONS  (STANDING):  ascorbic acid IVPB 1500 milliGRAM(s) IV Intermittent every 6 hours  azithromycin  IVPB 500 milliGRAM(s) IV Intermittent every 24 hours  azithromycin  IVPB      cefepime  IVPB 2000 milliGRAM(s) IV Intermittent every 24 hours  chlorhexidine 0.12% Liquid 15 milliLiter(s) Swish and Spit every 12 hours  dexmedetomidine Infusion 0.5 MICROgram(s)/kG/Hr (7.258 mL/Hr) IV Continuous <Continuous>  dextrose 5%. 1000 milliLiter(s) (50 mL/Hr) IV Continuous <Continuous>  dextrose 50% Injectable 12.5 Gram(s) IV Push once  dextrose 50% Injectable 25 Gram(s) IV Push once  dextrose 50% Injectable 25 Gram(s) IV Push once  ethambutol 800 milliGRAM(s) Oral daily  hydrocortisone sodium succinate Injectable 50 milliGRAM(s) IV Push every 6 hours  metroNIDAZOLE  IVPB      metroNIDAZOLE  IVPB 500 milliGRAM(s) IV Intermittent every 8 hours  norepinephrine Infusion 0.02 MICROgram(s)/kG/Min (2.177 mL/Hr) IV Continuous <Continuous>  nystatin Cream 1 Application(s) Topical two times a day  pantoprazole  Injectable 40 milliGRAM(s) IV Push every 12 hours  rifabutin 300 milliGRAM(s) Oral daily  sodium bicarbonate  Infusion 0.388 mEq/kG/Hr (150 mL/Hr) IV Continuous <Continuous>  thiamine IVPB 200 milliGRAM(s) IV Intermittent two times a day  trimethoprim / sulfamethoxazole IVPB 320 milliGRAM(s) IV Intermittent every 8 hours  vancomycin  IVPB 1000 milliGRAM(s) IV Intermittent every 24 hours    MEDICATIONS  (PRN):  dextrose Gel 1 Dose(s) Oral once PRN Blood Glucose LESS THAN 70 milliGRAM(s)/deciliter  dextrose Gel 1 Dose(s) Oral once PRN Blood Glucose LESS THAN 70 milliGRAM(s)/deciliter  glucagon  Injectable 1 milliGRAM(s) IntraMuscular once PRN Glucose LESS THAN 70 milligrams/deciliter      Allergies    No Known Allergies    Intolerances        Vital Signs Last 24 Hrs  T(C): 38.6 (2018 05:00), Max: 39.2 (2018 00:00)  T(F): 101.5 (2018 05:00), Max: 102.6 (2018 00:00)  HR: 114 (2018 06:00) (109 - 128)  BP: 98/53 (2018 10:45) (72/43 - 105/50)  BP(mean): 71 (2018 10:45) (38 - 75)  RR: 42 (2018 06:00) (32 - 46)  SpO2: 95% (2018 06:00) (72% - 100%)     @ 07:01  -   @ 07:00  --------------------------------------------------------  IN: 8665.1 mL / OUT: 170 mL / NET: 8495.1 mL        PHYSICAL EXAM:    General: overweight  female on vent unreasponsive in no acute distress  HEENT: MMM, conjunctiva and sclera clear  Gastrointestinal:Abdomen: Soft with mild distention but no obvious tebnderness and no BS No masses or organomegaly  Extremities: no cyanosis, clubbing or edema.  Skin: Warm and dry. No obvious rash    LABS:  ABG - ( 2018 16:18 )  pH: 7.17  /  pCO2: 57    /  pO2: 72    / HCO3: 18    / Base Excess: -8.4  /  SaO2: 92                  CBC Full  -  ( 2018 05:25 )  WBC Count : 24.8 K/uL  Hemoglobin : 7.5 g/dL  Hematocrit : 23.1 %  Platelet Count - Automated : 292 K/uL  Mean Cell Volume : 83.7 fl  Mean Cell Hemoglobin : 27.2 pg  Mean Cell Hemoglobin Concentration : 32.5 g/dL  Auto Neutrophil # : x  Auto Lymphocyte # : x  Auto Monocyte # : x  Auto Eosinophil # : x  Auto Basophil # : x  Auto Neutrophil % : x  Auto Lymphocyte % : x  Auto Monocyte % : x  Auto Eosinophil % : x  Auto Basophil % : x        136  |  88<L>  |  50.0<H>  ----------------------------<  221<H>  5.2   |  18.0<L>  |  1.46<H>    Ca    7.3<L>      2018 05:25  Phos  6.0       Mg     1.8         TPro  3.6<L>  /  Alb  1.3<L>  /  TBili  3.5<H>  /  DBili  x   /  AST  6807<H>  /  ALT  501<H>  /  AlkPhos  733<H>      PT/INR - ( 2018 16:11 )   PT: 39.2 sec;   INR: 3.47 ratio         PTT - ( 2018 16:11 )  PTT:47.3 sec      Urinalysis Basic - ( 2018 11:33 )    Color: Yellow / Appearance: Clear / S.015 / pH: x  Gluc: x / Ketone: Trace  / Bili: Moderate / Urobili: 1 mg/dL   Blood: x / Protein: 30 mg/dL / Nitrite: Negative   Leuk Esterase: Trace / RBC: 0-2 /HPF / WBC 10-15 /HPF   Sq Epi: x / Non Sq Epi: Moderate / Bacteria: Few                RADIOLOGY & ADDITIONAL STUDIES (The following images were personally reviewed):  < from: CT Abdomen and Pelvis w/ IV Cont (18 @ 17:26) >  EXAM:  CT ABDOMEN AND PELVIS IC                         EXAM:  CT CHEST IC                          *** ADDENDUM 2018  ***    Addendum:    Subsequent to the noncontrast CT scan, a full post IV contrast CT scan of   the chest abdomen and pelvis was performed. 95 cc of Omnipaque 300   administered.    These images demonstrate no additional findings.      *** END OF ADDENDUM 2018  ***      PROCEDURE DATE:  2018          INTERPRETATION:  HISTORY:  Septic shock. .    Date/Time of exam: 2018 4:28 PM    TECHNIQUE:  Sections were obtained from the lung apices to the symphysis   pubis without oral or intravenous contrast.       COMPARISON EXAMINATION:     2018.    FINDINGS:    No evidence of mediastinal or hilar lymphadenopathy. Small right pleural   effusion. No evidence of a left pleural effusion. No evidence of a   pericardial effusion. No evidence of axillary adenopathy.    Status post left hemithyroidectomy.    Bilateral groundglass infiltrates in both upper lobes, right side worse   than left. Less pronounced inflammatory changes noted in the lower lobes   bilaterally.    There is a calcification in the left lobe of the liver, unchanged and   2018. Pattern megaly. Status post cholecystectomy.    The spleen is not enlarged and demonstrates no focal abnormality. The   pancreatic contour is unremarkable without evidence of mass, inflammation   or ductal dilatation. The adrenal glands demonstrate normal size and   contour.    The kidneys reveal a normal unenhanced morphology without evidence of   stone or hydronephrosis.    No evidence of retroperitoneal or pelvic lymphadenopathy.    The bladder is unremarkable. The uterus and adnexal structures   demonstrate no abnormality.    There is a small amount of ascites layering in the pelvis.    No intestinal abnormality is identified on this study performed without   contrast.    Degenerative changes in the spine.    IMPRESSION:     Bilateral pulmonary infiltrates most pronounced in the upper lobes, right   side greater than left.    Small right pleural effusion.    Hepatomegaly.      ***Please see the addendum at the top of this report. It may contain   additional important information or changes.****          LARS HURT M.D., ATTENDING RADIOLOGIST  This document has been electronically signed. 2018  5:39PM  Addend:  LARS HURT M.D., ATTENDING RADIOLOGIST  This addendum was electronically signed on: 2018  7:49PM.            < end of copied text >

## 2018-01-18 NOTE — PROGRESS NOTE ADULT - PROBLEM SELECTOR PLAN 1
due to norovirus and now resolved will probable low flow state to intestines and ileus. due to norovirus and now resolved but also probably has a low vascular flow state to intestineswith resultant ileus.

## 2018-01-18 NOTE — CHART NOTE - NSCHARTNOTEFT_GEN_A_CORE
Met with daughter, and son and one daughter on phone. Daughter Lala part of conversation. We explained to them that unfortunately, we have seen her get worse and it is unlikely that she will have any kind of meaningful recovery. We explained they have two options, we can continue all aggressive treatments and see what happens vs. saying that it is unlikely that she will recover and we shift our focus to comfort measures only, including stopping vasopressors, and removing the machines, and allow her to die with dignity and peace. They are leaning towards comfort but need to speak to all the other siblings.

## 2018-01-18 NOTE — PROGRESS NOTE ADULT - SUBJECTIVE AND OBJECTIVE BOX
67y  Female  No Known Allergies    CC: Patient is a 67y old  Female who presents with a chief complaint of abdominal pain fever and diarrhea (2018 23:24)    HPI:  68 y/o female with h/o DM (diet controlled, HTN (on no medications), pancytopenia, elevated transaminases, was referred to the Er by PCP because of fever, diarrhea and abdominal pain.  The patient reported watery diarrhea and nausea for the 3 weeks and may have had thrush for as many as 3 days before coming to ER as well. She was admitted with fever, diarrhea, oral thrush and testing positive for norovirus in the stool. While admitted to medicine and on the floor she tested positive for HIV this admission with  a CD4 count of 5.  While on the floor she was a rapid response for hypotension, tachycardia and hypoglycemia, was transfered to ICU eventually required intubation for acute hypoxic respiratory failure and septic shock with on IV pressors. She was also treated empericly for PCP pneumonia but progressed over the ICU course from septic shock to multisystem organ failure on multiple IV pressors .  Tonight i conducted a family meeting at their request to discuss their discission to make the patient comfort care and terminal wean. The patient was placed on comfort care, IV pressors were stopped and she was extubated with family at bedside the patient  one to two minutes after extubation.       PAST MEDICAL & SURGICAL HISTORY:  Anemia  Sleep apnea  Myocardial infarction  GERD (gastroesophageal reflux disease)  Dyslipidemia  Hypertension  Diabetes mellitus  H/O right knee surgery:   S/P cataract extraction: B/L  S/P partial thyroidectomy  S/P tubal ligation: s/p cyst removal of left axilla  S/P cholecystectomy    FAMILY HISTORY:  No pertinent family history in first degree relatives      Vitals   Vital Signs Last 24 Hrs  T(C): 40.4 (2018 21:45), Max: 40.4 (2018 21:45)  T(F): 104.7 (2018 21:45), Max: 104.7 (2018 21:45)  HR: 0 (2018 22:15) (0 - 115)  BP: --  BP(mean): --  RR: 0 (2018 22:15) (0 - 45)  SpO2: 74% (2018 22:15) (74% - 100%)  ABG - ( 2018 10:59 )  pH: 7.28  /  pCO2: 43    /  pO2: 85    / HCO3: 19    / Base Excess: -6.0  /  SaO2: 96                I&O's Summary    2018 07:01  -  2018 07:00  --------------------------------------------------------  IN: 8665.1 mL / OUT: 170 mL / NET: 8495.1 mL    2018 07:01  -  2018 02:28  --------------------------------------------------------  IN: 2733.3 mL / OUT: 130 mL / NET: 2603.3 mL        LABS      136  |  88<L>  |  50.0<H>  ----------------------------<  221<H>  5.2   |  18.0<L>  |  1.46<H>    Ca    7.3<L>      2018 05:25  Phos  6.0       Mg     1.8         TPro  3.6<L>  /  Alb  1.3<L>  /  TBili  3.5<H>  /  DBili  x   /  AST  6807<H>  /  ALT  501<H>  /  AlkPhos  733<H>                            7.5    24.8  )-----------( 292      ( 2018 05:25 )             23.1     PT/INR - ( 2018 16:11 )   PT: 39.2 sec;   INR: 3.47 ratio         PTT - ( 2018 16:11 )  PTT:47.3 sec      LIVER FUNCTIONS - ( 2018 05:25 )  Alb: 1.3 g/dL / Pro: 3.6 g/dL / ALK PHOS: 733 U/L / ALT: 501 U/L / AST: 6807 U/L / GGT: x           CAPILLARY BLOOD GLUCOSE      POCT Blood Glucose.: 84 mg/dL (2018 06:37)        VENT SETTINGS   Mode: AC/ CMV (Assist Control/ Continuous Mandatory Ventilation)  RR (machine): 35  TV (machine): 360  FiO2: 70  PEEP: 10  MAP: 16  PIP: 23      Meds  MEDICATIONS  (STANDING):  ascorbic acid IVPB 1500 milliGRAM(s) IV Intermittent every 6 hours  azithromycin  IVPB 500 milliGRAM(s) IV Intermittent every 24 hours  azithromycin  IVPB      cefepime  IVPB 2000 milliGRAM(s) IV Intermittent every 24 hours  chlorhexidine 0.12% Liquid 15 milliLiter(s) Swish and Spit every 12 hours  dexmedetomidine Infusion 0.5 MICROgram(s)/kG/Hr (7.258 mL/Hr) IV Continuous <Continuous>  dextrose 5%. 1000 milliLiter(s) (50 mL/Hr) IV Continuous <Continuous>  dextrose 50% Injectable 12.5 Gram(s) IV Push once  dextrose 50% Injectable 25 Gram(s) IV Push once  dextrose 50% Injectable 25 Gram(s) IV Push once  ethambutol 800 milliGRAM(s) Oral daily  hydrocortisone sodium succinate Injectable 50 milliGRAM(s) IV Push every 6 hours  HYDROmorphone  Injectable 0.5 milliGRAM(s) IV Push every 6 hours  metroNIDAZOLE  IVPB 500 milliGRAM(s) IV Intermittent every 12 hours  morphine  Infusion 5 mG/Hr (5 mL/Hr) IV Continuous <Continuous>  norepinephrine Infusion 0.02 MICROgram(s)/kG/Min (2.177 mL/Hr) IV Continuous <Continuous>  nystatin Cream 1 Application(s) Topical two times a day  pantoprazole  Injectable 40 milliGRAM(s) IV Push every 12 hours  rifabutin 300 milliGRAM(s) Oral daily  sodium bicarbonate  Infusion 0.194 mEq/kG/Hr (75 mL/Hr) IV Continuous <Continuous>  thiamine IVPB 200 milliGRAM(s) IV Intermittent two times a day  trimethoprim / sulfamethoxazole IVPB 160 milliGRAM(s) IV Intermittent every 8 hours  vancomycin  IVPB 1000 milliGRAM(s) IV Intermittent every 24 hours  vasopressin Infusion 0.04 Unit(s)/Min (2.4 mL/Hr) IV Continuous <Continuous>      REVIEW OF SYSTEMS:    unable to obatin due to mental status     Physicial Exam:       HEENT: PERRLA, EOMI, no drainage or redness  Neck: No bruits; no thyromegaly or nodules,  No JVD  Back: Normal spine flexure, No CVA tenderness, No deformity or limitation of movement  Respiratory: Breath Sounds equal & clear to percussion & auscultation, no accessory muscle use  Cardiovascular: Regular rate & rhythm, normal S1, S2; no murmurs, gallops or rubs; no S3, S4  Gastrointestinal: Soft, non-tender, non distended no hepatosplenomegaly, normal bowel sounds  Extremities: No peripheral edema, No cyanosis, clubbing   Vascular: Equal and normal pulses: 2+ peripheral pulses throughout  Neurological: unresponsive to verbal and painful stimuli pupils are nonreactive to light and accomodation   Musculoskeletal: No joint pain, swelling or deformity; no limitation of movement  Skin: No rashes

## 2018-01-18 NOTE — DISCHARGE NOTE FOR THE EXPIRED PATIENT - NS PRELIMINARY CAUSE OF DEATH_RESTRICTED
Hepatic Failure/Multi-organ Dysfunction Syndrome/Renal Failure/Cardiopulmonary Arrest/Respiratory Failure/Sepsis

## 2018-01-18 NOTE — PROGRESS NOTE ADULT - PROBLEM SELECTOR PROBLEM 3
Abnormal CAT scan
Abnormal CAT scan
Diarrhea, unspecified type
HIV antibody positive
R/O Echinococcosis
Elevated LFTs
Elevated transaminase level
HIV antibody positive
HIV antibody positive
R/O Echinococcosis
R/O JOSE (mycobacterium avium-intracellulare)
Renal failure
AIDS
R/O JOSE (mycobacterium avium-intracellulare)
R/O Echinococcosis
R/O Echinococcosis
Elevated transaminase level
Elevated LFTs
Elevated transaminase level

## 2018-01-18 NOTE — PROGRESS NOTE ADULT - PROBLEM SELECTOR PLAN 3
acutely worsening over the past 24-48 hours, likely secondary to hypoperfusion of liver which is likely secondary to septic shock. will attempt to correct underlying etiology and monitor levels
worsening with pancytopenia, bilirubin and coagulopathy.  d/w EDILIA Hepatology fellow. She has not returned back with reply after d/w her attending.
Echinococcus serology negative  Calcified cysts on CT which goes with prior infection and dead cysts
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
Echinococcus serology negative  Calcified cysts on CT which goes with prior infection and dead cysts
Now febrile and tachycardic and hypotensive, r/o sepsis. Management as per ID. Would recommend pan-culturing pt. if not already done and repeating CXR if not already done. Further management of this problem as per Medicine and ID.
as above-rule out ecchinoccal cysts-as per ID
due to norovirus and improving. Agree with current therapy.
AFB blood cultures pending  unlikely JOSE  Will treat given her acute illness  Continue Azithromycin, Rifabutin, Ethambutol
Echinococcus serology negative  Calcified cysts on CT which goes with prior infection and dead cysts
Echinococcus serology negative  Calcified cysts on CT which goes with prior infection and dead cysts
Echinococcus serology pending
Echinococcus serology pending  Calcified cysts on CT which goes with prior infection and dead cysts
Likely due to problem 1  Monitor LFTs  GI consult
Likely due to problem 1  Monitor LFTs  GI consult
Likely due to problem 1 or fatty liver  Monitor LFTs  GI consult  Lifestyle management changes counselled and the risks of cirrhosis with fatty lvier explained
New diagnosis  Awaiting further ID input
levels increasing and GI w/u in progress  Likely due to problem 1 or fatty liver  increasing LFTs  GI consult  Lifestyle management changes counselled and the risks of cirrhosis with fatty liver explained
new diagnosis  Discussed at length with patient regarding new diagnosis. she denies any sexual partner for over 20 years since her 's death.   Viral load pending  ID consult appreciated  YURIY notified
shock liver
- grave prognosis.
AFB blood cultures pending  unlikely JOSE  Will treat given her acute illness  Continue Azithromycin, Rifabutin, Ethambutol
Echinococcus serology negative  Calcified cysts on CT which goes with prior infection and dead cysts
levels increasing and GI w/u in progress  Likely due to problem 1 or fatty liver  Monitor LFTs  GI consult  Lifestyle management changes counselled and the risks of cirrhosis with fatty liver explained

## 2018-01-19 DIAGNOSIS — N19 UNSPECIFIED KIDNEY FAILURE: ICD-10-CM

## 2018-01-19 LAB
B19V DNA FLD QL NAA+PROBE: SIGNIFICANT CHANGE UP
CULTURE RESULTS: SIGNIFICANT CHANGE UP
SPECIMEN SOURCE: SIGNIFICANT CHANGE UP

## 2018-01-20 LAB
CULTURE RESULTS: SIGNIFICANT CHANGE UP
SPECIMEN SOURCE: SIGNIFICANT CHANGE UP

## 2018-01-21 LAB
CULTURE RESULTS: SIGNIFICANT CHANGE UP
SPECIMEN SOURCE: SIGNIFICANT CHANGE UP

## 2018-01-22 LAB
CULTURE RESULTS: SIGNIFICANT CHANGE UP
CULTURE RESULTS: SIGNIFICANT CHANGE UP
ORGANISM # SPEC MICROSCOPIC CNT: SIGNIFICANT CHANGE UP
ORGANISM # SPEC MICROSCOPIC CNT: SIGNIFICANT CHANGE UP
SPECIMEN SOURCE: SIGNIFICANT CHANGE UP
SPECIMEN SOURCE: SIGNIFICANT CHANGE UP

## 2018-01-23 LAB — E HISTOLYT AB SER-ACNC: NEGATIVE — SIGNIFICANT CHANGE UP

## 2018-01-25 LAB — NON-GYN CYTOLOGY SPEC: SIGNIFICANT CHANGE UP

## 2018-01-26 LAB
CULTURE RESULTS: SIGNIFICANT CHANGE UP
SPECIMEN SOURCE: SIGNIFICANT CHANGE UP

## 2018-02-06 NOTE — ED ADULT TRIAGE NOTE - WEIGHT IN LBS
For information on Fall & Injury Prevention, visit [email or web address]
139.9

## 2018-03-03 LAB
CULTURE RESULTS: SIGNIFICANT CHANGE UP
SPECIMEN SOURCE: SIGNIFICANT CHANGE UP

## 2018-03-10 LAB
CULTURE RESULTS: SIGNIFICANT CHANGE UP
SPECIMEN SOURCE: SIGNIFICANT CHANGE UP

## 2018-06-14 NOTE — PROGRESS NOTE ADULT - SUBJECTIVE AND OBJECTIVE BOX
[address] [address]     DISCHARGE SUMMARY     PATIENT: Lulu Lacey     MRN: [id number]   ADMIT DATE: 2018   BILLIN   DISCHARGE DATE:  18     ATTENDING: Derek Lopez MD   DICTATING: Janet Sweeney PA-C     ADMISSION DIAGNOSIS: RIGHT KNEE OSTEOARTHRITIS  Status post right partial knee replacement    DISCHARGE DIAGNOSIS: Status post RIGHT KNEE OSTEOARTHRITIS    HISTORY OF PRESENT ILLNESS: The patient is a 79y.o. year-old male   with ongoing right knee pain secondary to osteoarthritis of his right knee. The patient's pain has persisted and progressed despite conservative treatments and therapies. The patient has at this time opted for surgical intervention. PAST MEDICAL HISTORY:   Past Medical History:   Diagnosis Date    Arthritis     Chronic pain     both knees     Hypertension        PAST SURGICAL HISTORY:   Past Surgical History:   Procedure Laterality Date    HX APPENDECTOMY      HX CATARACT REMOVAL      bilateral     HX COLONOSCOPY      HX OTHER SURGICAL      dental surgery       ALLERGIES: No Known Allergies     CURRENT MEDICATIONS:  A list of medications prior to the time of admission include:  Prior to Admission medications    Medication Sig Start Date End Date Taking? Authorizing Provider   oxyCODONE-acetaminophen (PERCOCET) 5-325 mg per tablet 1-2 tabs by mouth every 4-6 hours as needed for pain 18  Yes Janet Sweeney PA-C   docusate sodium (COLACE) 50 mg capsule Take 2 Caps by mouth three (3) times daily as needed for Constipation for up to 90 days. 18 Yes Janet Sweeney PA-C   aspirin 81 mg chewable tablet Take 1 Tab by mouth two (2) times a day. 18  Yes Janet Sweeney PA-C   amLODIPine-Valsartan-HCTZ 5-160-25 mg tab Take  by mouth daily. Yes Historical Provider   testosterone (TESTIM) 50 mg/5 gram (1 %) gel by TransDERmal route daily.    Yes Historical Provider   simvastatin (ZOCOR) 20 mg tablet Take 20 mg by mouth nightly. Yes Historical Provider   traMADol (ULTRAM) 50 mg tablet Take 50 mg by mouth every six (6) hours as needed for Pain. Yes Historical Provider   tamsulosin (FLOMAX) 0.4 mg capsule Take 0.4 mg by mouth daily. Historical Provider   naproxen sodium (ALEVE) 220 mg tablet Take 220 mg by mouth as needed. Historical Provider       FAMILY HISTORY: History reviewed. No pertinent family history. SOCIAL HISTORY:   Social History     Social History    Marital status:      Spouse name: N/A    Number of children: N/A    Years of education: N/A     Social History Main Topics    Smoking status: Former Smoker    Smokeless tobacco: Never Used    Alcohol use No    Drug use: No    Sexual activity: Not Asked     Other Topics Concern    None     Social History Narrative       REVIEW OF SYSTEMS: All review of systems are negative. PHYSICAL EXAMINATION: For a detailed physical exam, please refer to the patient's chart. HOSPITAL COURSE: The patient was taken to surgery the day of admission. he underwent a right partial knee replacement. Operative course was benign. Estimated blood loss was approximately 50 cc. The patient was taken to the PACU in stable condition and was later taken to the floor in stable condition. During his hospital stay, the patient progressed well with physical therapy and occupational therapy, adherent to instructions. he had been cleared by physical therapy with stair training. he was placed on Aspirin for DVT prophylaxis. his pain has been well controlled with oral pain medications. his vitals have remained stable. he has also remained hemodynamically stable. The patient has been recommended for discharge home on POD#1. DISCHARGE INSTRUCTIONS: The patient is to be discharged home.  he is to continue on his prior medications per the medication reconciliation form, to which we will Progress Note Adult-Heme/Onc Attending Charted Location: [facility] [id number]        FU of anemia:	  	  66 y/o female with h/o DM on oral hypoglycemics was found unresponsive. Accucheck: was 22 mg/dl. repeat accucheck in the ER after treatment was 57.   Pt. was seen by Dr. Orellana in 2015 for iron def anemia but never followed up.  Pt in November 2-17 apparently had normocytic anemia with otherwise normal CBC  on this admission she had pancytopenia which has been stable.  She says she recently started Prandin but says she has been off all hypoglycemics for last month.      Has pancytopenia of unclear etiology.    Abdominal sonogram  suggests hepatomegaly and I am able to feel the liver edge as well which is slightly tender.  No splenomegaly.        Allergies and Intolerances:        Allergies:  	No Known Allergies:     Home Medications:   * Incomplete Medication History as of 29-Dec-2017 04:03 documented in Structured Notes  · 	omeprazole 20 mg oral delayed release tablet: 1 tab(s) orally once a day, Last Dose Taken:    · 	metFORMIN 500 mg oral tablet, extended release: 1 tab(s) orally once a day  	HOLD FOR 2 days, Last Dose Taken:    · 	Aspir 81 oral delayed release tablet: 1 tab(s) orally once a day, Last Dose Taken:    · 	repaglinide 2 mg oral tablet: 1 tab(s) orally 3 times a day (before meals), Last Dose Taken:    · 	losartan-hydrochlorothiazide 100mg-12.5mg oral tablet: 1 tab(s) orally once a day, Last Dose Taken:    · 	traMADol 50 mg oral tablet: orally 2 times a day, Last Dose Taken:    · 	Victoza 18 mg/3 mL subcutaneous solution: Last Dose Taken:      MEDICATIONS  (STANDING):  ascorbic acid 500 milliGRAM(s) Oral daily  aspirin enteric coated 81 milliGRAM(s) Oral daily  dextrose 5%. 1000 milliLiter(s) (50 mL/Hr) IV Continuous <Continuous>  dextrose 50% Injectable 12.5 Gram(s) IV Push once  dextrose 50% Injectable 25 Gram(s) IV Push once  dextrose 50% Injectable 25 Gram(s) IV Push once  enoxaparin Injectable 40 milliGRAM(s) SubCutaneous daily  ertapenem  IVPB      ertapenem  IVPB 1000 milliGRAM(s) IV Intermittent every 24 hours  ferrous    sulfate 325 milliGRAM(s) Oral daily  influenza   Vaccine 0.5 milliLiter(s) IntraMuscular once  insulin lispro (HumaLOG) corrective regimen sliding scale   SubCutaneous three times a day before meals  losartan 100 milliGRAM(s) Oral daily  magnesium oxide 400 milliGRAM(s) Oral three times a day with meals  montelukast 10 milliGRAM(s) Oral daily  pantoprazole    Tablet 40 milliGRAM(s) Oral before breakfast     Past Medical History:  Anemia    Diabetes mellitus    Dyslipidemia    GERD (gastroesophageal reflux disease)    Hypertension    Myocardial infarction    Sleep apnea.     Past Surgical History:  H/O right knee surgery  8/20  S/P cataract extraction  B/L  S/P cholecystectomy    S/P partial thyroidectomy    S/P tubal ligation  s/p cyst removal of left axilla.     Family History:  No pertinent family history in first degree relatives.     Tobacco Screening:  · Core Measure Site	No	  · Has the patient used tobacco in the past 30 days?	No	    Risk Assessment:    Present on Admission:  Deep Venous Thrombosis	no	  Pulmonary Embolus	no	  Urinary Catheter	no	  Central Venous Catheter/PICC Line	no	  Surgical Site Incision	no	  Pressure Ulcer(s)	no	     Heart Failure:  Does this patient have a history of or has been diagnosed with heart failure? no.    Hepatitis C Screen (per [facility] Department of Health, hepatitis C screening must be offered to every individual born between 1945 and 1965)	Unable to offer due to clinical condition	      Vital Signs Last 24 Hrs  T(C): 37.7 (03 Jan 2018 09:21), Max: 39.4 (03 Jan 2018 00:10)  T(F): 99.9 (03 Jan 2018 09:21), Max: 102.9 (03 Jan 2018 00:10)  HR: 93 (03 Jan 2018 09:21) (85 - 98)  BP: 143/85 (03 Jan 2018 09:21) (115/69 - 143/85)  BP(mean): --  RR: 18 (03 Jan 2018 09:21) (18 - 19)  SpO2: 94% (03 Jan 2018 09:21) (94% - 97%)    PHYSICAL EXAM:    · Constitutional	NAD	  No icterus  Positive pallor.  No cervical or axillary lymphadenopathy.  Possible hepatomegaly which is also slightly tender.  No splenomegaly or obvious ascites.  No edema.    Assessment/Plan:    66 y/o female with h/o DM on oral hypoglycemics was found unresponsive. Accu check: was 22 mg/dl. repeat accu check in the Er after treatment was 57.   pt was seen by Dr. Orellana in 2015 for ? iron def anemia but never followed up.  Pt in November had normocytic anemia with otherwise normal CBC  On this admission she has  pancytopenia which has been stable.  She says she recently started Prandin but says she has been off all hypoglycemics for last month.      ALSO HAS HEPATOMEGALY, IS A NON-DRINKER AND NEVER HAD HEPATITIS OR JAUNDICE.  WILL REPEAT LFTs, check AFP.  Consider MRI liver.  If the answer remains elusive, will need a bone marrow.    Thank you. add:  1. Ecotrin 81 mg; 1 tablet p.o. Twice daily X 6 weeks  2. Colace 100 mg po TID prn constipation  3. Percocet 5/325 mg; 1-2 tablets p.o. every 4 to 6 hours p.r.n. for pain    The patient is to continue at home with home physical therapy 3 times a week to work on gait training, range of motion, strengthening, and weightbearing exercises as tolerated on his right lower extremity. The patient is to progress from a walker to a cane to complete total weightbearing as tolerable. The patient is to continue to keep his incision dry. The patient is to followup with Dr. Cheryl Gonzalez in the office approximately 1-2 weeks status post for x-rays and further evaluation.     Kamryn Alegre PA-C  7/70/36216:36 AM

## 2018-07-17 NOTE — PROGRESS NOTE ADULT - PROBLEM SELECTOR PROBLEM 8
TRANSFER - OUT REPORT:    Verbal report given to Reinaldo Liu on Corinne Obrien  being transferred to Oncology (unit) for routine progression of care       Report consisted of patients Situation, Background, Assessment and   Recommendations(SBAR). Information from the following report(s) SBAR, ED Summary and Recent Results was reviewed with the receiving nurse. Lines:   Peripheral IV 07/17/18 Right Forearm (Active)   Site Assessment Clean, dry, & intact 7/17/2018 11:29 AM   Phlebitis Assessment 0 7/17/2018 11:29 AM   Infiltration Assessment 0 7/17/2018 11:29 AM   Dressing Status Clean, dry, & intact 7/17/2018 11:29 AM   Dressing Type Transparent 7/17/2018 11:29 AM   Hub Color/Line Status Pink;Flushed 7/17/2018 11:29 AM   Alcohol Cap Used No 7/17/2018 11:29 AM       Peripheral IV 07/17/18 Right Antecubital (Active)   Site Assessment Clean, dry, & intact 7/17/2018 12:12 PM   Phlebitis Assessment 0 7/17/2018 12:12 PM   Infiltration Assessment 0 7/17/2018 12:12 PM   Dressing Status Clean, dry, & intact 7/17/2018 12:12 PM   Dressing Type Transparent 7/17/2018 12:12 PM   Hub Color/Line Status Pink;Flushed 7/17/2018 12:12 PM   Action Taken Blood drawn 7/17/2018 12:12 PM        Opportunity for questions and clarification was provided. Anemia, unspecified type

## 2018-09-06 NOTE — ED ADULT NURSE NOTE - ALCOHOL PRE SCREEN (AUDIT - C)
Notified Romana (Eve's daughter) of Eve's pap results.    Eve's pap smear result was ASC-US, HPV negative.  There were endometrial cells present, likely related to her post-menopausal bleeding and presence of a polyp (ultrasound showed thin endometrial lining).  According to ASCCP guidelines, patient to have repeat co-testing in 3 yrs.     Reviewed results of ultrasound with Romana, again. Discussed options for SIS vs Hysteroscopy, again.  Romana states that she and Eve had talked about these options and Eve desires to have a SIS. She has been scheduled for this procedure on 9/12.     Romana expressed understanding and agreement with the plan for care.    Lisa Ruelas, DNP, APRN, WHNP        
Statement Selected

## 2018-09-12 NOTE — PATIENT PROFILE ADULT. - NS PRO TALK SOMEONE YN
pulses  Chest: no chest wall tenderness  Abdomen: Soft. Non tender. Non distended. +BS. No rebound, guarding, or rigidity. No pulsatile masses appreciated. Musculoskeletal: Moves all extremities x 4. Warm and well perfused, no clubbing, cyanosis, or edema. Capillary refill <3 seconds  Skin: warm and dry. No rashes. Neurologic: GCS 15, CN 2-12 grossly intact, no focal deficits, symmetric strength 5/5 in the upper and lower extremities bilaterally  Psych: Normal Affect    Wells' Criteria for PE (possible score 0 to 12.5)       Clinical Signs & Symptoms of DVT   No      PE is #1 Dx or Equally Likely   No      Heart Rate >100   No      Immobillization at least 3 days or     Surgery in past 4 Weeks   No      Previous Diagnosed PE or DVT   No      Hemoptysis   No      Malignancy w/Tx in Past 6 Months or     Palliative Tx   No      TOTAL SCORE   0     Low Risk Group: 0-1.5 =  1.3-3 % incidence of PE  Mod Risk Group: 2-3.5 =  16.2 % Incidence of PE  Mod Risk Group: > 4 = 28% Incidence of PE  High Risk Group >5 = 40.6 Incidence of        -------------------------------------------------- RESULTS -------------------------------------------------  I have personally reviewed all laboratory and imaging results for this patient. Results are listed below.      LABS:  Results for orders placed or performed during the hospital encounter of 09/12/18   Troponin   Result Value Ref Range    Troponin <0.01 0.00 - 0.03 ng/mL   CBC Auto Differential   Result Value Ref Range    WBC 16.3 (H) 4.5 - 11.5 E9/L    RBC 4.73 3.50 - 5.50 E12/L    Hemoglobin 14.3 11.5 - 15.5 g/dL    Hematocrit 43.9 34.0 - 48.0 %    MCV 92.8 80.0 - 99.9 fL    MCH 30.2 26.0 - 35.0 pg    MCHC 32.6 32.0 - 34.5 %    RDW 12.0 11.5 - 15.0 fL    Platelets 000 (H) 664 - 450 E9/L    MPV 8.6 7.0 - 12.0 fL    Neutrophils % 75.3 43.0 - 80.0 %    Immature Granulocytes % 0.8 0.0 - 5.0 %    Lymphocytes % 13.7 (L) 20.0 - 42.0 %    Monocytes % 8.7 2.0 - 12.0 %    Eosinophils % RADIOLOGY:  Interpreted by Radiologist.  XR CHEST STANDARD (2 VW)   Final Result   No acute cardiopulmonary disease. EKG Interpretation  Interpreted by emergency department physician    Rhythm: normal sinus   Rate: normal  Axis: normal  Conduction: normal  ST Segments: no acute change  T Waves: no acute change    Clinical Impression: no acute changes  Comparison to prior EKG: no previous EKG      ------------------------- NURSING NOTES AND VITALS REVIEWED ---------------------------   The nursing notes within the ED encounter and vital signs as below have been reviewed by myself. BP (!) 115/54   Pulse 120   Temp 98.9 °F (37.2 °C) (Oral)   Resp 16   Ht 5' 4\" (1.626 m)   Wt 115 lb (52.2 kg)   LMP 08/24/2018   SpO2 98%   BMI 19.74 kg/m²   Oxygen Saturation Interpretation: Normal    The patients available past medical records and past encounters were reviewed. ------------------------------ ED COURSE/MEDICAL DECISION MAKING----------------------  Medications   acetaminophen (TYLENOL) 500 MG tablet (not administered)   0.9 % sodium chloride bolus (0 mLs Intravenous Stopped 9/12/18 1109)   ipratropium-albuterol (DUONEB) nebulizer solution 1 ampule (1 ampule Inhalation Given 9/12/18 1011)   methylPREDNISolone sodium (SOLU-MEDROL) injection 125 mg (125 mg Intravenous Given 9/12/18 1027)             Medical Decision Making:    Time: 9472  Re-evaluation. Patients symptoms are improving  Repeat physical examination is significantly improved, States she felt much improved after the DuoNeb treatment was given. Informed abnormal lab work, chest x-ray and EKG. Counseled on smoking cessation. Advised to follow up with primary care physician. Re-Evaluations:             Re-evaluation. Patients symptoms are improving      Consultations:                 Critical Care:          This patient's ED course included: a personal history and physicial examination, re-evaluation prior to no

## 2020-02-12 NOTE — PATIENT PROFILE ADULT. - EXTENSIONS OF SELF_ADULT
Eyeglasses/not here O-Z Flap Text: The defect edges were debeveled with a #15 scalpel blade.  Given the location of the defect, shape of the defect and the proximity to free margins an O-Z flap was deemed most appropriate.  Using a sterile surgical marker, an appropriate transposition flap was drawn incorporating the defect and placing the expected incisions within the relaxed skin tension lines where possible. The area thus outlined was incised deep to adipose tissue with a #15 scalpel blade.  The skin margins were undermined to an appropriate distance in all directions utilizing iris scissors.

## 2021-06-13 NOTE — ED ADULT NURSE NOTE - NS ED NOTE  TALK SOMEONE YN
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  She has moderate inactive Graves' disease.      Component      Latest Ref Rng & Units 6/14/2017 9/25/2017 9/25/2017            4:50 PM  4:50 PM   PTH      10 - 86 pg/mL   67   Calcium      8.5 - 10.5 mg/dL  9.9 9.9   Phosphorus      2.5 - 4.5 mg/dL   3.3   Creatinine      0.60 - 1.10 mg/dL  0.97 0.96   GFR MDRD Af Amer      >60 mL/min/1.73m2  >60 >60   GFR MDRD Non Af Amer      >60 mL/min/1.73m2  59 (L) 60 (L)   Cholesterol      <=199 mg/dL 220 (H)     Triglycerides      <=149 mg/dL 180 (H)     HDL Cholesterol      >=50 mg/dL 57     LDL Calculated      <=129 mg/dL 127     Patient Fasting > 8hrs?       Unknown     Free T4      0.7 - 1.8 ng/dL  0.6 (L)    Vitamin D, Total (25-Hydroxy)      30.0 - 80.0 ng/mL  35.0    TSH      0.30 - 5.00 uIU/mL  3.70    Potassium      3.5 - 5.0 mmol/L  4.3    T3, Total      45 - 175 ng/dL  128    Hemoglobin A1c      3.5 - 6.0 %  5.3    Cortisol      ug/dL  4.9          Review of Systems:    Nervous System: No headache, dizziness, fainting or memory loss. No tingling sensation of hand or feet.  Ears: No hearing loss or ringing in the ears  Eyes: No blurring of vision, redness, itching or dryness.  Nose: No nosebleed or loss of smell  Mouth: No mouth sores or loss of taste  Throat: No hoarseness or difficulty swallowing  Neck: No enlarged thyroid or lymph nodes.  Heart: No chest pain, palpitation or irregular heartbeat. No swelling of hands or feet  Lungs: No shortness of breath, cough, night sweats, wheezing or hemoptysis.  Gastrointestinal: No nausea or vomiting, constipation or diarrhea.  No acid reflux, abdominal pain or blood in stools.  Kidney/Bladdr: No polyuria, polydipsia, nocturia or hematuria.  Genital/Sexual: No loss of libido  Skin: No rash, hair loss or  "hirsutism.  No abnormal striae  Muscles/Joints/Bones: No morning stiffness, muscle aches and pain or loss of height.    Current Medications:  Current Outpatient Prescriptions   Medication Sig     lisinopril (PRINIVIL,ZESTRIL) 20 MG tablet Take 20 mg by mouth daily.     polyvinyl alcohol (LIQUIFILM TEARS) 1.4 % ophthalmic solution Administer 1 drop to both eyes as needed for dry eyes. \"Blink\"     thyroid, pork, (ARMOUR THYROID) 15 mg Tab Take 1 tablet daily with 60 mg (75 mg total)     thyroid, pork, (ARMOUR THYROID) 60 mg Tab Take 1 tablet daily with 15 mg (75 mg daily)       Patients Active Problems:  Patient Active Problem List   Diagnosis     Appendiceal mucinous cystadenoma     HTN (hypertension)     Nausea     Hypothyroid     GERD (gastroesophageal reflux disease)     Acute blood loss anemia     Postmenopausal bleeding     Chest pain       History:   reports that she has never smoked. She does not have any smokeless tobacco history on file. She reports that she drinks alcohol. She reports that she does not use illicit drugs.   reports that she has never smoked. She does not have any smokeless tobacco history on file. She reports that she drinks alcohol. She reports that she does not use illicit drugs.  History   Smoking Status     Never Smoker   Smokeless Tobacco     Not on file      reports that she has never smoked. She does not have any smokeless tobacco history on file. She reports that she drinks alcohol. She reports that she does not use illicit drugs.  History   Sexual Activity     Sexual activity: Yes     Past Medical History:   Diagnosis Date     Chronic kidney disease     right smaller kidney and renal cysts     GERD (gastroesophageal reflux disease)      Hypertension      Hyperthyroidism     Graves, treated with radiation     PONV (postoperative nausea and vomiting)      Renal cyst     h/o     Family History   Problem Relation Age of Onset     Endometrial cancer Mother      Cancer Father      " "prostate     Breast cancer Paternal Grandmother      BRCA 1/2 Neg Hx      Colon cancer Neg Hx      Ovarian cancer Neg Hx      Past Medical History:   Diagnosis Date     Chronic kidney disease     right smaller kidney and renal cysts     GERD (gastroesophageal reflux disease)      Hypertension      Hyperthyroidism     Graves, treated with radiation     PONV (postoperative nausea and vomiting)      Renal cyst     h/o     Past Surgical History:   Procedure Laterality Date     APPENDECTOMY  14      SECTION       CHOLECYSTECTOMY       CYSTOSCOPY N/A 2016    Procedure: CYSTOSCOPY;  Surgeon: Jonah Freeman MD;  [place] [facility] [place]:      DILATION AND CURETTAGE OF UTERUS  2014     HYSTERECTOMY       KIDNEY SURGERY Right 1965    in childhood for congenital problem     knee arthroscopies Bilateral      LAPAROSCOPIC COLON RESECTION  14     laparoscopic ileocecectomy       OOPHORECTOMY       CA LAP,SURG,COLECTOMY, PARTIAL, W/ANAST N/A 2014    Procedure: LAPAROSCOPIC ILEOCECECTOMY;  Surgeon: Sara Duran MD;  [place] [facility] [place]  Service: General       Vitals   height is 5' 5\" (1.651 m) and weight is 222 lb (100.7 kg). Her blood pressure is 110/68.         Exam  General appearance: The patient looked well, not in acute distress.  Eyes: no evidence of thyroid eye disease.   Retinal exam: No evidence of diabetic retinopathy.  Mouth and Throat: Normal  Neck: No evidence of thyromegaly, enlarged lymph node or tenderness  Chest: Trachea is central. Chest is clear to auscultation and percussion. Breat sounds are normal.  Cardiovascular exam: JVP is not raised. Heart sounds are normal, no murmurs or rub  Peripheral pulses are palpable.   Abdomen: No masses or tenderness.    Back: No vertebral tenderness or kyphosis.  Extremities: No evidence of leg edema.   Skin: Normal to touch.  No abnormal striae  Neurologic exam:  Visual fields are intact by " confrontation, grossly intact. No evidence of peripheral neuropathy.  Detailed foot exam normal.        Diagnosis:  No diagnosis found.    Orders:   No orders of the defined types were placed in this encounter.        Assessment and Plan: Post ablative hypothyroidism she is currently on [place] Thyroid 75 mg daily she feels that works better for her.  Her TSH is 3.7 free T4 0.6 total T3 128.    I will add Synthroid 25 mcg daily.    She has tiredness and body aches vitamin D 35 for did advise her to take 2000 units daily for vitamin D.  Her serum cortisol was normal at 4.9.  We will check her thyroid function test every 3 month her A1c is 5.3 she will return to clinic in 6 months I did spend 40 minutes with the patient more than 50% was spent on counseling and managing her care.     No

## 2022-04-29 NOTE — ED ADULT NURSE NOTE - BREATHING, MLM
Patient Education     R.I.C.E.    R.I.C.E. stands for Rest, Ice, Compression, and Elevation. Doing these things helps limit pain and swelling after an injury. R.I.C.E. also helps injuries heal faster. Use R.I.C.E. for sprains, strains, and severe bruises or bumps. Follow the tips on this handout and begin R.I.C.E. as soon as possible after an injury.  ? Rest  Pain is your body’s way of telling you to rest an injured area. Whether you have hurt an elbow, hand, foot, or knee, limiting its use will prevent further injury and help you heal.  ? Ice  Applying ice right after an injury helps prevent swelling and reduce pain. Don’t place ice directly on your skin.  · Wrap a cold pack or bag of ice in a thin cloth. Place it over the injured area.  · Ice for 10 minutes every 3 hours. Don’t ice for more than 20 minutes at a time.  ? Compression  Putting pressure (compression) on an injury helps prevent swelling and provides support.  · Wrap the injured area firmly with an elastic bandage. If your hand or foot tingles, becomes discolored, or feels cold to the touch, the bandage may be too tight. Rewrap it more loosely.  · If your bandage becomes too loose, rewrap it.  · Do not wear an elastic bandage overnight.  ? Elevation  Keeping an injury elevated helps reduce swelling, pain, and throbbing. Elevation is most effective when the injury is kept elevated higher than the heart.     Call your healthcare provider if you notice any of the following:  · Fingers or toes feel numb, are cold to the touch, or change color  · Skin looks shiny or tight  · Pain, swelling, or bruising worsens and is not improved with elevation   Date Last Reviewed: 9/3/2015  © 7546-8839 [employer]. [address] [place] [address], [place] [address]. All rights reserved. This information is not intended as a substitute for professional medical care. Always follow your healthcare professional's instructions.           Patient Education     Shoulder  Pain with Uncertain Cause   Shoulder pain can have many causes. Pain often comes from the structures that surround the shoulder joint. These are the joint capsule, ligaments, tendons, muscles, and bursa. Pain can also come from cartilage in the joint. Cartilage can become worn out or injured. It’s important to know what’s causing your pain so the healthcare provider can use the correct treatment. But sometimes it’s difficult to find the exact cause of shoulder pain. You may need to see a specialist (orthopedist). You may also need special tests such as a CT scan or MRI. The provider may need to use special tools to look inside the joint (arthroscopy).  Shoulder pain can be treated with a sling or a device that keeps your shoulder from moving. You can take an anti-inflammatory medicine such as ibuprofen to ease pain. You may need to do special shoulder exercises. Follow up with a specialist if the pain is severe or doesn’t go away after a few weeks.  Home care  Follow these tips when caring for yourself at home:  · If a sling was given to you, leave it in place for the time advised by your healthcare provider. If you aren’t sure how long to wear it, ask for advice. If the sling becomes loose, adjust it so that your forearm is level with the ground. Your shoulder should feel well supported.  · Put an ice pack on the injured area for 20 minutes every 1 to 2 hours the first day. You can make your own ice pack by putting ice cubes in a plastic bag. Wrap the bag in a thin towel. Continue with ice packs 3 to 4 times a day for the next 2 days. Then use the pack as needed to ease pain and swelling.  · You may use acetaminophen or ibuprofen to control pain, unless another pain medicine was prescribed. If you have chronic liver or kidney disease, talk with your healthcare provider before using these medicines. Also talk with your provider if you’ve ever had a stomach ulcer or digestive bleeding.  · Shoulder pain may seem worse  at night, when there is less to distract you from the pain. If you sleep on your side, try to keep weight off your painful shoulder. Propping pillows behind you may stop you from rolling over onto that shoulder during sleep.   · Shoulder and elbow joints can become stiff if left in a sling for too long. You should start range of motion exercises about 7 to 10 days after the injury. Talk with your provider to find out what type of exercises to do and how soon to start.  · You can take the sling off to shower or bathe.  Follow-up care  Follow up with your healthcare provider if you don’t start to get better in the next 5 days.  When to seek medical advice  Call your healthcare provider right away if any of these occur:  · Pain or swelling gets worse or continues for more than a few days  · Your hand or fingers become cold, blue, numb, or tingly  · Large amount of bruising on your shoulder or upper arm  · Trouble moving your hand or fingers  · Weakness in your hand or fingers  · Your shoulder becomes stiff  · It feels like your shoulder is popping out  · You are less able to do your daily activities  [employer] last reviewed this educational content on 1/1/2020  © 4349-6744 [employer]. All rights reserved. This information is not intended as a substitute for professional medical care. Always follow your healthcare professional's instructions.            Labored

## 2022-07-26 NOTE — ED ADULT NURSE NOTE - DURATION
7/26/2022    Laura Joya Subramanian  [address] [facility] [address]      Dear Laura,    I am pleased to inform you that your recent cultures: Gonorrhea, Chlamydia and Trichomonas are normal.  If you have any questions or concerns, please call our office at [phone].    Sincerely,     Kassandra Sexton, DO  [address] [facility] [address] [phone]   day(s)

## 2023-01-12 NOTE — ED PROVIDER NOTE - CPE EDP RESP NORM
Patient presented after waiting 30 minutes with no reaction to  injections. Discharged from clinic.        Alicia Obrien RN on 1/12/2023 at 4:21 PM    
- - -

## 2023-02-14 NOTE — ED PROVIDER NOTE - CARDIAC, MLM
[facility] Phone: [phone] ([phone])
Normal rate, regular rhythm.  Heart sounds S1, S2.  No murmurs, rubs or gallops.

## 2023-07-17 NOTE — PATIENT PROFILE ADULT. - TOBACCO USE
[3] : 2) Feeling down, depressed, or hopeless for nearly every day (3) [PHQ-9 Positive] : PHQ-9 Positive [I have developed a follow-up plan documented below in the note.] : I have developed a follow-up plan documented below in the note. [HIV test declined] : HIV test declined [Hepatitis C test declined] : Hepatitis C test declined Never smoker

## 2023-07-26 NOTE — H&P ADULT - NEGATIVE NEUROLOGICAL SYMPTOMS
The patient is a 25y Female complaining of nausea, vomiting, diarrhea. no transient paralysis/no weakness

## 2023-11-22 NOTE — PROGRESS NOTE ADULT - PROBLEM SELECTOR PROBLEM 1
Abnormal CAT scan
Diarrhea of presumed infectious origin
Diarrhea, unspecified type
Elevated transaminase level
Acute respiratory failure with hypoxia
Diarrhea of presumed infectious origin
Diarrhea, unspecified type
R/O Echinococcosis
R/O Echinococcosis
Sepsis, due to unspecified organism
Septic shock
Acute respiratory failure with hypoxia
Sepsis, due to unspecified organism
Diarrhea, unspecified type
Sepsis, due to unspecified organism
Diarrhea of presumed infectious origin
 used
Septic shock
Diarrhea of presumed infectious origin